# Patient Record
Sex: FEMALE | Race: WHITE
[De-identification: names, ages, dates, MRNs, and addresses within clinical notes are randomized per-mention and may not be internally consistent; named-entity substitution may affect disease eponyms.]

---

## 2021-12-02 ENCOUNTER — TRANSCRIPTION ENCOUNTER (OUTPATIENT)
Age: 71
End: 2021-12-02

## 2021-12-03 PROBLEM — Z00.00 ENCOUNTER FOR PREVENTIVE HEALTH EXAMINATION: Status: ACTIVE | Noted: 2021-12-03

## 2021-12-06 ENCOUNTER — APPOINTMENT (OUTPATIENT)
Dept: DISASTER EMERGENCY | Facility: HOSPITAL | Age: 71
End: 2021-12-06

## 2022-03-12 ENCOUNTER — TRANSCRIPTION ENCOUNTER (OUTPATIENT)
Age: 72
End: 2022-03-12

## 2023-03-08 RX ORDER — CEFUROXIME AXETIL 250 MG
1 TABLET ORAL
Qty: 0 | Refills: 0 | DISCHARGE
Start: 2023-03-08 | End: 2023-03-17

## 2023-03-11 ENCOUNTER — EMERGENCY (EMERGENCY)
Facility: HOSPITAL | Age: 73
LOS: 0 days | Discharge: HOME | End: 2023-03-11
Attending: EMERGENCY MEDICINE
Payer: MEDICARE

## 2023-03-11 VITALS
RESPIRATION RATE: 19 BRPM | DIASTOLIC BLOOD PRESSURE: 63 MMHG | SYSTOLIC BLOOD PRESSURE: 124 MMHG | WEIGHT: 115.08 LBS | HEIGHT: 60 IN | OXYGEN SATURATION: 94 % | TEMPERATURE: 98 F | HEART RATE: 91 BPM

## 2023-03-11 DIAGNOSIS — Z79.82 LONG TERM (CURRENT) USE OF ASPIRIN: ICD-10-CM
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
## 2023-03-11 DIAGNOSIS — S30.0XXA CONTUSION OF LOWER BACK AND PELVIS, INITIAL ENCOUNTER: ICD-10-CM

## 2023-03-11 DIAGNOSIS — Z85.830 PERSONAL HISTORY OF MALIGNANT NEOPLASM OF BONE: ICD-10-CM

## 2023-03-11 DIAGNOSIS — D64.9 ANEMIA, UNSPECIFIED: ICD-10-CM
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
## 2023-03-11 DIAGNOSIS — M54.50 LOW BACK PAIN, UNSPECIFIED: ICD-10-CM

## 2023-03-11 PROCEDURE — 72125 CT NECK SPINE W/O DYE: CPT | Mod: 26,MB

## 2023-03-11 PROCEDURE — 70450 CT HEAD/BRAIN W/O DYE: CPT | Mod: MB

## 2023-03-11 PROCEDURE — 72131 CT LUMBAR SPINE W/O DYE: CPT | Mod: MB

## 2023-03-11 PROCEDURE — 70450 CT HEAD/BRAIN W/O DYE: CPT | Mod: 26,MB

## 2023-03-11 PROCEDURE — 72125 CT NECK SPINE W/O DYE: CPT | Mod: MB

## 2023-03-11 PROCEDURE — 99284 EMERGENCY DEPT VISIT MOD MDM: CPT | Mod: 25

## 2023-03-11 PROCEDURE — 99284 EMERGENCY DEPT VISIT MOD MDM: CPT

## 2023-03-11 PROCEDURE — 72131 CT LUMBAR SPINE W/O DYE: CPT | Mod: 26,MB

## 2023-03-11 RX ORDER — ACETAMINOPHEN 500 MG
650 TABLET ORAL ONCE
Refills: 0 | Status: COMPLETED | OUTPATIENT
Start: 2023-03-11 | End: 2023-03-11

## 2023-03-11 RX ADMIN — Medication 650 MILLIGRAM(S): at 18:01

## 2023-03-11 RX ADMIN — Medication 650 MILLIGRAM(S): at 18:34

## 2023-03-11 RX ADMIN — Medication 30 MILLILITER(S): at 18:52

## 2023-03-11 NOTE — ED PROVIDER NOTE - PATIENT PORTAL LINK FT
You can access the FollowMyHealth Patient Portal offered by [facility] by registering at the following website: [email or web address]. By joining [employer]’s FollowMyHealth portal, you will also be able to view your health information using other applications (apps) compatible with our system.

## 2023-03-11 NOTE — ED ADULT TRIAGE NOTE - CHIEF COMPLAINT QUOTE
Pt fell at home outside from standing level. Pt states when she fell she landed on her buttocks then hit the back of her head. Pt lives with  and wear 02 2 L at home. Pt states she is having back pain and has a bump to the back of her head. Takes baby aspirin at home. Dr. Gonzalez notified. Neuro alert called and Pt brought to CT scan.

## 2023-03-11 NOTE — ED PROVIDER NOTE - NSFOLLOWUPINSTRUCTIONS_ED_ALL_ED_FT
Contusion head and back    Follow with your PMD asap regarding CT results as discussed    Return for any new or worsening symptoms    Tylenol or similar for pain

## 2023-03-11 NOTE — ED ADULT TRIAGE NOTE - MODE OF ARRIVAL
Health Maintenance Summary     Topic Due On Due Status Completed On Postpone Until Reason    IMMUNIZATION - HPV   Aged Out       IMMUNIZATION - DTaP/Tdap/Td Sep 22, 2012 Postponed  May 19, 2017 Patient Refused    Immunization-Influenza  Completed Nov 18, 2016            Patient is due for topics as listed above, he wishes to decline at this time .       Ambulance EMS

## 2023-03-11 NOTE — ED PROVIDER NOTE - CLINICAL SUMMARY MEDICAL DECISION MAKING FREE TEXT BOX
Pt was offered further workup and blood test but only wants scans for now.   CT spine and lower back. CT head and neck.

## 2023-03-11 NOTE — ED PROVIDER NOTE - OBJECTIVE STATEMENT
72 y/o female with h/o myelofibrosis presents to the ED s/p mechanical fall outside home from standing height. Pt was recently admitted to [facility] for 12-13 days for PNA. Pt then came home for a couple of days and developed anemia. Today pt went to get a blood transfusion. After blood transfusion pt came home and lost balance after stepping up one stair. Pt fell onto her butt and then hit her head after. No LOC. Pt currently c/o lower back pain with movement. Denies CP or SOB. Pt takes baby ASA. Non smoker, no EtOH, no drugs. NKDA. Last given 2 tablets of Tylenol at 2 PM today.

## 2023-03-11 NOTE — ED PROVIDER NOTE - MUSCULOSKELETAL, MLM
Pain to lower back, not elicited with palpation but presents with movement. Extremities intact, no evidence of trauma. Neurovascularly intact.

## 2023-03-11 NOTE — ED PROVIDER NOTE - PROGRESS NOTE DETAILS
CT results discussed w pt and family, no acute fx from fall. mass, spleen enlarged, renal findings etc discussed, they say it is known from before but promise to run it by her Physicians as reevaluation. Want dc, feel better, no further tests wanted at this point.

## 2023-03-11 NOTE — ED ADULT NURSE NOTE - OBJECTIVE STATEMENT
BIBA s/p mechanical fall from standing height. Pt recently in hospital for pneumonia, dc'd w/ 2L nc O2 and then went back to hospital today for 2 Units PRBC transfusion d/t chronic anemia. Pt states she fell d/t being weak, -LOC, +head strike. Pt is aa&ox4, vss, does not want repeat blood work at this time, RN will continue to monitor, pending head CT. Bruising noted to BUE d/t recent blood draws, bump to posterior head palpated. Pt c/o pain to posterior head, lower back and buttocks.

## 2023-03-14 ENCOUNTER — INPATIENT (INPATIENT)
Facility: HOSPITAL | Age: 73
LOS: 4 days | Discharge: HOME HEALTH | DRG: 551 | End: 2023-03-19
Attending: HOSPITALIST | Admitting: STUDENT IN AN ORGANIZED HEALTH CARE EDUCATION/TRAINING PROGRAM
Payer: MEDICARE

## 2023-03-14 VITALS — WEIGHT: 113.98 LBS | HEIGHT: 60 IN

## 2023-03-14 DIAGNOSIS — E87.5 HYPERKALEMIA: ICD-10-CM

## 2023-03-14 LAB
ADD ON TEST-SPECIMEN IN LAB: SIGNIFICANT CHANGE UP
ALBUMIN SERPL ELPH-MCNC: 2.8 G/DL — LOW (ref 3.3–5)
ALP SERPL-CCNC: 147 U/L — HIGH (ref 40–120)
ALT FLD-CCNC: 19 U/L — SIGNIFICANT CHANGE UP (ref 12–78)
ANION GAP SERPL CALC-SCNC: 6 MMOL/L — SIGNIFICANT CHANGE UP (ref 5–17)
ANISOCYTOSIS BLD QL: SIGNIFICANT CHANGE UP
APPEARANCE UR: CLEAR — SIGNIFICANT CHANGE UP
AST SERPL-CCNC: 20 U/L — SIGNIFICANT CHANGE UP (ref 15–37)
BACTERIA # UR AUTO: ABNORMAL
BASO STIPL BLD QL SMEAR: PRESENT — SIGNIFICANT CHANGE UP
BASOPHILS # BLD AUTO: 0.67 K/UL — HIGH (ref 0–0.2)
BASOPHILS NFR BLD AUTO: 1 % — SIGNIFICANT CHANGE UP (ref 0–2)
BILIRUB SERPL-MCNC: 0.4 MG/DL — SIGNIFICANT CHANGE UP (ref 0.2–1.2)
BILIRUB UR-MCNC: NEGATIVE — SIGNIFICANT CHANGE UP
BLASTS # FLD: 4 % — HIGH (ref 0–0)
BUN SERPL-MCNC: 44 MG/DL — HIGH (ref 7–23)
CALCIUM SERPL-MCNC: 8.3 MG/DL — LOW (ref 8.5–10.1)
CHLORIDE SERPL-SCNC: 93 MMOL/L — LOW (ref 96–108)
CO2 SERPL-SCNC: 31 MMOL/L — SIGNIFICANT CHANGE UP (ref 22–31)
COLOR SPEC: YELLOW — SIGNIFICANT CHANGE UP
CREAT SERPL-MCNC: 1.17 MG/DL — SIGNIFICANT CHANGE UP (ref 0.5–1.3)
DIFF PNL FLD: ABNORMAL
EGFR: 50 ML/MIN/1.73M2 — LOW
ELLIPTOCYTES BLD QL SMEAR: SLIGHT — SIGNIFICANT CHANGE UP
EOSINOPHIL # BLD AUTO: 0.67 K/UL — HIGH (ref 0–0.5)
EOSINOPHIL NFR BLD AUTO: 1 % — SIGNIFICANT CHANGE UP (ref 0–6)
FLUAV AG NPH QL: SIGNIFICANT CHANGE UP
FLUBV AG NPH QL: SIGNIFICANT CHANGE UP
GLUCOSE SERPL-MCNC: 120 MG/DL — HIGH (ref 70–99)
GLUCOSE UR QL: NEGATIVE — SIGNIFICANT CHANGE UP
HCT VFR BLD CALC: 26.8 % — LOW (ref 34.5–45)
HGB BLD-MCNC: 8.5 G/DL — LOW (ref 11.5–15.5)
HYPOCHROMIA BLD QL: SLIGHT — SIGNIFICANT CHANGE UP
KETONES UR-MCNC: NEGATIVE — SIGNIFICANT CHANGE UP
LEUKOCYTE ESTERASE UR-ACNC: NEGATIVE — SIGNIFICANT CHANGE UP
LG PLATELETS BLD QL AUTO: SIGNIFICANT CHANGE UP
LIDOCAIN IGE QN: 247 U/L — SIGNIFICANT CHANGE UP (ref 73–393)
LYMPHOCYTES # BLD AUTO: 5.37 K/UL — HIGH (ref 1–3.3)
LYMPHOCYTES # BLD AUTO: 8 % — LOW (ref 13–44)
MANUAL SMEAR VERIFICATION: SIGNIFICANT CHANGE UP
MCHC RBC-ENTMCNC: 29 PG — SIGNIFICANT CHANGE UP (ref 27–34)
MCHC RBC-ENTMCNC: 31.7 GM/DL — LOW (ref 32–36)
MCV RBC AUTO: 91.5 FL — SIGNIFICANT CHANGE UP (ref 80–100)
METAMYELOCYTES # FLD: 5 % — HIGH (ref 0–0)
MONOCYTES # BLD AUTO: 1.34 K/UL — HIGH (ref 0–0.9)
MONOCYTES NFR BLD AUTO: 2 % — SIGNIFICANT CHANGE UP (ref 2–14)
MYELOCYTES NFR BLD: 5 % — HIGH (ref 0–0)
NEUTROPHILS # BLD AUTO: 46.29 K/UL — HIGH (ref 1.8–7.4)
NEUTROPHILS NFR BLD AUTO: 59 % — SIGNIFICANT CHANGE UP (ref 43–77)
NEUTS BAND # BLD: 10 % — HIGH (ref 0–8)
NITRITE UR-MCNC: NEGATIVE — SIGNIFICANT CHANGE UP
NRBC # BLD: 0 /100 — SIGNIFICANT CHANGE UP (ref 0–0)
NRBC # BLD: SIGNIFICANT CHANGE UP /100 WBCS (ref 0–0)
PH UR: 5 — SIGNIFICANT CHANGE UP (ref 5–8)
PLAT MORPH BLD: ABNORMAL
PLATELET # BLD AUTO: 1304 K/UL — CRITICAL HIGH (ref 150–400)
POLYCHROMASIA BLD QL SMEAR: SLIGHT — SIGNIFICANT CHANGE UP
POTASSIUM SERPL-MCNC: 5.9 MMOL/L — HIGH (ref 3.5–5.3)
POTASSIUM SERPL-SCNC: 5.9 MMOL/L — HIGH (ref 3.5–5.3)
PROMYELOCYTES # FLD: 5 % — HIGH (ref 0–0)
PROT SERPL-MCNC: 7 GM/DL — SIGNIFICANT CHANGE UP (ref 6–8.3)
PROT UR-MCNC: 30 MG/DL
RBC # BLD: 2.93 M/UL — LOW (ref 3.8–5.2)
RBC # FLD: 21.3 % — HIGH (ref 10.3–14.5)
RBC BLD AUTO: ABNORMAL
RBC CASTS # UR COMP ASSIST: SIGNIFICANT CHANGE UP /HPF (ref 0–4)
RSV RNA NPH QL NAA+NON-PROBE: SIGNIFICANT CHANGE UP
SARS-COV-2 RNA SPEC QL NAA+PROBE: SIGNIFICANT CHANGE UP
SODIUM SERPL-SCNC: 130 MMOL/L — LOW (ref 135–145)
SP GR SPEC: 1.01 — SIGNIFICANT CHANGE UP (ref 1.01–1.02)
STOMATOCYTES BLD QL SMEAR: SIGNIFICANT CHANGE UP
TOXIC GRANULES BLD QL SMEAR: PRESENT — SIGNIFICANT CHANGE UP
UROBILINOGEN FLD QL: NEGATIVE — SIGNIFICANT CHANGE UP
WBC # BLD: 67.09 K/UL — CRITICAL HIGH (ref 3.8–10.5)
WBC # FLD AUTO: 67.09 K/UL — CRITICAL HIGH (ref 3.8–10.5)
WBC UR QL: ABNORMAL /HPF (ref 0–5)

## 2023-03-14 PROCEDURE — 93010 ELECTROCARDIOGRAM REPORT: CPT

## 2023-03-14 PROCEDURE — 85025 COMPLETE CBC W/AUTO DIFF WBC: CPT

## 2023-03-14 PROCEDURE — 85610 PROTHROMBIN TIME: CPT

## 2023-03-14 PROCEDURE — 71260 CT THORAX DX C+: CPT | Mod: 26,MA

## 2023-03-14 PROCEDURE — 85027 COMPLETE CBC AUTOMATED: CPT

## 2023-03-14 PROCEDURE — 82728 ASSAY OF FERRITIN: CPT

## 2023-03-14 PROCEDURE — 86850 RBC ANTIBODY SCREEN: CPT

## 2023-03-14 PROCEDURE — 99223 1ST HOSP IP/OBS HIGH 75: CPT

## 2023-03-14 PROCEDURE — 86920 COMPATIBILITY TEST SPIN: CPT

## 2023-03-14 PROCEDURE — 86900 BLOOD TYPING SEROLOGIC ABO: CPT

## 2023-03-14 PROCEDURE — 36415 COLL VENOUS BLD VENIPUNCTURE: CPT

## 2023-03-14 PROCEDURE — 99285 EMERGENCY DEPT VISIT HI MDM: CPT

## 2023-03-14 PROCEDURE — G1004: CPT

## 2023-03-14 PROCEDURE — 97116 GAIT TRAINING THERAPY: CPT | Mod: GP

## 2023-03-14 PROCEDURE — 74177 CT ABD & PELVIS W/CONTRAST: CPT | Mod: 26,ME

## 2023-03-14 PROCEDURE — 97162 PT EVAL MOD COMPLEX 30 MIN: CPT | Mod: GP

## 2023-03-14 PROCEDURE — 86870 RBC ANTIBODY IDENTIFICATION: CPT

## 2023-03-14 PROCEDURE — 86902 BLOOD TYPE ANTIGEN DONOR EA: CPT

## 2023-03-14 PROCEDURE — 36430 TRANSFUSION BLD/BLD COMPNT: CPT

## 2023-03-14 PROCEDURE — 80053 COMPREHEN METABOLIC PANEL: CPT

## 2023-03-14 PROCEDURE — 83735 ASSAY OF MAGNESIUM: CPT

## 2023-03-14 PROCEDURE — 83605 ASSAY OF LACTIC ACID: CPT

## 2023-03-14 PROCEDURE — 86921 COMPATIBILITY TEST INCUBATE: CPT

## 2023-03-14 PROCEDURE — 86880 COOMBS TEST DIRECT: CPT

## 2023-03-14 PROCEDURE — P9016: CPT

## 2023-03-14 PROCEDURE — 83036 HEMOGLOBIN GLYCOSYLATED A1C: CPT

## 2023-03-14 PROCEDURE — 86803 HEPATITIS C AB TEST: CPT

## 2023-03-14 PROCEDURE — 86922 COMPATIBILITY TEST ANTIGLOB: CPT

## 2023-03-14 PROCEDURE — 85730 THROMBOPLASTIN TIME PARTIAL: CPT

## 2023-03-14 PROCEDURE — 93306 TTE W/DOPPLER COMPLETE: CPT

## 2023-03-14 PROCEDURE — 86901 BLOOD TYPING SEROLOGIC RH(D): CPT

## 2023-03-14 PROCEDURE — 85652 RBC SED RATE AUTOMATED: CPT

## 2023-03-14 PROCEDURE — 97530 THERAPEUTIC ACTIVITIES: CPT | Mod: GP

## 2023-03-14 PROCEDURE — 80048 BASIC METABOLIC PNL TOTAL CA: CPT

## 2023-03-14 PROCEDURE — 86860 RBC ANTIBODY ELUTION: CPT

## 2023-03-14 PROCEDURE — 83930 ASSAY OF BLOOD OSMOLALITY: CPT

## 2023-03-14 PROCEDURE — 84443 ASSAY THYROID STIM HORMONE: CPT

## 2023-03-14 RX ORDER — SODIUM CHLORIDE 9 MG/ML
1000 INJECTION INTRAMUSCULAR; INTRAVENOUS; SUBCUTANEOUS ONCE
Refills: 0 | Status: COMPLETED | OUTPATIENT
Start: 2023-03-14 | End: 2023-03-14

## 2023-03-14 RX ORDER — MORPHINE SULFATE 50 MG/1
4 CAPSULE, EXTENDED RELEASE ORAL ONCE
Refills: 0 | Status: DISCONTINUED | OUTPATIENT
Start: 2023-03-14 | End: 2023-03-14

## 2023-03-14 RX ORDER — CEFTRIAXONE 500 MG/1
1000 INJECTION, POWDER, FOR SOLUTION INTRAMUSCULAR; INTRAVENOUS ONCE
Refills: 0 | Status: COMPLETED | OUTPATIENT
Start: 2023-03-14 | End: 2023-03-14

## 2023-03-14 RX ORDER — SODIUM ZIRCONIUM CYCLOSILICATE 10 G/10G
10 POWDER, FOR SUSPENSION ORAL ONCE
Refills: 0 | Status: COMPLETED | OUTPATIENT
Start: 2023-03-14 | End: 2023-03-14

## 2023-03-14 RX ORDER — ACETAMINOPHEN 500 MG
650 TABLET ORAL ONCE
Refills: 0 | Status: COMPLETED | OUTPATIENT
Start: 2023-03-14 | End: 2023-03-14

## 2023-03-14 RX ADMIN — SODIUM CHLORIDE 2000 MILLILITER(S): 9 INJECTION INTRAMUSCULAR; INTRAVENOUS; SUBCUTANEOUS at 21:01

## 2023-03-14 RX ADMIN — SODIUM CHLORIDE 1000 MILLILITER(S): 9 INJECTION INTRAMUSCULAR; INTRAVENOUS; SUBCUTANEOUS at 22:20

## 2023-03-14 RX ADMIN — MORPHINE SULFATE 4 MILLIGRAM(S): 50 CAPSULE, EXTENDED RELEASE ORAL at 21:35

## 2023-03-14 RX ADMIN — Medication 650 MILLIGRAM(S): at 21:31

## 2023-03-14 RX ADMIN — SODIUM CHLORIDE 1000 MILLILITER(S): 9 INJECTION INTRAMUSCULAR; INTRAVENOUS; SUBCUTANEOUS at 21:53

## 2023-03-14 RX ADMIN — SODIUM ZIRCONIUM CYCLOSILICATE 10 GRAM(S): 10 POWDER, FOR SUSPENSION ORAL at 21:57

## 2023-03-14 RX ADMIN — MORPHINE SULFATE 4 MILLIGRAM(S): 50 CAPSULE, EXTENDED RELEASE ORAL at 21:05

## 2023-03-14 RX ADMIN — Medication 650 MILLIGRAM(S): at 21:01

## 2023-03-14 RX ADMIN — MORPHINE SULFATE 4 MILLIGRAM(S): 50 CAPSULE, EXTENDED RELEASE ORAL at 22:53

## 2023-03-14 NOTE — ED STATDOCS - PROGRESS NOTE DETAILS
Corine Rapp for attending Dr. Magana: 70 yo female with a PMHx of myelofibrosis presents ambulatory to ED c/o severe R flank pain x 1 days. Patient fell on Saturday was brought to .  Patient took Tylenol prior to arrival with no relief. Pt is on baby ASA. Pt is on O2 for recent diagnosis for PNA 10-12 days, was in [facility], received recent blood transfusion for anemia. Will send to [facility] for further evaluation.

## 2023-03-14 NOTE — ED ADULT NURSE NOTE - NSIMPLEMENTINTERV_GEN_ALL_ED
Implemented All Fall with Harm Risk Interventions:  [place] to call system. Call bell, personal items and telephone within reach. Instruct patient to call for assistance. Room bathroom lighting operational. Non-slip footwear when patient is off stretcher. Physically safe environment: no spills, clutter or unnecessary equipment. Stretcher in lowest position, wheels locked, appropriate side rails in place. Provide visual cue, wrist band, yellow gown, etc. Monitor gait and stability. Monitor for mental status changes and reorient to person, place, and time. Review medications for side effects contributing to fall risk. Reinforce activity limits and safety measures with patient and family. Provide visual clues: red socks.

## 2023-03-14 NOTE — ED PROVIDER NOTE - PHYSICAL EXAMINATION
General: AAOx3, NAD  HEENT: NCAT  Cardiac: Normal rate and rhythm, no murmurs, normal peripheral perfusion  Respiratory: Normal rate and effort. CTAB  GI: Soft, nondistended, nontender  Neuro: No focal deficits. COPPOLA equally x4, sensation to light touch intact throughout  MSK: FROMx4, no peripheral edema. +moderate R posterior chest wall, R flank, R CVA TTP.   Skin: No rash

## 2023-03-14 NOTE — ED PROVIDER NOTE - CLINICAL SUMMARY MEDICAL DECISION MAKING FREE TEXT BOX
Pt with recent fall 3 days ago worsening R flank and R posterior chest wall pain. Recent hospitalization for PNA with symptoms improving. Pain not controlled at home with Tylenol. Plan for Morphine for pain control, CT chest/abdomen/pelvis to r/o rib fracture and lung/kidney/liver injury, labs, urine, reassess. Pt with recent fall 3 days ago worsening R flank and R posterior chest wall pain. Recent hospitalization for PNA with symptoms improving. Pain not controlled at home with Tylenol. Plan for Morphine for pain control, CT chest/abdomen/pelvis to r/o rib fracture and lung/kidney/liver injury, labs, urine, reassess.   in comparison to recent outpatient labs that we reviewed on her portal, white count has increased from 40s to 60s, hemoglobin improved from 6.6 to above 8, platelets improved from 14 100-13 100.  Bandemia remains similar.  These are all baseline per patient and family  CT today significant for acute mild L3 superior endplate compression fracture, as well as right-sided kidney stone with mild right hydronephrosis.  Creatinine remains normal  Patient continues to have recurrent pain in the right flank requiring morphine.  She also remains in atrial fibrillation with heart rate around 110–115.  CT showing evidence of groundglass opacities and pneumonia, this is likely related to her recent hospitalization for pneumonia for which she is currently taking cefuroxime and otherwise feeling better  Patient given IV fluids as well as Lokelma for hyperkalemia, will continue to monitor on telemetry monitoring, no indication for rate control or antiarrhythmic for atrial fibrillation at this time as she remains about average 110 bpm.  Given the persistent pain, electrolyte abnormalities, as well as renal colic and back fracture, patient will require admission for further work-up and management.

## 2023-03-14 NOTE — ED ADULT TRIAGE NOTE - CHIEF COMPLAINT QUOTE
Ambulatory to ER with c/o R flank pain x 1 days. Patient took Tyleol prior to arrival with no relief.

## 2023-03-14 NOTE — ED PROVIDER NOTE - NS ED ROS FT
Constitutional: No fevers, chills, or sweats.  Cardiac: No chest pain, exertional dyspnea, orthopnea  Respiratory: No shortness of breath, no cough  GI: No N/V/D. +flank pain  Neuro: No headaches, no neck pain/stiffness, no numbness  All other systems reviewed and are negative unless otherwise stated in the HPI. Constitutional: No fevers, chills, or sweats.  Cardiac: No chest pain, exertional dyspnea, orthopnea  Respiratory: No shortness of breath, no cough  GI: No N/V/D. +R flank/R posterior chest wall pain  Neuro: No headaches, no neck pain/stiffness, no numbness  All other systems reviewed and are negative unless otherwise stated in the HPI.

## 2023-03-14 NOTE — ED ADULT NURSE NOTE - SUICIDE SCREENING DEPRESSION
Negative I will START or STAY ON the medications listed below when I get home from the hospital:    acetaminophen 325 mg oral tablet  -- 2 tab(s) by mouth every 6 hours, As needed, Mild Pain (1 - 3)  -- Indication: For for mild pain    oxyCODONE 5 mg oral tablet  -- 1 tab(s) by mouth every 4 hours, As needed, Severe pain MDD:6 tabs  -- Indication: For for severe pain    diazePAM 2 mg oral tablet  -- 1 tab(s) by mouth every 12 hours for muscle spasm MDD:2 tabs  -- Indication: For for muscle spasm    polyethylene glycol 3350 oral powder for reconstitution  -- 17 gram(s) by mouth once a day (at bedtime), As Needed for constipation  -- Indication: For for constipation    senna oral tablet  -- 2 tab(s) by mouth once a day (at bedtime) as needed for constipation   -- Indication: For for constipation I will START or STAY ON the medications listed below when I get home from the hospital:    acetaminophen 325 mg oral tablet  -- 2 tab(s) by mouth every 6 hours, As needed, Mild Pain (1 - 3)  -- Indication: For for mild pain    oxyCODONE 5 mg oral tablet  -- 1 tab(s) by mouth every 4 hours, As needed, Severe pain MDD:6 tabs  -- Indication: For Pain following surgery or procedure    diazePAM 2 mg oral tablet  -- 1 tab(s) by mouth every 12 hours for muscle spasm MDD:2 tabs  -- Indication: For Pain following surgery or procedure    senna oral tablet  -- 2 tab(s) by mouth once a day (at bedtime) as needed for constipation  -- Indication: For constipation    polyethylene glycol 3350 oral powder for reconstitution  -- 17 gram(s) by mouth once a day (at bedtime), As Needed for constipation  -- Indication: For constipation I will START or STAY ON the medications listed below when I get home from the hospital:    acetaminophen 325 mg oral tablet  -- 2 tab(s) by mouth every 6 hours, As needed, Mild Pain (1 - 3)  -- Indication: For for mild pain    oxyCODONE 5 mg oral tablet  -- 1 tab(s) by mouth every 4 hours, As needed, Severe pain MDD:6 tabs  -- Indication: For Pain following surgery or procedure    diazePAM 2 mg oral tablet  -- 1 tab(s) by mouth every 12 hours for muscle spasm MDD:2 tabs  -- Indication: For Pain following surgery or procedure    polyethylene glycol 3350 oral powder for reconstitution  -- 17 gram(s) by mouth once a day (at bedtime), As Needed for constipation  -- Indication: For Constipation    senna oral tablet  -- 2 tab(s) by mouth once a day (at bedtime) as needed for constipation  -- Indication: For Constipation

## 2023-03-14 NOTE — ED PROVIDER NOTE - OBJECTIVE STATEMENT
Anatomy scan next visit. Orders placed.  Pt c/o increased fatigue. States she has no energy or motivation to do anything.   Declines Flu vaccine.    70 yo female with a PMHx of myelofibrosis, PNA, anemia presents ambulatory to ED c/o severe R flank pain x 1 day. Patient fell on Saturday was brought to .  Patient took Tylenol prior to arrival with no relief. Pt is on baby ASA. Pt is on O2 for recent diagnosis for PNA 10-12 days, was in [facility], received recent blood transfusion for anemia. Pt reports her flank pain feels similar to when she was admitted for PNA recently. 70 yo female with a PMHx of myelofibrosis, PNA, anemia, on baby ASA presents ambulatory to ED c/o worsening severe R flank/posteior chest wall pain x 1 day. Patient fell on her buttocks on Saturday was brought to .  Patient took Tylenol prior to arrival with no relief. Pt is on baby ASA. Pt is on O2 for recent diagnosis for PNA 10-12 days, was in [facility], received recent blood transfusion for anemia. Pt reports her flank pain feels similar to when she was admitted for PNA recently. Pt cannot have Ibuprofen due to her myelofibrosis.

## 2023-03-14 NOTE — ED PROVIDER NOTE - CARE PLAN
1 Principal Discharge DX:	Hyperkalemia  Secondary Diagnosis:	Atrial fibrillation with RVR  Secondary Diagnosis:	Hydronephrosis, right  Secondary Diagnosis:	Colic, ureteral

## 2023-03-14 NOTE — ED ADULT NURSE NOTE - OBJECTIVE STATEMENT
Received 73 y/o with c/o of right flank/posterior chest wall and right buttock pain, pt had a fall on Saturday, was seen here in , had scans done was discharged home. Pt returned with some difficulty breathing, on O2 via NC at 2LPM, recently hospitalized at [facility] for PNA, pt has been taking tylenol q4-5hr for pain, provider at bedside for eval.

## 2023-03-15 DIAGNOSIS — I31.39 OTHER PERICARDIAL EFFUSION (NONINFLAMMATORY): ICD-10-CM

## 2023-03-15 DIAGNOSIS — I48.91 UNSPECIFIED ATRIAL FIBRILLATION: ICD-10-CM

## 2023-03-15 LAB
ADD ON TEST-SPECIMEN IN LAB: SIGNIFICANT CHANGE UP
ALBUMIN SERPL ELPH-MCNC: 2.8 G/DL — LOW (ref 3.3–5)
ALP SERPL-CCNC: 154 U/L — HIGH (ref 40–120)
ALT FLD-CCNC: 17 U/L — SIGNIFICANT CHANGE UP (ref 12–78)
ANION GAP SERPL CALC-SCNC: 5 MMOL/L — SIGNIFICANT CHANGE UP (ref 5–17)
APTT BLD: 36.1 SEC — HIGH (ref 27.5–35.5)
AST SERPL-CCNC: 19 U/L — SIGNIFICANT CHANGE UP (ref 15–37)
BILIRUB SERPL-MCNC: 0.3 MG/DL — SIGNIFICANT CHANGE UP (ref 0.2–1.2)
BUN SERPL-MCNC: 34 MG/DL — HIGH (ref 7–23)
CALCIUM SERPL-MCNC: 8.1 MG/DL — LOW (ref 8.5–10.1)
CHLORIDE SERPL-SCNC: 92 MMOL/L — LOW (ref 96–108)
CO2 SERPL-SCNC: 29 MMOL/L — SIGNIFICANT CHANGE UP (ref 22–31)
CREAT SERPL-MCNC: 1.03 MG/DL — SIGNIFICANT CHANGE UP (ref 0.5–1.3)
EGFR: 58 ML/MIN/1.73M2 — LOW
ERYTHROCYTE [SEDIMENTATION RATE] IN BLOOD: 88 MM/HR — HIGH (ref 0–20)
FERRITIN SERPL-MCNC: 1711 NG/ML — HIGH (ref 15–150)
GLUCOSE SERPL-MCNC: 123 MG/DL — HIGH (ref 70–99)
HCT VFR BLD CALC: 24.9 % — LOW (ref 34.5–45)
HCT VFR BLD CALC: 25.8 % — LOW (ref 34.5–45)
HGB BLD-MCNC: 7.7 G/DL — LOW (ref 11.5–15.5)
HGB BLD-MCNC: 7.9 G/DL — LOW (ref 11.5–15.5)
INR BLD: 1.11 RATIO — SIGNIFICANT CHANGE UP (ref 0.88–1.16)
LACTATE SERPL-SCNC: 0.8 MMOL/L — SIGNIFICANT CHANGE UP (ref 0.7–2)
MCHC RBC-ENTMCNC: 28.1 PG — SIGNIFICANT CHANGE UP (ref 27–34)
MCHC RBC-ENTMCNC: 28.6 PG — SIGNIFICANT CHANGE UP (ref 27–34)
MCHC RBC-ENTMCNC: 30.6 GM/DL — LOW (ref 32–36)
MCHC RBC-ENTMCNC: 30.9 GM/DL — LOW (ref 32–36)
MCV RBC AUTO: 91.8 FL — SIGNIFICANT CHANGE UP (ref 80–100)
MCV RBC AUTO: 92.6 FL — SIGNIFICANT CHANGE UP (ref 80–100)
NT-PROBNP SERPL-SCNC: 4152 PG/ML — HIGH (ref 0–125)
PLATELET # BLD AUTO: 1332 K/UL — CRITICAL HIGH (ref 150–400)
PLATELET # BLD AUTO: 1335 K/UL — CRITICAL HIGH (ref 150–400)
POTASSIUM SERPL-MCNC: 4.8 MMOL/L — SIGNIFICANT CHANGE UP (ref 3.5–5.3)
POTASSIUM SERPL-SCNC: 4.8 MMOL/L — SIGNIFICANT CHANGE UP (ref 3.5–5.3)
PROT SERPL-MCNC: 7 GM/DL — SIGNIFICANT CHANGE UP (ref 6–8.3)
PROTHROM AB SERPL-ACNC: 12.9 SEC — SIGNIFICANT CHANGE UP (ref 10.5–13.4)
RBC # BLD: 2.69 M/UL — LOW (ref 3.8–5.2)
RBC # BLD: 2.81 M/UL — LOW (ref 3.8–5.2)
RBC # FLD: 21.4 % — HIGH (ref 10.3–14.5)
RBC # FLD: 21.5 % — HIGH (ref 10.3–14.5)
SODIUM SERPL-SCNC: 126 MMOL/L — LOW (ref 135–145)
WBC # BLD: 67.22 K/UL — CRITICAL HIGH (ref 3.8–10.5)
WBC # BLD: 68.49 K/UL — CRITICAL HIGH (ref 3.8–10.5)
WBC # FLD AUTO: 67.22 K/UL — CRITICAL HIGH (ref 3.8–10.5)
WBC # FLD AUTO: 68.49 K/UL — CRITICAL HIGH (ref 3.8–10.5)

## 2023-03-15 PROCEDURE — 93306 TTE W/DOPPLER COMPLETE: CPT | Mod: 26

## 2023-03-15 PROCEDURE — 99222 1ST HOSP IP/OBS MODERATE 55: CPT

## 2023-03-15 PROCEDURE — 99223 1ST HOSP IP/OBS HIGH 75: CPT

## 2023-03-15 RX ORDER — ACETAMINOPHEN 500 MG
1000 TABLET ORAL EVERY 8 HOURS
Refills: 0 | Status: DISCONTINUED | OUTPATIENT
Start: 2023-03-15 | End: 2023-03-19

## 2023-03-15 RX ORDER — FUROSEMIDE 40 MG
40 TABLET ORAL DAILY
Refills: 0 | Status: DISCONTINUED | OUTPATIENT
Start: 2023-03-15 | End: 2023-03-16

## 2023-03-15 RX ORDER — PANTOPRAZOLE SODIUM 20 MG/1
40 TABLET, DELAYED RELEASE ORAL
Refills: 0 | Status: DISCONTINUED | OUTPATIENT
Start: 2023-03-15 | End: 2023-03-19

## 2023-03-15 RX ORDER — OXYCODONE HYDROCHLORIDE 5 MG/1
10 TABLET ORAL EVERY 4 HOURS
Refills: 0 | Status: DISCONTINUED | OUTPATIENT
Start: 2023-03-15 | End: 2023-03-19

## 2023-03-15 RX ORDER — HEPARIN SODIUM 5000 [USP'U]/ML
4500 INJECTION INTRAVENOUS; SUBCUTANEOUS ONCE
Refills: 0 | Status: COMPLETED | OUTPATIENT
Start: 2023-03-15 | End: 2023-03-15

## 2023-03-15 RX ORDER — ONDANSETRON 8 MG/1
4 TABLET, FILM COATED ORAL EVERY 8 HOURS
Refills: 0 | Status: DISCONTINUED | OUTPATIENT
Start: 2023-03-15 | End: 2023-03-19

## 2023-03-15 RX ORDER — ASPIRIN/CALCIUM CARB/MAGNESIUM 324 MG
81 TABLET ORAL DAILY
Refills: 0 | Status: DISCONTINUED | OUTPATIENT
Start: 2023-03-15 | End: 2023-03-15

## 2023-03-15 RX ORDER — DILTIAZEM HCL 120 MG
30 CAPSULE, EXT RELEASE 24 HR ORAL EVERY 6 HOURS
Refills: 0 | Status: DISCONTINUED | OUTPATIENT
Start: 2023-03-15 | End: 2023-03-17

## 2023-03-15 RX ORDER — INFLUENZA VIRUS VACCINE 15; 15; 15; 15 UG/.5ML; UG/.5ML; UG/.5ML; UG/.5ML
0.7 SUSPENSION INTRAMUSCULAR ONCE
Refills: 0 | Status: DISCONTINUED | OUTPATIENT
Start: 2023-03-15 | End: 2023-03-19

## 2023-03-15 RX ORDER — THIAMINE MONONITRATE (VIT B1) 100 MG
100 TABLET ORAL DAILY
Refills: 0 | Status: DISCONTINUED | OUTPATIENT
Start: 2023-03-15 | End: 2023-03-19

## 2023-03-15 RX ORDER — MORPHINE SULFATE 50 MG/1
2 CAPSULE, EXTENDED RELEASE ORAL EVERY 4 HOURS
Refills: 0 | Status: DISCONTINUED | OUTPATIENT
Start: 2023-03-15 | End: 2023-03-16

## 2023-03-15 RX ORDER — CEFTRIAXONE 500 MG/1
1000 INJECTION, POWDER, FOR SOLUTION INTRAMUSCULAR; INTRAVENOUS EVERY 24 HOURS
Refills: 0 | Status: COMPLETED | OUTPATIENT
Start: 2023-03-16 | End: 2023-03-18

## 2023-03-15 RX ORDER — LANOLIN ALCOHOL/MO/W.PET/CERES
3 CREAM (GRAM) TOPICAL AT BEDTIME
Refills: 0 | Status: DISCONTINUED | OUTPATIENT
Start: 2023-03-15 | End: 2023-03-19

## 2023-03-15 RX ORDER — SENNA PLUS 8.6 MG/1
2 TABLET ORAL AT BEDTIME
Refills: 0 | Status: DISCONTINUED | OUTPATIENT
Start: 2023-03-15 | End: 2023-03-19

## 2023-03-15 RX ORDER — POLYETHYLENE GLYCOL 3350 17 G/17G
17 POWDER, FOR SOLUTION ORAL DAILY
Refills: 0 | Status: DISCONTINUED | OUTPATIENT
Start: 2023-03-15 | End: 2023-03-19

## 2023-03-15 RX ORDER — ATORVASTATIN CALCIUM 80 MG/1
10 TABLET, FILM COATED ORAL AT BEDTIME
Refills: 0 | Status: DISCONTINUED | OUTPATIENT
Start: 2023-03-15 | End: 2023-03-19

## 2023-03-15 RX ORDER — OXYCODONE HYDROCHLORIDE 5 MG/1
5 TABLET ORAL EVERY 4 HOURS
Refills: 0 | Status: DISCONTINUED | OUTPATIENT
Start: 2023-03-15 | End: 2023-03-19

## 2023-03-15 RX ORDER — HEPARIN SODIUM 5000 [USP'U]/ML
INJECTION INTRAVENOUS; SUBCUTANEOUS
Qty: 25000 | Refills: 0 | Status: DISCONTINUED | OUTPATIENT
Start: 2023-03-15 | End: 2023-03-16

## 2023-03-15 RX ORDER — NALOXONE HYDROCHLORIDE 4 MG/.1ML
0.4 SPRAY NASAL ONCE
Refills: 0 | Status: DISCONTINUED | OUTPATIENT
Start: 2023-03-15 | End: 2023-03-19

## 2023-03-15 RX ORDER — HEPARIN SODIUM 5000 [USP'U]/ML
2000 INJECTION INTRAVENOUS; SUBCUTANEOUS EVERY 6 HOURS
Refills: 0 | Status: DISCONTINUED | OUTPATIENT
Start: 2023-03-15 | End: 2023-03-16

## 2023-03-15 RX ORDER — HEPARIN SODIUM 5000 [USP'U]/ML
4500 INJECTION INTRAVENOUS; SUBCUTANEOUS EVERY 6 HOURS
Refills: 0 | Status: DISCONTINUED | OUTPATIENT
Start: 2023-03-15 | End: 2023-03-16

## 2023-03-15 RX ADMIN — Medication 81 MILLIGRAM(S): at 11:29

## 2023-03-15 RX ADMIN — CEFTRIAXONE 1000 MILLIGRAM(S): 500 INJECTION, POWDER, FOR SOLUTION INTRAMUSCULAR; INTRAVENOUS at 00:30

## 2023-03-15 RX ADMIN — HEPARIN SODIUM 4500 UNIT(S): 5000 INJECTION INTRAVENOUS; SUBCUTANEOUS at 11:00

## 2023-03-15 RX ADMIN — Medication 100 MILLIGRAM(S): at 11:28

## 2023-03-15 RX ADMIN — POLYETHYLENE GLYCOL 3350 17 GRAM(S): 17 POWDER, FOR SOLUTION ORAL at 11:30

## 2023-03-15 RX ADMIN — HEPARIN SODIUM 1300 UNIT(S)/HR: 5000 INJECTION INTRAVENOUS; SUBCUTANEOUS at 18:29

## 2023-03-15 RX ADMIN — SENNA PLUS 2 TABLET(S): 8.6 TABLET ORAL at 21:32

## 2023-03-15 RX ADMIN — Medication 40 MILLIGRAM(S): at 11:26

## 2023-03-15 RX ADMIN — HEPARIN SODIUM 4500 UNIT(S): 5000 INJECTION INTRAVENOUS; SUBCUTANEOUS at 11:28

## 2023-03-15 RX ADMIN — ATORVASTATIN CALCIUM 10 MILLIGRAM(S): 80 TABLET, FILM COATED ORAL at 21:32

## 2023-03-15 RX ADMIN — HEPARIN SODIUM 1300 UNIT(S)/HR: 5000 INJECTION INTRAVENOUS; SUBCUTANEOUS at 19:40

## 2023-03-15 RX ADMIN — Medication 1000 MILLIGRAM(S): at 22:02

## 2023-03-15 RX ADMIN — HEPARIN SODIUM 1100 UNIT(S)/HR: 5000 INJECTION INTRAVENOUS; SUBCUTANEOUS at 11:55

## 2023-03-15 RX ADMIN — Medication 30 MILLIGRAM(S): at 18:34

## 2023-03-15 RX ADMIN — Medication 3 MILLIGRAM(S): at 21:32

## 2023-03-15 RX ADMIN — PANTOPRAZOLE SODIUM 40 MILLIGRAM(S): 20 TABLET, DELAYED RELEASE ORAL at 11:30

## 2023-03-15 RX ADMIN — Medication 30 MILLIGRAM(S): at 11:28

## 2023-03-15 RX ADMIN — HEPARIN SODIUM 4500 UNIT(S): 5000 INJECTION INTRAVENOUS; SUBCUTANEOUS at 19:37

## 2023-03-15 RX ADMIN — Medication 1000 MILLIGRAM(S): at 21:32

## 2023-03-15 NOTE — H&P ADULT - ASSESSMENT
71 yo female with a PMHx of myelofibrosis, PNA, anemia, on baby ASA presents ambulatory to ED c/o worsening severe R flank/posteior chest wall pain x 1 day. Patient fell on her buttocks on Saturday was brought to .  Patient took Tylenol prior to arrival with no relief. Pt is on baby ASA. Pt is on O2 for recent diagnosis for PNA 10-12 days for which she is on cefuroxime and  was in [facility], received recent blood transfusion for anemia recently. Pt reports her flank pain feels similar to when she was admitted for PNA recently. Pt cannot have Ibuprofen due to her myelofibrosis.  Pt with recent fall 3 days ago worsening R flank and R posterior chest wall pain. Recent hospitalization for PNA with symptoms improving. Pain not controlled at home with Tylenol. Plan for Morphine for pain control, CT chest/abdomen/pelvis revealed B/L pleural effusions and mod sized pericardial effusion with cardiomegaly and possible pulm edema. Massive hepatosplenomegaly with 2 wegde shaped infarcts to liver. alos found was mild right ureteral stone with mild right hydro/ An acute L3 compression fracture was identified. small 5mm RU nodule.   ED provider noted:  in comparison to recent outpatient labs that we reviewed on her portal, white count has increased from 40s to 60s, hemoglobin improved from 6.6 to above 8, platelets improved from 14 100-13 100.  Bandemia remains similar.  These are all baseline per patient and family. Creatinine normal    Ed course: found to be in new onset rapid afib HR range 110-120bpm.   EKG: Afib with RVR, with RV hypertrophy. Poor R wave progression. No dynamic stt changes. QTc normal. Tele:  presently. TNI negative x 1.  Serum potassium 5.9 with Na 130. Given lokelma, morphine and 2L normal saline      #New onset atrial fibrillation with RVR  - admit to tele  - add cardizem for better rate control  - 2D echo to assess size of pericardial effusion  - cardiology consult  - CHADS-VASC  suspect a 3 as the CT shows e/o cardiomegaly however will need to assess EF  - Will start anti-coag nonetheless given thrombocytosis and ? hepatic infarct      #Hyperkalemia  - no dynamic EKG changes on ectopy on monitor  - s/p lokelma  - check K in several house with bmp in am      #H/o myelofibrosis  - recd transfusion recently  - hematology consult  - 10% bandemia - per family, this is unchanged from prior lab work  - WBC count was 40s most recently, now in the 60s  - PLT count 1300 -> oncology to determine if pt requires plateletpheresis or any other intervention  - R/o infection  - Check inflammatory markers (ferritin/ESR)  - start full dose A/C  - she denies any secondary symptoms of polycythemia (visual disturbance or HA)      #Right ureteral stone/Mild right hydronephrosis  - Scr : wnl  - making urine  - trace pyuria  - agree with ceftriaxone  - urine cultures  - urology consult  - pain control  - IVF  - anti-emetics      #Fall/L3 compression fracture with retropulsion  - bedrest for now  - ortho-spine consult      #B/L pleural effusion/pulmonary edema r/o chf  - obtain 2d echo  - iv diuresis  - strict I/Os  - daily weights        Full code.   dvt: Hep gtt   73 yo female with a PMHx of myelofibrosis, PNA, anemia, on baby ASA presents ambulatory to ED c/o worsening severe R flank/posteior chest wall pain x 1 day. Patient fell on her buttocks on Saturday was brought to .  Patient took Tylenol prior to arrival with no relief. Pt is on baby ASA. Pt is on O2 for recent diagnosis for PNA 10-12 days for which she is on cefuroxime and  was in [facility], received recent blood transfusion for anemia recently. Pt reports her flank pain feels similar to when she was admitted for PNA recently. Pt cannot have Ibuprofen due to her myelofibrosis.  Pt with recent fall 3 days ago worsening R flank and R posterior chest wall pain. Recent hospitalization for PNA with symptoms improving. Pain not controlled at home with Tylenol. Plan for Morphine for pain control, CT chest/abdomen/pelvis revealed B/L pleural effusions and mod sized pericardial effusion with cardiomegaly and possible pulm edema. Massive hepatosplenomegaly with 2 wegde shaped infarcts to liver. alos found was mild right ureteral stone with mild right hydro/ An acute L3 compression fracture was identified. small 5mm RU nodule.   ED provider noted:  in comparison to recent outpatient labs that we reviewed on her portal, white count has increased from 40s to 60s, hemoglobin improved from 6.6 to above 8, platelets improved from 14 100-13 100.  Bandemia remains similar.  These are all baseline per patient and family. Creatinine normal    Ed course: found to be in new onset rapid afib HR range 110-120bpm.   EKG: Afib with RVR, with RV hypertrophy. Poor R wave progression. No dynamic stt changes. QTc normal. Tele:  presently. TNI negative x 1.  Serum potassium 5.9 with Na 130. Given lokelma, morphine and 2L normal saline      #New onset atrial fibrillation with RVR  - admit to tele  - add cardizem for better rate control  - 2D echo to assess size of pericardial effusion  - cardiology consult  - CHADS-VASC  suspect a 3 as the CT shows e/o cardiomegaly however will need to assess EF  - Will start anti-coag nonetheless given thrombocytosis and Splenic infarct -> consider starting eliquis or xarelto once h/h remains stable. (she recently had transfusion this weekend)      #Hyperkalemia  - no dynamic EKG changes on ectopy on monitor  - s/p lokelma  - check K in several house with bmp in am      #H/o myelofibrosis  - recd transfusion recently for chronic anemia  - 10% bandemia - per family, this is unchanged from prior lab work  - WBC count was 40s most recently, now in the 60s  - PLT count 1.3 mil-> d/w Dr Solorzano, agrees with a/c. No other treatments planned at this juncture for her myelofibrosis. Cont heparin today. Recc holding ASA for now  - Check inflammatory markers (ferritin/ESR)  - she denies any secondary symptoms of polycythemia (visual disturbance or HA)      #Right ureteral stone/Mild right hydronephrosis  - Scr : wnl  - making urine  - trace pyuria  - agree with ceftriaxone  - urine cultures  - urology consult  - pain control  - IVF  - anti-emetics      #Fall/L3 compression fracture with retropulsion  - bedrest for now  - ortho-spine consult      #B/L pleural effusion/pulmonary edema r/o chf/Hyponatremia  - obtain 2d echo  - iv diuresis  - strict I/Os  - daily weights  - tsh, urine and serum Na levels        Full code.   dvt: Hep gtt  D/W cardiology, oncology  Plan: await echo, cont to diurese, heparin for PAF, rate control, urology and spine follow up. Pain control

## 2023-03-15 NOTE — CONSULT NOTE ADULT - ASSESSMENT
Assessment/Plan:   72y Female with Acute L3 vertebral compression fracture    -Pain control PRN - recommend acetaminophen, muscle relaxant, and low dose opioids as needed  -PT  -OOB, Ambulate as tolerated  -No heavy lifting/twisting  -Recommend follow up w/ Dr. Batista in 2 weeks. Please call office to schedule appointment. Will obtain follow up xrays at that time  -Recommend Ca & Vit D supplementation  -Medical management per primary team  -All patient's questions answered. Patient understands and agrees w/ above plan.  -Discussed w/ Dr. Batista who is aware and agrees w/ above plan.     Assessment/Plan:   72y Female with Acute L3 vertebral compression fracture    -Pain control PRN - recommend acetaminophen, muscle relaxant, and low dose opioids as needed  -PT  -LSO brace for support, ordered, follow up delivery  -OOB, Ambulate as tolerated  -No heavy lifting/twisting  -Recommend follow up w/ Dr. Batista in 2 weeks. Please call office to schedule appointment. Will obtain follow up xrays at that time  -Recommend Ca & Vit D supplementation  -Medical management per primary team  -All patient's questions answered. Patient understands and agrees w/ above plan.  -Discussed w/ Dr. Batista who is aware and agrees w/ above plan.     Assessment/Plan:   72y Female with Acute L3 vertebral compression fracture    -Pain control PRN - recommend acetaminophen, muscle relaxant, and low dose opioids as needed  -PT  -LSO brace for comfort, ordered, follow up delivery  -OOB, Ambulate as tolerated  -No heavy lifting/twisting  -Recommend follow up w/ Dr. Batista in 2 weeks. Please call office to schedule appointment. Will obtain follow up xrays at that time  -Recommend Ca & Vit D supplementation  -Medical management per primary team  -All patient's questions answered. Patient understands and agrees w/ above plan.  -Discussed w/ Dr. Batista who is aware and agrees w/ above plan.

## 2023-03-15 NOTE — H&P ADULT - NSHPLABSRESULTS_GEN_ALL_CORE
CBC:            8.5    67.09 )-----------( 1304     ( 03-14-23 @ 20:54 )             26.8         Chem:         ( 03-14-23 @ 20:54 )    130<L>  |  93<L>  |  44<H>  ----------------------------<  120<H>  5.9<H>   |  31  |  1.17        Liver Functions: ( 03-14-23 @ 20:54 )  Alb: 2.8 g/dL / Pro: 7.0 gm/dL / ALK PHOS: 147 U/L / ALT: 19 U/L / AST: 20 U/L / GGT: x              Type & Screen:     < from: CT Abdomen and Pelvis w/ IV Cont (03.14.23 @ 21:36) >    IMPRESSION:  Confluent pulmonary groundglass opacities with peripheral sparing as well   as bilateral pleural effusions and small to moderate pericardial effusion   in the setting of cardiomegaly. Findings may represent degenerative   pulmonary edema. Differential for the pulmonary opacities is inflammatory   infectious etiologies.    Massive hepatosplenomegaly. 2 wedge-shaped foci of hypoattenuation in the   spleen for which the differential includes age indeterminant infarct and   laceration. Infarct is favored given the lack of perisplenic hemorrhage.    Distal right ureteral stone with mild right hydronephrosis.    Acute mild superior endplate compression fracture of L3 with minimal bony   retropulsion.    Incidental note of a 5 mm right upper lobe pulmonary nodule. Fleischner   society recommendations for incidental pulmonary nodule follow-up:    >4-6mm:  Non-smoker: 12 month follow-up chest CT recommended and if unchanged, no   further follow-up necessary.  Smoker: Initial follow-up chest CT at 6-12 months then at 18-24 months if   no change.    >    < end of copied text >

## 2023-03-15 NOTE — PHARMACOTHERAPY INTERVENTION NOTE - COMMENTS
Medication reconciliation completed.  Patient was unable to provide medication information, spoke to  Tameran at bedside and they provided current medication list; confirmed with Dr. [facility].  Pt had been prescribed Xarelto 20 and took 1 dose while in Hospital and stopped via hematologist.  Pt  acknowledges that HH does not have pt's chemo med and can provide in the morning if needed.

## 2023-03-15 NOTE — CONSULT NOTE ADULT - NS ATTEND AMEND GEN_ALL_CORE FT
Son and  by bedside.  No prior h/o kidney stone, no family history.   No right flank pain.   Discussed treatment options. Patient and family agreeable to continue medical therapy.   Continue Flomax.   Follow up out patient. Son and  by bedside.  No prior h/o kidney stone, no family history.   No right flank pain.   Discussed treatment options. Patient and family agreeable to continue medical therapy.   Continue Flomax.   Follow up out patient.    Patient was seen and examined by me, agree with above note, where necessary edits were made.

## 2023-03-15 NOTE — CONSULT NOTE ADULT - ASSESSMENT
Myelofibrosis / HSM, Anemia , thrombocythemia on Fedratinib   Atrial fibrillation  Splenic infarcts  R flank/ back pain : Possibly nephrolithiases / fracture     Can resume Janus Kinase therapy IN am  No contraindication to anticoagulation ( A fib/ splenic infarcts)  Heparin followed by [facility] ( If the latter would be OK for Afib)   ELSA Mcmanus

## 2023-03-15 NOTE — PATIENT PROFILE ADULT - FALL HARM RISK - HARM RISK INTERVENTIONS

## 2023-03-15 NOTE — CONSULT NOTE ADULT - PROBLEM SELECTOR RECOMMENDATION 2
Pericardial effusion does not seem to be increasing in size -- an echo from 3/6/23 at [facility] describes a small-medium sized effusion (and normal LF systolic function).

## 2023-03-15 NOTE — PATIENT PROFILE ADULT - FALL HARM RISK - CONCLUSION
Speech-Language Pathology  Cancellation/No Show Note      For today's appointment patient:    [x]  Cancelled                  []  Rescheduled appointment    []  No show       []  Therapist cancelled             Reason given by patient:  []  No reason given  []  Conflicting appointment  []  No transportation  []  Conflict with work  []  Illness  []  Inclement weather   []  Insurance related issues  []  Other           Comments: Dad was stuck late at a car appointment and unable to bring him     Continue as per established Baudilio Owen M.S. [address]    1/21/2021
Fall with Harm Risk

## 2023-03-15 NOTE — CONSULT NOTE ADULT - ASSESSMENT
A/P:  71 y/o female with mild right hydronephrosis secondary to distal right kidney stone         A/P:  71 y/o female with mild right hydronephrosis secondary to distal right kidney stone    Strain all urine  Pain medications PRN  Can start Flomax 0.4 mg po 1x/day to try and help move stone into the bladder if not already in the bladder  Check Urine Culture results  Encourage po fluids  When pt discharged, pt can follow up with Dr. Valdez [phone]  Above discussed with Dr. Valdez

## 2023-03-15 NOTE — CONSULT NOTE ADULT - SUBJECTIVE AND OBJECTIVE BOX
CHIEF COMPLAINT: Patient is a 72y old  Female who presents with a chief complaint of fall/R flank pain (15 Mar 2023 09:08)    HPI:  71 year old woman with a history of atrial fibrillation (treated with metoprolol and aspirin), small-medium pericardial effusion, myelofibrosis, and recent pneumonia who presented to the ER with complaints of severe R flank and / posterior chest wall pain x 1 day after falling on her buttocks several days earlier. Cardiology was called to assist in the management of her atrial fibrillation.  I spoke to the patient's family at the bedside - report AF that was diagnosed ~ 2 years ago (not previously anticoagulated) -- sees Dr Osorio in [place].  Mrs. Moncada has no cardiac complaints -- specifically, no chest discomfort, dyspnea, or palpitations.    PAST MEDICAL & SURGICAL HISTORY:  Myelofibrosis  Atrial fibrillation  Pericardial effusion  PNA (pneumonia)  Anemia    SOCIAL HISTORY:   Alcohol: Denied  Smoking: Nonsmoker  Marital Status:     FAMILY HISTORY: No known heart disease    MEDICATIONS  (STANDING):  acetaminophen     Tablet .. 1000 milliGRAM(s) Oral every 8 hours  atorvastatin 10 milliGRAM(s) Oral at bedtime  diltiazem    Tablet 30 milliGRAM(s) Oral every 6 hours  furosemide   Injectable 40 milliGRAM(s) IV Push daily  heparin   Injectable 4500 Unit(s) IV Push once  heparin  Infusion.  Unit(s)/Hr (11 mL/Hr) IV Continuous <Continuous>  influenza  Vaccine (HIGH DOSE) 0.7 milliLiter(s) IntraMuscular once  naloxone Injectable 0.4 milliGRAM(s) IV Push once  pantoprazole    Tablet 40 milliGRAM(s) Oral before breakfast  polyethylene glycol 3350 17 Gram(s) Oral daily  senna 2 Tablet(s) Oral at bedtime  thiamine 100 milliGRAM(s) Oral daily    MEDICATIONS  (PRN):  aluminum hydroxide/magnesium hydroxide/simethicone Suspension 30 milliLiter(s) Oral every 4 hours PRN Dyspepsia  bisacodyl 5 milliGRAM(s) Oral daily PRN Constipation  heparin   Injectable 4500 Unit(s) IV Push every 6 hours PRN For aPTT less than 40  heparin   Injectable 2000 Unit(s) IV Push every 6 hours PRN For aPTT between 40 - 57  melatonin 3 milliGRAM(s) Oral at bedtime PRN Insomnia  morphine  - Injectable 2 milliGRAM(s) IV Push every 4 hours PRN Severe Pain (7 - 10)  ondansetron Injectable 4 milliGRAM(s) IV Push every 8 hours PRN Nausea and/or Vomiting  oxyCODONE    IR 5 milliGRAM(s) Oral every 4 hours PRN Moderate Pain (4 - 6)  oxyCODONE    IR 10 milliGRAM(s) Oral every 4 hours PRN Severe Pain (7 - 10)    Allergies: No Known Allergies    REVIEW OF SYSTEMS:  CONSTITUTIONAL: No fevers or chills  Eyes: No visual changes  NECK: No pain or stiffness  RESPIRATORY: No cough, wheezing, hemoptysis; No shortness of breath  CARDIOVASCULAR: No chest pain or palpitations  GASTROINTESTINAL: No abdominal pain.   GENITOURINARY: + Flank and buttock pain  NEUROLOGICAL: No numbness.  SKIN: No itching or rash  All other review of systems is negative unless indicated above    VITAL SIGNS:   Vital Signs Last 24 Hrs  T(C): 36.7 (15 Mar 2023 08:13), Max: 36.7 (15 Mar 2023 08:13)  T(F): 98.1 (15 Mar 2023 08:13), Max: 98.1 (15 Mar 2023 08:13)  HR: 106 (15 Mar 2023 08:13) (94 - 114)  BP: 113/78 (15 Mar 2023 08:13) (113/78 - 126/94)  BP(mean): 88 (14 Mar 2023 20:02) (88 - 88)  RR: 18 (15 Mar 2023 08:13) (18 - 20)  SpO2: 100% (15 Mar 2023 08:13) (92% - 100%)    PHYSICAL EXAM:  Constitutional: NAD, awake and alert  HEENT:  EOMI,  Pupils round, No oral cyanosis.  Pulmonary: Non-labored, breath sounds are clear bilaterally, No wheezing, rales or rhonchi  Cardiovascular: Irregular, mild tachycardia  Gastrointestinal: Bowel Sounds present, soft, nontender.   Lymph: No edema.   Neurological: Alert, no focal deficits  Skin: No rashes.  Psych:  Mood & affect appropriate    LABS:                 7.9    67.22 )-----------( 1332     ( 15 Mar 2023 10:38 )             25.8             126    |  92     |  34     ----------------------------<  123    4.8     |  29     |  1.03     12 Lead ECG (03.14.23 @ 21:56):  Atrial fibrillation with rapid ventricular response  Right ventricular hypertrophy  Possible Anterolateral infarct (cited on or before 14-MAR-2023)  Abnormal ECG    CT Chest w/ IV Cont (03.14.23 @ 21:35):  Confluent pulmonary groundglass opacities with peripheral sparing as well as bilateral pleural effusions and small to moderate pericardial effusion in the setting of cardiomegaly. Findings may represent degenerative pulmonary edema. Differential for the pulmonary opacities is inflammatory infectious etiologies.  Massive hepatosplenomegaly. 2 wedge-shaped foci of hypoattenuation in the spleen for which the differential includes age indeterminant infarct and laceration. Infarct is favored given the lack of perisplenic hemorrhage.  Distal right ureteral stone with mild right hydronephrosis.  Acute mild superior endplate compression fracture of L3 with minimal bony retropulsion.  Incidental note of a 5 mm right upper lobe pulmonary nodule.     Tele: AF rate ~ 100 bpm

## 2023-03-15 NOTE — CONSULT NOTE ADULT - SUBJECTIVE AND OBJECTIVE BOX
72y Female presents with L3 vertebral compression fracture after fall on Saturday. Patient reports she fell while coming home from the hospital after being treated for PNA. Has had right sided flank pain since fall. Denies other injuries at this time. Denies pain/injury elsewhere. Denies numbness/tingling/paresthesias/weakness/gait imbalance/clumsiness. Denies saddle anesthesia. Denies changes in bowel/bladder function. No other complaints at this time. Patient walks without assistance at baseline.    PAST MEDICAL & SURGICAL HISTORY:  Myelofibrosis      PNA (pneumonia)      Anemia        Home Medications:  Aspirin Enteric Coated 81 mg oral delayed release tablet: 1 tab(s) orally once a day (15 Mar 2023 00:06)  atorvastatin 10 mg oral tablet: 1 tab(s) orally once a day (15 Mar 2023 00:06)  cefuroxime 500 mg oral tablet: 1 tab(s) orally every 12 hours for 10 days  ***course not complete, 2-3 days remaining*** (15 Mar 2023 00:06)  cyanocobalamin 1000 mcg/mL injectable solution: injectable once a month  ***past due, scheduled for tomorrow at Hematologist*** (15 Mar 2023 00:06)  fedratinib 100 mg oral capsule: 2 cap(s) orally once a day  ***pt  can provide med from home*** (15 Mar 2023 00:06)  Metoprolol Succinate ER 50 mg oral tablet, extended release: 1 tab(s) orally once a day (15 Mar 2023 00:06)  omeprazole 20 mg oral delayed release capsule: 1 cap(s) orally 2 times a day, As Needed (15 Mar 2023 00:06)  thiamine 100 mg oral tablet: 1 tab(s) orally once a day with Inrebic (15 Mar 2023 00:06)    Allergies    No Known Allergies    Intolerances                              7.9    67.22 )-----------( 1332     ( 15 Mar 2023 10:38 )             25.8     03-15    126<L>  |  92<L>  |  34<H>  ----------------------------<  123<H>  4.8   |  29  |  1.03    Ca    8.1<L>      15 Mar 2023 10:38    TPro  7.0  /  Alb  2.8<L>  /  TBili  0.3  /  DBili  x   /  AST  19  /  ALT  17  /  AlkPhos  154<H>  03-15    PT/INR - ( 15 Mar 2023 10:38 )   PT: 12.9 sec;   INR: 1.11 ratio           Urinalysis Basic - ( 14 Mar 2023 20:47 )    Color: Yellow / Appearance: Clear / S.010 / pH: x  Gluc: x / Ketone: Negative  / Bili: Negative / Urobili: Negative   Blood: x / Protein: 30 mg/dL / Nitrite: Negative   Leuk Esterase: Negative / RBC: 0-2 /HPF / WBC 11-25 /HPF   Sq Epi: x / Non Sq Epi: x / Bacteria: Few        Vital Signs Last 24 Hrs  T(C): 36.7 (15 Mar 2023 08:13), Max: 36.7 (15 Mar 2023 08:13)  T(F): 98.1 (15 Mar 2023 08:13), Max: 98.1 (15 Mar 2023 08:13)  HR: 106 (15 Mar 2023 08:13) (94 - 114)  BP: 113/78 (15 Mar 2023 08:13) (113/78 - 126/94)  BP(mean): 88 (14 Mar 2023 20:02) (88 - 88)  RR: 18 (15 Mar 2023 08:13) (18 - 20)  SpO2: 100% (15 Mar 2023 08:13) (92% - 100%)    Parameters below as of 15 Mar 2023 08:13  Patient On (Oxygen Delivery Method): nasal cannula  O2 Flow (L/min): 2      PHYSICAL EXAM  GEN: NAD, AAOx3    SPINE:  Skin intact  TTP over the bony prominences of the cervical // thoracic // lumbar // sacral spine  NTTP over the bony prominences of the cervical // thoracic // lumbar // sacral spine  + Paraspinal TTP of the cervical // thoracic // lumbar // sacral spine  No bony step-offs // + bony step-off @ ????  Grossly moving all extremities  + Radial Pulses  + DP/PT Pulses  No saddle anesthesia  + // - rectal tone      MOTOR EXAM:                          Elbow Flex (C5)     Wrist Ext (C6)     Elbow Ext (C7)      Finger Flex (C8)    Finger Abduction (T1)  RIGHT                 5/5                         5/5                         5/5                          5/5                              5/5  LEFT                    5/5                         5/5                         5/5                          5/5                              5/5                           Hip Flex (L2)      Knee Ext (L3)      Ank Dorsiflex (L4)     Hallux Ext (L5)     Ank PlantarFlex (S1)  RIGHT               5/5                      5/5                          5/5                            5/5                           5/5  LEFT                  5/5                      5/5                          5/5                            5/5                           5/5      SENSORY EXAM:                        C5      C6      C7      C8       T1          RIGHT          2         2        2         2         2          (0=absent, 1=impaired, 2=normal, NT=not testable)  LEFT             2         2        2         2         2          (0=absent, 1=impaired, 2=normal, NT=not testable)                        L2        L3       L4      L5       S1          RIGHT        2          2         2        2        2           (0=absent, 1=impaired, 2=normal, NT=not testable)  LEFT           2          2         2        2        2           (0=absent, 1=impaired, 2=normal, NT=not testable)    Negative Kelley's sign bilaterally  Negative Myoclonus bilaterally        Imaging:   CT Abd/pel: Mild L3 vertebral compression fracture

## 2023-03-15 NOTE — CONSULT NOTE ADULT - PROBLEM SELECTOR RECOMMENDATION 9
According to family AF is not a new diagnosis -- I reviewed ECGs from earlier this month from [facility] that revealed AF.  I tried to discuss management with her outpatient cardiologist -- Dr Khris Osorio ([phone]) -- I left a message requesting a return call.    I agree with uptitration of metoprolol in effort to optimize HR and anticoagulation given possible splenic infarct.    * I discussed with Dr. Rios.

## 2023-03-15 NOTE — H&P ADULT - HISTORY OF PRESENT ILLNESS
72 yo female with a PMHx of myelofibrosis, PNA, anemia, on baby ASA presents ambulatory to ED c/o worsening severe R flank/posteior chest wall pain x 1 day. Patient fell on her buttocks on Saturday was brought to .  Patient took Tylenol prior to arrival with no relief. Pt is on baby ASA. Pt is on O2 for recent diagnosis for PNA 10-12 days for which she is on cefuroxime and  was in [facility], received recent blood transfusion for anemia recently. Pt reports her flank pain feels similar to when she was admitted for PNA recently. Pt cannot have Ibuprofen due to her myelofibrosis.  Pt with recent fall 3 days ago worsening R flank and R posterior chest wall pain. Recent hospitalization for PNA with symptoms improving. Pain not controlled at home with Tylenol. Plan for Morphine for pain control, CT chest/abdomen/pelvis revealed B/L pleural effusions and mod sized pericardial effusion with cardiomegaly and possible pulm edema. Massive hepatosplenomegaly with 2 wegde shaped infarcts to liver. alos found was mild right ureteral stone with mild right hydro/ An acute L3 compression fracture was identified. small 5mm RU nodule.   ED provider noted:  in comparison to recent outpatient labs that we reviewed on her portal, white count has increased from 40s to 60s, hemoglobin improved from 6.6 to above 8, platelets improved from 14 100-13 100.  Bandemia remains similar.  These are all baseline per patient and family. Creatinine normal    Ed course: found to be in new onset rapid afib HR range 110-120bpm.   EKG: Afib with RVR, with RV hypertrophy. Poor R wave progression. No dynamic stt changes. QTc normal. Tele:  presently. TNI negative x 1.  Serum potassium 5.9 with Na 130. Given lokelma, morphine and 2L normal saline        PAST MEDICAL/SURGICAL/FAMILY/SOCIAL HISTORY:    Past Medical, Past Surgical, and Family History:  PAST MEDICAL HISTORY:  Anemia     Myelofibrosis     PNA (pneumonia).  Shx/Fhx: unknown  Social: neg x 3    ALLERGIES AND HOME MEDICATIONS:   Allergies:        Allergies:  	Allergy Status Unknown:

## 2023-03-15 NOTE — CONSULT NOTE ADULT - SUBJECTIVE AND OBJECTIVE BOX
72 yo female with a PMHx of myelofibrosis, PNA, anemia, on baby ASA presents ambulatory to ED c/o worsening severe R flank/posteior chest wall pain x 1 day. Patient fell on her buttocks on Saturday was brought to .  Patient took Tylenol prior to arrival with no relief. Pt is on baby ASA. Pt is on O2 for recent diagnosis for PNA 10-12 days for which she is on cefuroxime and  [facility] received [facility] transfusion for anemia recently. Pt reports her flank pain feels similar to when she was admitted for PNA recently. Pt cannot have Ibuprofen due to her myelofibrosis.  Pt with recent fall 3 days ago worsening R flank and R posterior chest wall pain. Recent hospitalization for PNA with symptoms improving. Pain not controlled at home with Tylenol. Plan for Morphine for pain control, CT chest/abdomen/pelvis revealed B/L pleural effusions and mod sized pericardial effusion with cardiomegaly and possible pulm edema. Massive hepatosplenomegaly with 2 wegde shaped infarcts to liver. alos found was mild right ureteral stone with mild right hydro/ An acute L3 compression fracture was identified. small 5mm RU nodule.   ED provider noted:  in comparison to recent outpatient labs that we reviewed on her portal, white count has increased from 40s to 60s, hemoglobin improved from 6.6 to above 8, platelets improved from 14 100-13 100.  Bandemia remains similar.  These are all baseline per patient and family. Creatinine normal    Called to see pt because of mild right hydro and distal right ureteral stone.        PAST MEDICAL/SURGICAL/FAMILY/SOCIAL HISTORY:    Past Medical, Past Surgical, and Family History:  PAST MEDICAL HISTORY:  Anemia     Myelofibrosis     PNA (pneumonia).  Shx/Fhx: unknown  Social: neg x 3    ALLERGIES AND HOME MEDICATIONS:     Allergies and Intolerances:        Allergies:  	No Known Allergies:     Home Medications:   * Patient Currently Takes Medications as of 15-Mar-2023 00:06 documented in Structured Notes  · 	cefuroxime 500 mg oral tablet: Last Dose Taken:  , 1 tab(s) orally every 12 hours for 10 days  	***course not complete, 2-3 days remaining***  · 	Metoprolol Succinate ER 50 mg oral tablet, extended release: Last Dose Taken:  , 1 tab(s) orally once a day  · 	fedratinib 100 mg oral capsule: Last Dose Taken:  , 2 cap(s) orally once a day  	***pt  can provide med from home***  · 	atorvastatin 10 mg oral tablet: Last Dose Taken:  , 1 tab(s) orally once a day  · 	omeprazole 20 mg oral delayed release capsule: 1 cap(s) orally 2 times a day, As Needed  · 	Aspirin Enteric Coated 81 mg oral delayed release tablet: Last Dose Taken:  , 1 tab(s) orally once a day  Vital Signs Last 24 Hrs  T(C): 36.7 (15 Mar 2023 08:13), Max: 36.7 (15 Mar 2023 08:13)  T(F): 98.1 (15 Mar 2023 08:13), Max: 98.1 (15 Mar 2023 08:13)  HR: 106 (15 Mar 2023 08:13) (94 - 114)  BP: 113/78 (15 Mar 2023 08:13) (113/78 - 126/94)  BP(mean): 88 (14 Mar 2023 20:02) (88 - 88)  RR: 18 (15 Mar 2023 08:13) (18 - 20)  SpO2: 100% (15 Mar 2023 08:13) (92% - 100%)    Parameters below as of 15 Mar 2023 08:13  Patient On (Oxygen Delivery Method): nasal cannula  O2 Flow (L/min): 2  · 	thiamine 100 mg oral tablet: 1 tab(s) orally once a day with Inrebic  · 	cyanocobalamin 1000 mcg/mL injectable solution: Last Dose Taken:  , injectable once a month  	***past due, scheduled for tomorrow at Hematologist***     Review of Systems:  Review of Systems: REVIEW OF SYSTEMS:    CONSTITUTIONAL: No weakness, fevers or chills  EYES/ENT: No visual changes;  No vertigo or throat pain   NECK: No pain or stiffness  RESPIRATORY: No cough, wheezing, hemoptysis; No shortness of breath  CARDIOVASCULAR: No chest pain or palpitations  GASTROINTESTINAL: No abdominal or epigastric pain. No nausea, vomiting, or hematemesis; No diarrhea or constipation. No melena or hematochezia.  GENITOURINARY: No dysuria, frequency or hematuria  NEUROLOGICAL: No numbness or weakness  SKIN: No itching, burning, rashes, or lesions   All other review of systems is negative unless indicated above      Patient History:    Social History:  · Substance use	No  · Social History (marital status, living situation, occupation, and sexual history)	as above     Tobacco Screening:  · Core Measure Site	Yes  · Has the patient used tobacco in the past 30 days?	No    Risk Assessment:    Present on Admission:  Deep Venous Thrombosis	no  Pulmonary Embolus	no    PE:  71 y/o female resting in bed    Vital Signs Last 24 Hrs  T(C): 36.7 (15 Mar 2023 08:13), Max: 36.7 (15 Mar 2023 08:13)  T(F): 98.1 (15 Mar 2023 08:13), Max: 98.1 (15 Mar 2023 08:13)  HR: 106 (15 Mar 2023 08:13) (94 - 114)  BP: 113/78 (15 Mar 2023 08:13) (113/78 - 126/94)  BP(mean): 88 (14 Mar 2023 20:02) (88 - 88)  RR: 18 (15 Mar 2023 08:13) (18 - 20)  SpO2: 100% (15 Mar 2023 08:13) (92% - 100%)    Parameters below as of 15 Mar 2023 08:13  Patient On (Oxygen Delivery Method): nasal cannula  O2 Flow (L/min): 2    Head:    Neck:  Heart:  Lungs:   Back:  Abdomen:  Lower Ext:  Neuro:  Skin:       LABS:                          7.9    67.22 )-----------( 1332     ( 15 Mar 2023 10:38 )             25.8     03-15    126<L>  |  92<L>  |  34<H>  ----------------------------<  123<H>  4.8   |  29  |  1.03    Ca    8.1<L>      15 Mar 2023 10:38    TPro  7.0  /  Alb  2.8<L>  /  TBili  0.3  /  DBili  x   /  AST  19  /  ALT  17  /  AlkPhos  154<H>  03-15    LIVER FUNCTIONS - ( 15 Mar 2023 10:38 )  Alb: 2.8 g/dL / Pro: 7.0 gm/dL / ALK PHOS: 154 U/L / ALT: 17 U/L / AST: 19 U/L / GGT: x           PT/INR - ( 15 Mar 2023 10:38 )   PT: 12.9 sec;   INR: 1.11 ratio           Urinalysis Basic - ( 14 Mar 2023 20:47 )    Color: Yellow / Appearance: Clear / S.010 / pH: x  Gluc: x / Ketone: Negative  / Bili: Negative / Urobili: Negative   Blood: x / Protein: 30 mg/dL / Nitrite: Negative   Leuk Esterase: Negative / RBC: 0-2 /HPF / WBC 11-25 /HPF   Sq Epi: x / Non Sq Epi: x / Bacteria: Few    ACC: [id number] EXAM:  CT ABDOMEN AND PELVIS IC   ORDERED BY: LORRIE MORALES     ACC: [id number] EXAM:  CT CHEST IC   ORDERED BY: JASMINE ROMO     PROCEDURE DATE:  2023          INTERPRETATION:  CLINICAL INFORMATION: Status post fall several days ago   with right posterior chest and right flank pain.    COMPARISON: None.    CONTRAST/COMPLICATIONS:  IV Contrast: Omnipaque 350 (accession [id number]), IV contrast documented   in unlinked concurrent exam (accession [id number])  90 cc administered 10   cc discarded  Oral Contrast: NONE  Complications: None reported at time of study completion    PROCEDURE:  CT of the Chest, Abdomen and Pelvis was performed.  Sagittal and coronal reformats were performed.    FINDINGS:  CHEST:  LUNGS AND LARGE AIRWAYS: Patent central airways. Confluent groundglass   opacities bilaterally with peripheral sparing. A 5 mm right upper lobe   pulmonary nodule..  PLEURA: Small right and trace left pleural effusions. No pneumothorax..  VESSELS: Within normal limits.  HEART: Heart size is normal. Small to moderate pericardial effusion.  MEDIASTINUM AND LAQUITA: No lymphadenopathy.  CHEST WALL AND LOWER NECK: A 1.1 cm hypodense right thyroid lobe nodule..    ABDOMEN AND PELVIS:  LIVER: Marked hepatomegaly with a craniocaudad length of 27 cm. Scattered   subcentimeter indeterminant hypodense foci, too small to characterize.  BILE DUCTS: Normal caliber.  GALLBLADDER: Within normal limits.  SPLEEN: Massive splenomegaly with a craniocaudad length of 26 cm.   Wedge-shaped areas of hypoattenuation in the superior and anterior spleen   with trace perisplenic fluid.  PANCREAS: Within normal limits.  ADRENALS: Within normal limits.  KIDNEYS/URETERS: Delayed right nephrogram with mild hydroureteronephrosis   to the level of a 4 mm distal right ureteral stone. Mild perinephric   fluid. Right renal cyst. Left kidney within normal limits..    BLADDER: Within normal limits.  REPRODUCTIVE ORGANS: Uterus and adnexa within normal limits.    BOWEL: No bowel obstruction. Appendix is normal.  PERITONEUM: Trace perisplenic, perihepatic, and pelvic free fluid..  VESSELS: Patulous portal and splenic veins.. Atherosclerotic   calcifications.  RETROPERITONEUM/LYMPH NODES: No lymphadenopathy.  ABDOMINAL WALL: Asymmetric right gluteal subcutaneous edema and   asymmetric enlargement of the right gluteal musculature..  BONES: Mild superior endplate compression fracture of L3 with minimal   bony retropulsion. Trace paravertebral hemorrhage..    IMPRESSION:  Confluent pulmonary groundglass opacities with peripheral sparing as well   as bilateral pleural effusions and small to moderate pericardial effusion   in the setting of cardiomegaly. Findings may represent degenerative   pulmonary edema. Differential for the pulmonary opacities is inflammatory   infectious etiologies.    Massive hepatosplenomegaly. 2 wedge-shaped foci of hypoattenuation in the   spleen for which the differential includes age indeterminant infarct and   laceration. Infarct is favored given the lack of perisplenic hemorrhage.    Distal right ureteral stone with mild right hydronephrosis.    Acute mild superior endplate compression fracture of L3 with minimal bony   retropulsion.    Incidental note of a 5 mm right upper lobe pulmonary nodule. Fleischner   society recommendations for incidental pulmonary nodule follow-up:    >4-6mm:  Non-smoker: 12 month follow-up chest CT recommended and if unchanged, no   further follow-up necessary.  Smoker: Initial follow-up chest CT at 6-12 months then at 18-24 months if   no change.     71 yo female with a PMHx of myelofibrosis, PNA, anemia, on baby ASA presents ambulatory to ED c/o worsening severe R flank/posteior chest wall pain x 1 day. Patient fell on her buttocks on Saturday was brought to .  Patient took Tylenol prior to arrival with no relief. Pt is on baby ASA. Pt is on O2 for recent diagnosis for PNA 10-12 days for which she is on cefuroxime and  [facility] received recent blood transfusion for anemia recently. Pt reports her flank pain feels similar to when she was admitted for PNA recently. Pt cannot have Ibuprofen due to her myelofibrosis.  Pt with recent fall 3 days ago worsening R flank and R posterior chest wall pain. Recent hospitalization for PNA with symptoms improving. Pain not controlled at home with Tylenol. Plan for Morphine for pain control, CT chest/abdomen/pelvis revealed B/L pleural effusions and mod sized pericardial effusion with cardiomegaly and possible pulm edema. Massive hepatosplenomegaly with 2 wegde shaped infarcts to liver. alos found was mild right ureteral stone with mild right hydro/ An acute L3 compression fracture was identified. small 5mm RU nodule.     Called to see pt because of mild right hydro and distal right ureteral stone. Pt was having right flank pain that did not radiate.  Denies fever, chills, N/V, hematuria or any other complaints        PAST MEDICAL/SURGICAL/FAMILY/SOCIAL HISTORY:    Past Medical, Past Surgical, and Family History:  PAST MEDICAL HISTORY:  Anemia     Myelofibrosis     PNA (pneumonia).  Shx/Fhx: unknown  Social: neg x 3    ALLERGIES AND HOME MEDICATIONS:     Allergies and Intolerances:        Allergies:  	No Known Allergies:     Home Medications:   * Patient Currently Takes Medications as of 15-Mar-2023 00:06 documented in Structured Notes  · 	cefuroxime 500 mg oral tablet: Last Dose Taken:  , 1 tab(s) orally every 12 hours for 10 days  	***course not complete, 2-3 days remaining***  · 	Metoprolol Succinate ER 50 mg oral tablet, extended release: Last Dose Taken:  , 1 tab(s) orally once a day  · 	fedratinib 100 mg oral capsule: Last Dose Taken:  , 2 cap(s) orally once a day  	***pt  can provide med from home***  · 	atorvastatin 10 mg oral tablet: Last Dose Taken:  , 1 tab(s) orally once a day  · 	omeprazole 20 mg oral delayed release capsule: 1 cap(s) orally 2 times a day, As Needed  · 	Aspirin Enteric Coated 81 mg oral delayed release tablet: Last Dose Taken:  , 1 tab(s) orally once a day    Parameters below as of 15 Mar 2023 08:13  Patient On (Oxygen Delivery Method): nasal cannula  O2 Flow (L/min): 2  · 	thiamine 100 mg oral tablet: 1 tab(s) orally once a day with Inrebic  · 	cyanocobalamin 1000 mcg/mL injectable solution: Last Dose Taken:  , injectable once a month  	***past due, scheduled for tomorrow at Hematologist***     Review of Systems:  Review of Systems: REVIEW OF SYSTEMS:    CONSTITUTIONAL: No weakness, fevers or chills  EYES/ENT: No visual changes;  No vertigo or throat pain   NECK: No pain or stiffness  RESPIRATORY: No cough, wheezing, hemoptysis; No shortness of breath  CARDIOVASCULAR: No chest pain or palpitations  GASTROINTESTINAL: No abdominal or epigastric pain. No nausea, vomiting, or hematemesis; No diarrhea or constipation. No melena or hematochezia.  GENITOURINARY: No dysuria, frequency or hematuria  NEUROLOGICAL: No numbness or weakness  SKIN: No itching, burning, rashes, or lesions   All other review of systems is negative unless indicated above      Patient History:    Social History:  · Substance use	No  · Social History (marital status, living situation, occupation, and sexual history)	as above     Tobacco Screening:  · Core Measure Site	Yes  · Has the patient used tobacco in the past 30 days?	No    Risk Assessment:    Present on Admission:  Deep Venous Thrombosis	no  Pulmonary Embolus	no    PE:  71 y/o female resting in bed in no acute distress    Vital Signs Last 24 Hrs  T(C): 36.7 (15 Mar 2023 08:13), Max: 36.7 (15 Mar 2023 08:13)  T(F): 98.1 (15 Mar 2023 08:13), Max: 98.1 (15 Mar 2023 08:13)  HR: 106 (15 Mar 2023 08:13) (94 - 114)  BP: 113/78 (15 Mar 2023 08:13) (113/78 - 126/94)  BP(mean): 88 (14 Mar 2023 20:02) (88 - 88)  RR: 18 (15 Mar 2023 08:13) (18 - 20)  SpO2: 100% (15 Mar 2023 08:13) (92% - 100%)    Parameters below as of 15 Mar 2023 08:13  Patient On (Oxygen Delivery Method): nasal cannula  O2 Flow (L/min): 2    Head: NC/AT, no lesions noted   Neck: Soft/supple  Heart: RRR S1S2 normal  Lungs:  CTA bilat, no rales, rhonchi or wheezes  Back: Mild right CVA tenderness  Abdomen: Soft NT/ND, +BS                   No bladder palp  Lower Ext: No calf tenderness  Neuro: Grossly intact  Skin: Warm and dry      LABS:                          7.9    67.22 )-----------( 1332     ( 15 Mar 2023 10:38 )             25.8     03-15    126<L>  |  92<L>  |  34<H>  ----------------------------<  123<H>  4.8   |  29  |  1.03    Ca    8.1<L>      15 Mar 2023 10:38    TPro  7.0  /  Alb  2.8<L>  /  TBili  0.3  /  DBili  x   /  AST  19  /  ALT  17  /  AlkPhos  154<H>  03-15    LIVER FUNCTIONS - ( 15 Mar 2023 10:38 )  Alb: 2.8 g/dL / Pro: 7.0 gm/dL / ALK PHOS: 154 U/L / ALT: 17 U/L / AST: 19 U/L / GGT: x           PT/INR - ( 15 Mar 2023 10:38 )   PT: 12.9 sec;   INR: 1.11 ratio           Urinalysis Basic - ( 14 Mar 2023 20:47 )    Color: Yellow / Appearance: Clear / S.010 / pH: x  Gluc: x / Ketone: Negative  / Bili: Negative / Urobili: Negative   Blood: x / Protein: 30 mg/dL / Nitrite: Negative   Leuk Esterase: Negative / RBC: 0-2 /HPF / WBC 11-25 /HPF   Sq Epi: x / Non Sq Epi: x / Bacteria: Few    ACC: [id number] EXAM:  CT ABDOMEN AND PELVIS IC   ORDERED BY: LORRIE MORALES     ACC: [id number] EXAM:  CT CHEST IC   ORDERED BY: JASMINE ROMO     PROCEDURE DATE:  2023          INTERPRETATION:  CLINICAL INFORMATION: Status post fall several days ago   with right posterior chest and right flank pain.    COMPARISON: None.    CONTRAST/COMPLICATIONS:  IV Contrast: Omnipaque 350 (accession [id number]), IV contrast documented   in unlinked concurrent exam (accession [id number])  90 cc administered 10   cc discarded  Oral Contrast: NONE  Complications: None reported at time of study completion    PROCEDURE:  CT of the Chest, Abdomen and Pelvis was performed.  Sagittal and coronal reformats were performed.    FINDINGS:  CHEST:  LUNGS AND LARGE AIRWAYS: Patent central airways. Confluent groundglass   opacities bilaterally with peripheral sparing. A 5 mm right upper lobe   pulmonary nodule..  PLEURA: Small right and trace left pleural effusions. No pneumothorax..  VESSELS: Within normal limits.  HEART: Heart size is normal. Small to moderate pericardial effusion.  MEDIASTINUM AND LAQUITA: No lymphadenopathy.  CHEST WALL AND LOWER NECK: A 1.1 cm hypodense right thyroid lobe nodule..    ABDOMEN AND PELVIS:  LIVER: Marked hepatomegaly with a craniocaudad length of 27 cm. Scattered   subcentimeter indeterminant hypodense foci, too small to characterize.  BILE DUCTS: Normal caliber.  GALLBLADDER: Within normal limits.  SPLEEN: Massive splenomegaly with a craniocaudad length of 26 cm.   Wedge-shaped areas of hypoattenuation in the superior and anterior spleen   with trace perisplenic fluid.  PANCREAS: Within normal limits.  ADRENALS: Within normal limits.  KIDNEYS/URETERS: Delayed right nephrogram with mild hydroureteronephrosis   to the level of a 4 mm distal right ureteral stone. Mild perinephric   fluid. Right renal cyst. Left kidney within normal limits..    BLADDER: Within normal limits.  REPRODUCTIVE ORGANS: Uterus and adnexa within normal limits.    BOWEL: No bowel obstruction. Appendix is normal.  PERITONEUM: Trace perisplenic, perihepatic, and pelvic free fluid..  VESSELS: Patulous portal and splenic veins.. Atherosclerotic   calcifications.  RETROPERITONEUM/LYMPH NODES: No lymphadenopathy.  ABDOMINAL WALL: Asymmetric right gluteal subcutaneous edema and   asymmetric enlargement of the right gluteal musculature..  BONES: Mild superior endplate compression fracture of L3 with minimal   bony retropulsion. Trace paravertebral hemorrhage..    IMPRESSION:  Confluent pulmonary groundglass opacities with peripheral sparing as well   as bilateral pleural effusions and small to moderate pericardial effusion   in the setting of cardiomegaly. Findings may represent degenerative   pulmonary edema. Differential for the pulmonary opacities is inflammatory   infectious etiologies.    Massive hepatosplenomegaly. 2 wedge-shaped foci of hypoattenuation in the   spleen for which the differential includes age indeterminant infarct and   laceration. Infarct is favored given the lack of perisplenic hemorrhage.    Distal right ureteral stone with mild right hydronephrosis.    Acute mild superior endplate compression fracture of L3 with minimal bony   retropulsion.    Incidental note of a 5 mm right upper lobe pulmonary nodule. Fleischner   society recommendations for incidental pulmonary nodule follow-up:    >4-6mm:  Non-smoker: 12 month follow-up chest CT recommended and if unchanged, no   further follow-up necessary.  Smoker: Initial follow-up chest CT at 6-12 months then at 18-24 months if   no change.

## 2023-03-15 NOTE — H&P ADULT - NSHPPHYSICALEXAM_GEN_ALL_CORE
ICU Vital Signs Last 24 Hrs  T(C): 36.7 (15 Mar 2023 08:13), Max: 36.7 (15 Mar 2023 08:13)  T(F): 98.1 (15 Mar 2023 08:13), Max: 98.1 (15 Mar 2023 08:13)  HR: 106 (15 Mar 2023 08:13) (94 - 114)  BP: 113/78 (15 Mar 2023 08:13) (113/78 - 126/94)  BP(mean): 88 (14 Mar 2023 20:02) (88 - 88)  ABP: --  ABP(mean): --  RR: 18 (15 Mar 2023 08:13) (18 - 20)  SpO2: 100% (15 Mar 2023 08:13) (92% - 100%)    O2 Parameters below as of 15 Mar 2023 08:13  Patient On (Oxygen Delivery Method): nasal cannula  O2 Flow (L/min): 2          PHYSICAL EXAM:    Constitutional: NAD, awake and alert  HEENT: PERR, EOMI, Normal Hearing, MMM  Neck: Soft and supple, No LAD, No JVD  Respiratory: Breath sounds are clear bilaterally, No wheezing, rales or rhonchi  Cardiovascular: S1 and S2, regular rate and rhythm, no Murmurs, gallops or rubs  Gastrointestinal: Bowel Sounds present, soft, nontender, nondistended, no guarding, no rebound  Extremities: No peripheral edema  Vascular: 2+ peripheral pulses  Neurological: A/O x 3, no focal deficits  Musculoskeletal: 5/5 strength b/l upper and lower extremities  Skin: No rashes

## 2023-03-15 NOTE — CONSULT NOTE ADULT - SUBJECTIVE AND OBJECTIVE BOX
HPI:  72 yo female with a PMHx of myelofibrosis / massive HSM , managed with Dr Mcmanus at [facility] ; On Fedratinib and ASA / presents ambulatory to ED c/o worsening severe R flank/posteior chest wall pain x 1 day. Patient fell on her buttocks on Saturday was brought to .  Patient took Tylenol prior to arrival with no relief. Pt is on baby ASA. Pt is on O2 for recent diagnosis for PNA 10-12 days for which she is on cefuroxime and  was in [facility], received recent blood transfusion for anemia recently. Pt reports her flank pain feels similar to when she was admitted for PNA recently. Pt cannot have Ibuprofen due to her myelofibrosis.  Pt with recent fall 3 days ago worsening R flank and R posterior chest wall pain. Recent hospitalization for PNA with symptoms improving. Pain not controlled at home with Tylenol. Plan for Morphine for pain control, CT chest/abdomen/pelvis revealed B/L pleural effusions and mod sized pericardial effusion with cardiomegaly and possible pulm edema. Massive hepatosplenomegaly with 2 wedge shaped infarcts to liver. also found was mild right ureteral stone with mild right hydro/ An acute L3 compression fracture was identified. small 5mm RU nodule.   ED provider noted:  in comparison to recent outpatient labs that we reviewed on her portal, white count has increased from 40s to 60s, hemoglobin improved from 6.6 to above 8, platelets improved from 14 100-13 100.  Bandemia remains similar.  These are all baseline per patient and family. Creatinine normal    Ed course: found to be in new onset rapid afib HR range 110-120bpm.         PAST MEDICAL/SURGICAL/FAMILY/SOCIAL HISTORY:    Past Medical, Past Surgical, and Family History:  PAST MEDICAL HISTORY:  Anemia     Myelofibrosis     PNA (pneumonia).  Shx/Fhx: unknown  Social: neg x 3    ALLERGIES AND HOME MEDICATIONS:   Allergies:        Allergies:  	Allergy Status Unknown:    (15 Mar 2023 09:08)      PAST MEDICAL & SURGICAL HISTORY:  Myelofibrosis      PNA (pneumonia)      Anemia          MEDICATIONS  (STANDING):  acetaminophen     Tablet .. 1000 milliGRAM(s) Oral every 8 hours  atorvastatin 10 milliGRAM(s) Oral at bedtime  diltiazem    Tablet 30 milliGRAM(s) Oral every 6 hours  furosemide   Injectable 40 milliGRAM(s) IV Push daily  heparin  Infusion.  Unit(s)/Hr (11 mL/Hr) IV Continuous <Continuous>  influenza  Vaccine (HIGH DOSE) 0.7 milliLiter(s) IntraMuscular once  naloxone Injectable 0.4 milliGRAM(s) IV Push once  pantoprazole    Tablet 40 milliGRAM(s) Oral before breakfast  polyethylene glycol 3350 17 Gram(s) Oral daily  senna 2 Tablet(s) Oral at bedtime  thiamine 100 milliGRAM(s) Oral daily    MEDICATIONS  (PRN):  aluminum hydroxide/magnesium hydroxide/simethicone Suspension 30 milliLiter(s) Oral every 4 hours PRN Dyspepsia  bisacodyl 5 milliGRAM(s) Oral daily PRN Constipation  heparin   Injectable 4500 Unit(s) IV Push every 6 hours PRN For aPTT less than 40  heparin   Injectable 2000 Unit(s) IV Push every 6 hours PRN For aPTT between 40 - 57  melatonin 3 milliGRAM(s) Oral at bedtime PRN Insomnia  morphine  - Injectable 2 milliGRAM(s) IV Push every 4 hours PRN Severe Pain (7 - 10)  ondansetron Injectable 4 milliGRAM(s) IV Push every 8 hours PRN Nausea and/or Vomiting  oxyCODONE    IR 5 milliGRAM(s) Oral every 4 hours PRN Moderate Pain (4 - 6)  oxyCODONE    IR 10 milliGRAM(s) Oral every 4 hours PRN Severe Pain (7 - 10)      Allergies    No Known Allergies    Intolerances        SOCIAL HISTORY:    FAMILY HISTORY:      Vital Signs Last 24 Hrs  T(C): 36.7 (15 Mar 2023 08:13), Max: 36.7 (15 Mar 2023 08:13)  T(F): 98.1 (15 Mar 2023 08:13), Max: 98.1 (15 Mar 2023 08:13)  HR: 106 (15 Mar 2023 18:27) (104 - 114)  BP: 100/62 (15 Mar 2023 18:27) (100/62 - 126/94)  BP(mean): --  RR: 18 (15 Mar 2023 08:13) (18 - 20)  SpO2: 100% (15 Mar 2023 08:13) (95% - 100%)    Parameters below as of 15 Mar 2023 08:13  Patient On (Oxygen Delivery Method): nasal cannula  O2 Flow (L/min): 2    NAD   Comfortable  No nodes  Lungs clear  Cor irregular RR  Abd Sig HSM  Ext neg      LABS:                        7.7    68.49 )-----------( 1335     ( 15 Mar 2023 17:43 )             24.9     03-15    126<L>  |  92<L>  |  34<H>  ----------------------------<  123<H>  4.8   |  29  |  1.03    Ca    8.1<L>      15 Mar 2023 10:38    TPro  7.0  /  Alb  2.8<L>  /  TBili  0.3  /  DBili  x   /  AST  19  /  ALT  17  /  AlkPhos  154<H>  03-15    PT/INR - ( 15 Mar 2023 10:38 )   PT: 12.9 sec;   INR: 1.11 ratio         PTT - ( 15 Mar 2023 17:43 )  PTT:36.1 sec  Urinalysis Basic - ( 14 Mar 2023 20:47 )    Color: Yellow / Appearance: Clear / S.010 / pH: x  Gluc: x / Ketone: Negative  / Bili: Negative / Urobili: Negative   Blood: x / Protein: 30 mg/dL / Nitrite: Negative   Leuk Esterase: Negative / RBC: 0-2 /HPF / WBC 11-25 /HPF   Sq Epi: x / Non Sq Epi: x / Bacteria: Few

## 2023-03-16 DIAGNOSIS — N13.2 HYDRONEPHROSIS WITH RENAL AND URETERAL CALCULOUS OBSTRUCTION: ICD-10-CM

## 2023-03-16 LAB
A1C WITH ESTIMATED AVERAGE GLUCOSE RESULT: 5.4 % — SIGNIFICANT CHANGE UP (ref 4–5.6)
ALBUMIN SERPL ELPH-MCNC: 2.7 G/DL — LOW (ref 3.3–5)
ALP SERPL-CCNC: 143 U/L — HIGH (ref 40–120)
ALT FLD-CCNC: 13 U/L — SIGNIFICANT CHANGE UP (ref 12–78)
ANION GAP SERPL CALC-SCNC: 4 MMOL/L — LOW (ref 5–17)
ANION GAP SERPL CALC-SCNC: 5 MMOL/L — SIGNIFICANT CHANGE UP (ref 5–17)
ANISOCYTOSIS BLD QL: SLIGHT — SIGNIFICANT CHANGE UP
APTT BLD: 53.2 SEC — HIGH (ref 27.5–35.5)
APTT BLD: 64.7 SEC — HIGH (ref 27.5–35.5)
AST SERPL-CCNC: 16 U/L — SIGNIFICANT CHANGE UP (ref 15–37)
BASO STIPL BLD QL SMEAR: PRESENT — SIGNIFICANT CHANGE UP
BASOPHILS # BLD AUTO: 0 K/UL — SIGNIFICANT CHANGE UP (ref 0–0.2)
BASOPHILS NFR BLD AUTO: 0 % — SIGNIFICANT CHANGE UP (ref 0–2)
BILIRUB SERPL-MCNC: 0.3 MG/DL — SIGNIFICANT CHANGE UP (ref 0.2–1.2)
BUN SERPL-MCNC: 38 MG/DL — HIGH (ref 7–23)
BUN SERPL-MCNC: 38 MG/DL — HIGH (ref 7–23)
CALCIUM SERPL-MCNC: 8.1 MG/DL — LOW (ref 8.5–10.1)
CALCIUM SERPL-MCNC: 8.3 MG/DL — LOW (ref 8.5–10.1)
CHLORIDE SERPL-SCNC: 92 MMOL/L — LOW (ref 96–108)
CHLORIDE SERPL-SCNC: 93 MMOL/L — LOW (ref 96–108)
CO2 SERPL-SCNC: 28 MMOL/L — SIGNIFICANT CHANGE UP (ref 22–31)
CO2 SERPL-SCNC: 29 MMOL/L — SIGNIFICANT CHANGE UP (ref 22–31)
CREAT SERPL-MCNC: 1.17 MG/DL — SIGNIFICANT CHANGE UP (ref 0.5–1.3)
CREAT SERPL-MCNC: 1.21 MG/DL — SIGNIFICANT CHANGE UP (ref 0.5–1.3)
CULTURE RESULTS: SIGNIFICANT CHANGE UP
DACRYOCYTES BLD QL SMEAR: SLIGHT — SIGNIFICANT CHANGE UP
EGFR: 48 ML/MIN/1.73M2 — LOW
EGFR: 50 ML/MIN/1.73M2 — LOW
EOSINOPHIL # BLD AUTO: 1.9 K/UL — HIGH (ref 0–0.5)
EOSINOPHIL NFR BLD AUTO: 3 % — SIGNIFICANT CHANGE UP (ref 0–6)
ESTIMATED AVERAGE GLUCOSE: 108 MG/DL — SIGNIFICANT CHANGE UP (ref 68–114)
GIANT PLATELETS BLD QL SMEAR: PRESENT — SIGNIFICANT CHANGE UP
GLUCOSE SERPL-MCNC: 124 MG/DL — HIGH (ref 70–99)
GLUCOSE SERPL-MCNC: 138 MG/DL — HIGH (ref 70–99)
HCT VFR BLD CALC: 23.1 % — LOW (ref 34.5–45)
HCT VFR BLD CALC: 25.1 % — LOW (ref 34.5–45)
HCV AB S/CO SERPL IA: 0.17 S/CO — SIGNIFICANT CHANGE UP (ref 0–0.99)
HCV AB SERPL-IMP: SIGNIFICANT CHANGE UP
HGB BLD-MCNC: 7.3 G/DL — LOW (ref 11.5–15.5)
HGB BLD-MCNC: 7.9 G/DL — LOW (ref 11.5–15.5)
LYMPHOCYTES # BLD AUTO: 3.16 K/UL — SIGNIFICANT CHANGE UP (ref 1–3.3)
LYMPHOCYTES # BLD AUTO: 5 % — LOW (ref 13–44)
MACROCYTES BLD QL: SIGNIFICANT CHANGE UP
MANUAL SMEAR VERIFICATION: SIGNIFICANT CHANGE UP
MCHC RBC-ENTMCNC: 28.5 PG — SIGNIFICANT CHANGE UP (ref 27–34)
MCHC RBC-ENTMCNC: 28.5 PG — SIGNIFICANT CHANGE UP (ref 27–34)
MCHC RBC-ENTMCNC: 31.5 GM/DL — LOW (ref 32–36)
MCHC RBC-ENTMCNC: 31.6 GM/DL — LOW (ref 32–36)
MCV RBC AUTO: 90.2 FL — SIGNIFICANT CHANGE UP (ref 80–100)
MCV RBC AUTO: 90.6 FL — SIGNIFICANT CHANGE UP (ref 80–100)
MICROCYTES BLD QL: SLIGHT — SIGNIFICANT CHANGE UP
MONOCYTES # BLD AUTO: 3.16 K/UL — HIGH (ref 0–0.9)
MONOCYTES NFR BLD AUTO: 5 % — SIGNIFICANT CHANGE UP (ref 2–14)
NEUTROPHILS # BLD AUTO: 55.01 K/UL — HIGH (ref 1.8–7.4)
NEUTROPHILS NFR BLD AUTO: 73 % — SIGNIFICANT CHANGE UP (ref 43–77)
NEUTS BAND # BLD: 14 % — HIGH (ref 0–8)
NRBC # BLD: 0 /100 — SIGNIFICANT CHANGE UP (ref 0–0)
NRBC # BLD: SIGNIFICANT CHANGE UP /100 WBCS (ref 0–0)
OSMOLALITY SERPL: 277 MOSMOL/KG — LOW (ref 280–301)
PLAT MORPH BLD: ABNORMAL
PLATELET # BLD AUTO: 1166 K/UL — CRITICAL HIGH (ref 150–400)
PLATELET # BLD AUTO: 1346 K/UL — CRITICAL HIGH (ref 150–400)
POIKILOCYTOSIS BLD QL AUTO: SLIGHT — SIGNIFICANT CHANGE UP
POLYCHROMASIA BLD QL SMEAR: SLIGHT — SIGNIFICANT CHANGE UP
POTASSIUM SERPL-MCNC: 4.3 MMOL/L — SIGNIFICANT CHANGE UP (ref 3.5–5.3)
POTASSIUM SERPL-MCNC: 4.5 MMOL/L — SIGNIFICANT CHANGE UP (ref 3.5–5.3)
POTASSIUM SERPL-SCNC: 4.3 MMOL/L — SIGNIFICANT CHANGE UP (ref 3.5–5.3)
POTASSIUM SERPL-SCNC: 4.5 MMOL/L — SIGNIFICANT CHANGE UP (ref 3.5–5.3)
PROT SERPL-MCNC: 6.9 GM/DL — SIGNIFICANT CHANGE UP (ref 6–8.3)
RBC # BLD: 2.56 M/UL — LOW (ref 3.8–5.2)
RBC # BLD: 2.77 M/UL — LOW (ref 3.8–5.2)
RBC # FLD: 21.3 % — HIGH (ref 10.3–14.5)
RBC # FLD: 21.5 % — HIGH (ref 10.3–14.5)
RBC BLD AUTO: ABNORMAL
SODIUM SERPL-SCNC: 125 MMOL/L — LOW (ref 135–145)
SODIUM SERPL-SCNC: 126 MMOL/L — LOW (ref 135–145)
SPECIMEN SOURCE: SIGNIFICANT CHANGE UP
TSH SERPL-MCNC: 3.96 UU/ML — SIGNIFICANT CHANGE UP (ref 0.34–4.82)
WBC # BLD: 63.23 K/UL — CRITICAL HIGH (ref 3.8–10.5)
WBC # BLD: 74.66 K/UL — CRITICAL HIGH (ref 3.8–10.5)
WBC # FLD AUTO: 63.23 K/UL — CRITICAL HIGH (ref 3.8–10.5)
WBC # FLD AUTO: 74.66 K/UL — CRITICAL HIGH (ref 3.8–10.5)

## 2023-03-16 PROCEDURE — 99233 SBSQ HOSP IP/OBS HIGH 50: CPT

## 2023-03-16 PROCEDURE — 99232 SBSQ HOSP IP/OBS MODERATE 35: CPT

## 2023-03-16 RX ORDER — METOPROLOL TARTRATE 50 MG
25 TABLET ORAL EVERY 12 HOURS
Refills: 0 | Status: COMPLETED | OUTPATIENT
Start: 2023-03-16 | End: 2023-03-17

## 2023-03-16 RX ORDER — RIVAROXABAN 15 MG-20MG
15 KIT ORAL
Refills: 0 | Status: DISCONTINUED | OUTPATIENT
Start: 2023-03-16 | End: 2023-03-19

## 2023-03-16 RX ORDER — TAMSULOSIN HYDROCHLORIDE 0.4 MG/1
0.4 CAPSULE ORAL AT BEDTIME
Refills: 0 | Status: DISCONTINUED | OUTPATIENT
Start: 2023-03-16 | End: 2023-03-19

## 2023-03-16 RX ORDER — MORPHINE SULFATE 50 MG/1
4 CAPSULE, EXTENDED RELEASE ORAL EVERY 4 HOURS
Refills: 0 | Status: DISCONTINUED | OUTPATIENT
Start: 2023-03-16 | End: 2023-03-19

## 2023-03-16 RX ADMIN — Medication 30 MILLIGRAM(S): at 04:59

## 2023-03-16 RX ADMIN — Medication 30 MILLIGRAM(S): at 00:33

## 2023-03-16 RX ADMIN — ATORVASTATIN CALCIUM 10 MILLIGRAM(S): 80 TABLET, FILM COATED ORAL at 21:20

## 2023-03-16 RX ADMIN — MORPHINE SULFATE 4 MILLIGRAM(S): 50 CAPSULE, EXTENDED RELEASE ORAL at 21:45

## 2023-03-16 RX ADMIN — Medication 1000 MILLIGRAM(S): at 21:17

## 2023-03-16 RX ADMIN — PANTOPRAZOLE SODIUM 40 MILLIGRAM(S): 20 TABLET, DELAYED RELEASE ORAL at 04:59

## 2023-03-16 RX ADMIN — Medication 30 MILLIGRAM(S): at 17:24

## 2023-03-16 RX ADMIN — RIVAROXABAN 15 MILLIGRAM(S): KIT at 17:24

## 2023-03-16 RX ADMIN — Medication 1000 MILLIGRAM(S): at 13:10

## 2023-03-16 RX ADMIN — MORPHINE SULFATE 2 MILLIGRAM(S): 50 CAPSULE, EXTENDED RELEASE ORAL at 07:06

## 2023-03-16 RX ADMIN — HEPARIN SODIUM 1400 UNIT(S)/HR: 5000 INJECTION INTRAVENOUS; SUBCUTANEOUS at 10:53

## 2023-03-16 RX ADMIN — POLYETHYLENE GLYCOL 3350 17 GRAM(S): 17 POWDER, FOR SOLUTION ORAL at 10:34

## 2023-03-16 RX ADMIN — Medication 30 MILLIGRAM(S): at 12:59

## 2023-03-16 RX ADMIN — HEPARIN SODIUM 1300 UNIT(S)/HR: 5000 INJECTION INTRAVENOUS; SUBCUTANEOUS at 09:00

## 2023-03-16 RX ADMIN — Medication 1000 MILLIGRAM(S): at 04:57

## 2023-03-16 RX ADMIN — HEPARIN SODIUM 1300 UNIT(S)/HR: 5000 INJECTION INTRAVENOUS; SUBCUTANEOUS at 07:37

## 2023-03-16 RX ADMIN — MORPHINE SULFATE 4 MILLIGRAM(S): 50 CAPSULE, EXTENDED RELEASE ORAL at 21:22

## 2023-03-16 RX ADMIN — MORPHINE SULFATE 4 MILLIGRAM(S): 50 CAPSULE, EXTENDED RELEASE ORAL at 11:12

## 2023-03-16 RX ADMIN — Medication 1000 MILLIGRAM(S): at 05:27

## 2023-03-16 RX ADMIN — Medication 25 MILLIGRAM(S): at 21:19

## 2023-03-16 RX ADMIN — Medication 1000 MILLIGRAM(S): at 14:17

## 2023-03-16 RX ADMIN — TAMSULOSIN HYDROCHLORIDE 0.4 MILLIGRAM(S): 0.4 CAPSULE ORAL at 21:19

## 2023-03-16 RX ADMIN — HEPARIN SODIUM 1300 UNIT(S)/HR: 5000 INJECTION INTRAVENOUS; SUBCUTANEOUS at 02:07

## 2023-03-16 RX ADMIN — Medication 1000 MILLIGRAM(S): at 21:45

## 2023-03-16 RX ADMIN — OXYCODONE HYDROCHLORIDE 10 MILLIGRAM(S): 5 TABLET ORAL at 09:28

## 2023-03-16 RX ADMIN — MORPHINE SULFATE 4 MILLIGRAM(S): 50 CAPSULE, EXTENDED RELEASE ORAL at 11:30

## 2023-03-16 RX ADMIN — CEFTRIAXONE 1000 MILLIGRAM(S): 500 INJECTION, POWDER, FOR SOLUTION INTRAMUSCULAR; INTRAVENOUS at 05:07

## 2023-03-16 RX ADMIN — Medication 40 MILLIGRAM(S): at 10:33

## 2023-03-16 RX ADMIN — Medication 100 MILLIGRAM(S): at 10:33

## 2023-03-16 NOTE — PROGRESS NOTE ADULT - PROBLEM SELECTOR PLAN 1
According to family AF is not a new diagnosis - reviewed ECGs from earlier this month from [facility] that revealed AF. rate improved on cardizem 30 gm po q 6 hrs, added lopressor 25 mg po q12 hrs/Dr. Aquino d/w pt's cardiologist - Dr. Osorio (([phone]), CHADsVASc=3 - on hep gtt - will be changed to xarelto

## 2023-03-16 NOTE — CONSULT NOTE ADULT - ASSESSMENT
72 yo female with hx of Afib, pericardial effusion myelofibrosis, and recent pna presenting with back pains since fall days earlier   On admission found to have right hydro due to right ureteral stone, acute L3 compression fracture     Hyponatremia -  s.p IVF NS and IV lasix on board   - hypervolemic vs siadh ( euvolemia ) sec to pains    - pain control    - may continue intermittent lasix for now and trend labs   - limit fluid intake to 1 liter per day    - repeat labs later today    - osm, uric acid will not be accurate while on lasix   - if Na continues to drop may require nacl tablets to stem the drop     await labs     d.w Dr Barrow     * pt seen earlier, note now

## 2023-03-16 NOTE — PHYSICAL THERAPY INITIAL EVALUATION ADULT - DIAGNOSIS, PT EVAL
Atrial fibrillation. Pericardial effusion, Acute mild superior endplate compression fracture of L3 with minimal bony retropulsion

## 2023-03-16 NOTE — CONSULT NOTE ADULT - SUBJECTIVE AND OBJECTIVE BOX
71 year old woman with a history of atrial fibrillation (treated with metoprolol and aspirin), small-medium pericardial effusion, myelofibrosis, and recent pneumonia who presented to the ER with complaints of severe R flank and / posterior chest wall pain x 1 day after falling on her buttocks several days earlier.    A. Pt is on O2 for recent diagnosis for PNA 10-12 days for which she is on cefuroxime and  was in [facility], received recent blood transfusion for anemia recently. Pt reports her flank pain feels similar to when she was admitted for PNA recently. Pt cannot have Ibuprofen due to her myelofibrosis.  Pt with recent fall 3 days ago worsening R flank and R posterior chest wall pain. Recent hospitalization for PNA with symptoms improving.,  Massive hepatosplenomegaly with 2 wegde shaped infarcts to liver. alos found was mild right ureteral stone with mild right hydro/ An acute L3 compression fracture was identified. small 5mm RU nodule.   Renal eval called for hyponatremia from Na 130 to 125 today      today pt is c.o left sided pains worse than previous    denies nausea, vomiting     pt recieved 2 liters of NS 3 .14 and then has been on IV lasix since yest    pt denies sob but states she feels swollen all over        PAST MEDICAL & SURGICAL HISTORY:  Myelofibrosis  PNA (pneumonia)  nemia    Home Medications:  Aspirin Enteric Coated 81 mg oral delayed release tablet: 1 tab(s) orally once a day (15 Mar 2023 00:06)  atorvastatin 10 mg oral tablet: 1 tab(s) orally once a day (15 Mar 2023 00:06)  cefuroxime 500 mg oral tablet: 1 tab(s) orally every 12 hours for 10 days  ***course not complete, 2-3 days remaining*** (15 Mar 2023 00:06)  cyanocobalamin 1000 mcg/mL injectable solution: injectable once a month  ***past due, scheduled for tomorrow at Hematologist*** (15 Mar 2023 00:06)  fedratinib 100 mg oral capsule: 2 cap(s) orally once a day  ***pt  can provide med from home*** (15 Mar 2023 00:06)  Metoprolol Succinate ER 50 mg oral tablet, extended release: 1 tab(s) orally once a day (15 Mar 2023 00:06)  omeprazole 20 mg oral delayed release capsule: 1 cap(s) orally 2 times a day, As Needed (15 Mar 2023 00:06)  thiamine 100 mg oral tablet: 1 tab(s) orally once a day with Inrebic (15 Mar 2023 00:06)      MEDICATIONS  (STANDING):  acetaminophen     Tablet .. 1000 milliGRAM(s) Oral every 8 hours  atorvastatin 10 milliGRAM(s) Oral at bedtime  cefTRIAXone Injectable. 1000 milliGRAM(s) IV Push every 24 hours  diltiazem    Tablet 30 milliGRAM(s) Oral every 6 hours  heparin  Infusion.  Unit(s)/Hr (11 mL/Hr) IV Continuous <Continuous>  influenza  Vaccine (HIGH DOSE) 0.7 milliLiter(s) IntraMuscular once  Inrebic (fedratinib) 2 Capsule(s) 2 Capsule(s) Oral daily  metoprolol tartrate 25 milliGRAM(s) Oral every 12 hours  naloxone Injectable 0.4 milliGRAM(s) IV Push once  pantoprazole    Tablet 40 milliGRAM(s) Oral before breakfast  polyethylene glycol 3350 17 Gram(s) Oral daily  senna 2 Tablet(s) Oral at bedtime  tamsulosin 0.4 milliGRAM(s) Oral at bedtime  thiamine 100 milliGRAM(s) Oral daily      Allergies    No Known Allergies    Intolerances    SOCIAL HISTORY:  Denies ETOh,Smoking,     FAMILY HISTORY:      REVIEW OF SYSTEMS:    CONSTITUTIONAL: No weakness, fevers or chills  EYES/ENT: No visual changes;  No vertigo or throat pain   NECK: No pain or stiffness  RESPIRATORY: No cough, wheezing, hemoptysis;   CARDIOVASCULAR: No chest pain or palpitations  GASTROINTESTINAL:  left sided flank pains   GENITOURINARY: No dysuria, frequency or hematuria  NEUROLOGICAL: No numbness or weakness  SKIN: No itching, burning, rashes, or lesions   All other review of systems is negative unless indicated above.    VITAL:  Vital Signs Last 24 Hrs  T(C): 36.5 (16 Mar 2023 07:38), Max: 36.5 (15 Mar 2023 20:40)  T(F): 97.7 (16 Mar 2023 07:38), Max: 97.7 (15 Mar 2023 20:40)  HR: 75 (16 Mar 2023 07:38) (75 - 106)  BP: 117/64 (16 Mar 2023 07:38) (100/57 - 117/64)  BP(mean): --  RR: 18 (16 Mar 2023 07:38) (18 - 18)  SpO2: 99% (16 Mar 2023 07:38) (98% - 99%)    Parameters below as of 16 Mar 2023 07:38  Patient On (Oxygen Delivery Method): nasal cannula      PHYSICAL EXAM:    Constitutional: NAD  HEENT:, EOMI,  MMM  Neck: No LAD, No JVD  Respiratory: CTAB  Cardiovascular: S1 and S2  Gastrointestinal: left sided CVAT, no gaurd or rebound   abd distended w palplable hepatosplenomegaly   Extremities: 1+  peripheral edema  Neurological: A/O x 3, no focal deficits  : No Linn  Skin: No rashes  Access: Not applicable    LABS:                        7.3    63.23 )-----------( 1166     ( 16 Mar 2023 07:03 )             23.1       125    |  93     |  38     ----------------------------<  124       16 Mar 2023 07:03  4.5     |  28     |  1.17     126    |  92     |  34     ----------------------------<  123       15 Mar 2023 10:38  4.8     |  29     |  1.03     130    |  93     |  44     ----------------------------<  120       14 Mar 2023 20:54  5.9     |  31     |  1.17     Ca    8.1        16 Mar 2023 07:03  Ca    8.1        15 Mar 2023 10:38        TPro  6.9    /  Alb  2.7    /  TBili  0.3    /        16 Mar 2023 07:03  DBili  x      /  AST  16     /  ALT  13     /  AlkPhos  143      TPro  7.0    /  Alb  2.8    /  TBili  0.3    /        15 Mar 2023 10:38  DBili  x      /  AST  19     /  ALT  17     /  AlkPhos  154          Ca    8.1<L>      16 Mar 2023 07:03    TPro  6.9  /  Alb  2.7<L>  /  TBili  0.3  /  DBili  x   /  AST  16  /  ALT  13  /  AlkPhos  143<H>  03-16      Urine Studies:  Urinalysis Basic - ( 14 Mar 2023 20:47 )    Color: Yellow / Appearance: Clear / S.010 / pH: x  Gluc: x / Ketone: Negative  / Bili: Negative / Urobili: Negative   Blood: x / Protein: 30 mg/dL / Nitrite: Negative   Leuk Esterase: Negative / RBC: 0-2 /HPF / WBC 11-25 /HPF   Sq Epi: x / Non Sq Epi: x / Bacteria: Few    RADIOLOGY & ADDITIONAL STUDIES:

## 2023-03-16 NOTE — PHYSICAL THERAPY INITIAL EVALUATION ADULT - PERTINENT HX OF CURRENT PROBLEM, REHAB EVAL
71 year old woman with a history of atrial fibrillation (treated with metoprolol and aspirin), small-medium pericardial effusion, myelofibrosis, and recent pneumonia who presented to the ER with complaints of severe R flank and / posterior chest wall pain x 1 day after falling on her buttocks several days earlier.  Pt is on O2 for recent diagnosis for PNA 10-12 days for which she is on cefuroxime and  was in [facility], received recent blood transfusion for anemia recently, Pt with recent fall 3 days ago worsening R flank and R posterior chest wall pain. Recent hospitalization for PNA with symptoms improving.,  Massive hepatosplenomegaly with 2 wegde shaped infarcts to liver. alos found was mild right ureteral stone with mild right hydro/ An acute L3 compression fracture was identified.

## 2023-03-16 NOTE — PROGRESS NOTE ADULT - SUBJECTIVE AND OBJECTIVE BOX
HOSPITALIST ATTENDING PROGRESS NOTE    Chart and meds reviewed.  Patient seen and examined.    CC: fall w flank pain    Subjective: Reports back and flank pain, some groin pain. Denies CP, SOB.     All other systems reviewed and found to be negative with the exception of what has been described above.    MEDICATIONS  (STANDING):  acetaminophen     Tablet .. 1000 milliGRAM(s) Oral every 8 hours  atorvastatin 10 milliGRAM(s) Oral at bedtime  cefTRIAXone Injectable. 1000 milliGRAM(s) IV Push every 24 hours  diltiazem    Tablet 30 milliGRAM(s) Oral every 6 hours  influenza  Vaccine (HIGH DOSE) 0.7 milliLiter(s) IntraMuscular once  Inrebic (fedratinib) 2 Capsule(s) 2 Capsule(s) Oral daily  metoprolol tartrate 25 milliGRAM(s) Oral every 12 hours  naloxone Injectable 0.4 milliGRAM(s) IV Push once  pantoprazole    Tablet 40 milliGRAM(s) Oral before breakfast  polyethylene glycol 3350 17 Gram(s) Oral daily  rivaroxaban 15 milliGRAM(s) Oral with dinner  senna 2 Tablet(s) Oral at bedtime  tamsulosin 0.4 milliGRAM(s) Oral at bedtime  thiamine 100 milliGRAM(s) Oral daily    MEDICATIONS  (PRN):  aluminum hydroxide/magnesium hydroxide/simethicone Suspension 30 milliLiter(s) Oral every 4 hours PRN Dyspepsia  bisacodyl 5 milliGRAM(s) Oral daily PRN Constipation  melatonin 3 milliGRAM(s) Oral at bedtime PRN Insomnia  morphine  - Injectable 4 milliGRAM(s) IV Push every 4 hours PRN Severe Pain (7 - 10)  ondansetron Injectable 4 milliGRAM(s) IV Push every 8 hours PRN Nausea and/or Vomiting  oxyCODONE    IR 5 milliGRAM(s) Oral every 4 hours PRN Mild Pain (1 - 3)  oxyCODONE    IR 10 milliGRAM(s) Oral every 4 hours PRN Moderate Pain (4 - 6)      VITALS:  T(F): 97.5 (03-16-23 @ 20:17), Max: 97.7 (03-16-23 @ 07:38)  HR: 90 (03-16-23 @ 20:17) (75 - 90)  BP: 111/54 (03-16-23 @ 20:17) (107/53 - 117/64)  RR: 18 (03-16-23 @ 20:17) (18 - 18)  SpO2: 92% (03-16-23 @ 20:17) (92% - 99%)  Wt(kg): --    I&O's Summary      CAPILLARY BLOOD GLUCOSE          PHYSICAL EXAM:  Gen: NAD  HEENT:  pupils equal and reactive, EOMI, no oropharyngeal lesions, erythema, exudates, oral thrush  NECK:   supple, no carotid bruits, no palpable lymph nodes, no thyromegaly  CV:  +S1, +S2, regular, no murmurs or rubs  RESP:   lungs clear to auscultation bilaterally, no wheezing, rales, rhonchi, good air entry bilaterally  BREAST:  not examined  GI:  abdomen soft, non-tender, non-distended, normal BS, no bruits, no abdominal masses, no palpable masses  RECTAL:  not examined  :  not examined  MSK:   normal muscle tone, no atrophy, no rigidity, no contractions  EXT:  no clubbing, no cyanosis, no edema, no calf pain, swelling or erythema  VASCULAR:  pulses equal and symmetric in the upper and lower extremities  NEURO:  AAOX3, no focal neurological deficits, follows all commands, able to move extremities spontaneously  SKIN:  no ulcers, lesions or rashes    LABS:                            7.9    74.66 )-----------( 1346     ( 16 Mar 2023 15:18 )             25.1     03-16    126<L>  |  92<L>  |  38<H>  ----------------------------<  138<H>  4.3   |  29  |  1.21    Ca    8.3<L>      16 Mar 2023 15:18    TPro  6.9  /  Alb  2.7<L>  /  TBili  0.3  /  DBili  x   /  AST  16  /  ALT  13  /  AlkPhos  143<H>  03-16        LIVER FUNCTIONS - ( 16 Mar 2023 07:03 )  Alb: 2.7 g/dL / Pro: 6.9 gm/dL / ALK PHOS: 143 U/L / ALT: 13 U/L / AST: 16 U/L / GGT: x           PT/INR - ( 15 Mar 2023 10:38 )   PT: 12.9 sec;   INR: 1.11 ratio         PTT - ( 16 Mar 2023 07:57 )  PTT:53.2 sec    CULTURES:  UCx >3org, contam    Additional results/Imaging, I have personally reviewed:  CTH, CT cspine 3/11/23: Head CT: No CT evidence of acute intracranial hemorrhage. C-spine CT:  No acute fracture. Extensive bilateral groundglass densities in the partially visualized lung apices. Correlate clinically. Additional evaluation may be indicated.    CT L spine 3/11/23: No evidence of an acute lumbar spine fracture. Scoliosis with multilevel degenerative changes. Intra-abdominal findings as described. Correlate with abdominal CT    CT c/a/p w iv contrast 3/14/23: Confluent pulmonary groundglass opacities with peripheral sparing as well as bilateral pleural effusions and small to moderate pericardial effusion in the setting of cardiomegaly. Findings may represent degenerative pulmonary edema. Differential for the pulmonary opacities is inflammatory infectious etiologies. Massive hepatosplenomegaly. 2 wedge-shaped foci of hypoattenuation in the spleen for which the differential includes age indeterminant infarct and laceration. Infarct is favored given the lack of perisplenic hemorrhage. Distal right ureteral stone with mild right hydronephrosis. Acute mild superior endplate compression fracture of L3 with minimal bony retropulsion. Incidental note of a 5 mm right upper lobe pulmonary nodule. Fleischner society recommendations for incidental pulmonary nodule follow-up: >4-6mm: Non-smoker: 12 month follow-up chest CT recommended and if unchanged, no further follow-up necessary. Smoker: Initial follow-up chest CT at 6-12 months then at 18-24 months if no change.    Echo 3/15/23: The aortic valve is well visualized, appears mildly calcified. Valve opening seems to be normal. The ascending aorta appears to be mildly dilated. The left atrium appears moderately dilated. The left ventricle is normal in size, wall thickness, wall motion and contractility. Estimated left ventricular ejection fraction is 60 %. IVC is dilated and not collapsing with inspiration. Mild mitral annular calcification is present. There is thickening of both mitral valve leaflets. The leaflet opening is normal. Mild (1+) mitral regurgitation is present. A small pericardial effusion is present. Pleural effusion - is present. Mild to moderate pulmonic valvular regurgitation (1+) is present. The right atrium appears mildly dilated. Normal appearing right ventricle structure and function. Moderate (2+) tricuspid valve regurgitation is present.    Telemetry, personally reviewed:  3/16/23: Afib

## 2023-03-16 NOTE — PROGRESS NOTE ADULT - SUBJECTIVE AND OBJECTIVE BOX
72 year old female seen in room today sitting upright in pain. Pt c/o right sided lower back pain after fall on Saturday. She states she fell while going home from the hospital. She has right sided lower back pain since the fall. She states movement makes the pain worse. Pain is alleviated with laying flat and immobility. Pt denies lower extremities pain, numbness, and weakness. Pt denies changes in bowel and bladder habits.     PE  Gen appearance: NAD  motor strength: 5/5 of b/l quads, b/l HF, b/l gastrocs, b/l ant tibs, b/l EHL  Sensation: intact to light touch bilat lower ext  Tenderness to palpation of the right paraspinal region of the lumbar spine.   No ecchymosis or swelling noted.    CT abd: Confluent pulmonary groundglass opacities with peripheral sparing as well   as bilateral pleural effusions and small to moderate pericardial effusion   in the setting of cardiomegaly. Findings may represent degenerative   pulmonary edema. Differential for the pulmonary opacities is inflammatory   infectious etiologies.    Massive hepatosplenomegaly. 2 wedge-shaped foci of hypoattenuation in the   spleen for which the differential includes age indeterminant infarct and   laceration. Infarct is favored given the lack of perisplenic hemorrhage.    Distal right ureteral stone with mild right hydronephrosis    Acute mild superior endplate compression fracture of L3 with minimal bony   retropulsion.    Incidental note of a 5 mm right upper lobe pulmonary nodule. Fleischner   society recommendations for incidental pulmonary nodule follow-up:    Plan Acute mild superior endplate compression fracture of L3 with minimal bony retropulsion  - LSO brace recommended at this time.  - No acute surgical indications.   - Continue analgesics.  - Distal right ureteral stone with mild right hydronephrosis eval per MED. Hepatosplenomegaly, nodule of the right upper lobe, bilateral pleural and pericardial effusion on CT scan eval per MED.   - All questions answered.  - Discussed case with Dr. Batista.

## 2023-03-16 NOTE — PHYSICAL THERAPY INITIAL EVALUATION ADULT - GENERAL OBSERVATIONS, REHAB EVAL
Pt was found lying in bed on tele,IV Heparin, 2 lits of o2, spouse in room, Pt c/o back pain, willing to participate in PT, awaiting LSO, low H/H: 7.3/23.1

## 2023-03-16 NOTE — PROGRESS NOTE ADULT - SUBJECTIVE AND OBJECTIVE BOX
CHIEF COMPLAINT: Patient is a 72y old  Female who presents with a chief complaint of fall/R flank pain (15 Mar 2023 09:08)    HPI:  71 year old woman with a history of atrial fibrillation (treated with metoprolol and aspirin), small-medium pericardial effusion, myelofibrosis, and recent pneumonia who presented to the ER with complaints of severe R flank and / posterior chest wall pain x 1 day after falling on her buttocks several days earlier. Cardiology was called to assist in the management of her atrial fibrillation.  I spoke to the patient's family at the bedside - report AF that was diagnosed ~ 2 years ago (not previously anticoagulated) -- sees Dr Osorio in [place].  Mrs. Moncada has no cardiac complaints -- specifically, no chest discomfort, dyspnea, or palpitations.    3/16/23: no complaints, Tele: Afib with RVR - [id number], BB added    MEDICATIONS  (STANDING):  acetaminophen     Tablet .. 1000 milliGRAM(s) Oral every 8 hours  atorvastatin 10 milliGRAM(s) Oral at bedtime  cefTRIAXone Injectable. 1000 milliGRAM(s) IV Push every 24 hours  diltiazem    Tablet 30 milliGRAM(s) Oral every 6 hours  heparin  Infusion.  Unit(s)/Hr (11 mL/Hr) IV Continuous <Continuous>  influenza  Vaccine (HIGH DOSE) 0.7 milliLiter(s) IntraMuscular once  Inrebic (fedratinib) 4 Capsule(s) 4 Capsule(s) Oral daily  metoprolol tartrate 25 milliGRAM(s) Oral every 12 hours  naloxone Injectable 0.4 milliGRAM(s) IV Push once  pantoprazole    Tablet 40 milliGRAM(s) Oral before breakfast  polyethylene glycol 3350 17 Gram(s) Oral daily  senna 2 Tablet(s) Oral at bedtime  tamsulosin 0.4 milliGRAM(s) Oral at bedtime  thiamine 100 milliGRAM(s) Oral daily      MEDICATIONS  (PRN):  aluminum hydroxide/magnesium hydroxide/simethicone Suspension 30 milliLiter(s) Oral every 4 hours PRN Dyspepsia  bisacodyl 5 milliGRAM(s) Oral daily PRN Constipation  heparin   Injectable 4500 Unit(s) IV Push every 6 hours PRN For aPTT less than 40  heparin   Injectable 2000 Unit(s) IV Push every 6 hours PRN For aPTT between 40 - 57  melatonin 3 milliGRAM(s) Oral at bedtime PRN Insomnia  morphine  - Injectable 2 milliGRAM(s) IV Push every 4 hours PRN Severe Pain (7 - 10)  ondansetron Injectable 4 milliGRAM(s) IV Push every 8 hours PRN Nausea and/or Vomiting  oxyCODONE    IR 5 milliGRAM(s) Oral every 4 hours PRN Moderate Pain (4 - 6)  oxyCODONE    IR 10 milliGRAM(s) Oral every 4 hours PRN Severe Pain (7 - 10)    Vital Signs Last 24 Hrs  T(C): 36.5 (16 Mar 2023 07:38), Max: 36.5 (15 Mar 2023 20:40)  T(F): 97.7 (16 Mar 2023 07:38), Max: 97.7 (15 Mar 2023 20:40)  HR: 75 (16 Mar 2023 07:38) (75 - 106)  BP: 117/64 (16 Mar 2023 07:38) (100/57 - 117/64)  BP(mean): --  RR: 18 (16 Mar 2023 07:38) (18 - 18)  SpO2: 99% (16 Mar 2023 07:38) (98% - 99%)    Parameters below as of 16 Mar 2023 07:38  Patient On (Oxygen Delivery Method): nasal cannula      PHYSICAL EXAM:  Constitutional: NAD, awake and alert  HEENT:  EOMI,  Pupils round, No oral cyanosis.  Pulmonary: Non-labored, breath sounds are clear bilaterally, No wheezing, rales or rhonchi  Cardiovascular: Irregular, mild tachycardia  Gastrointestinal: Bowel Sounds present, soft, nontender.   Lymph: No edema.   Neurological: Alert, no focal deficits  Skin: No rashes.  Psych:  Mood & affect appropriate    LABS:                        7.3    63.23 )-----------( 1166     ( 16 Mar 2023 07:03 )             23.1     03-16    125<L>  |  93<L>  |  38<H>  ----------------------------<  124<H>  4.5   |  28  |  1.17    Ca    8.1<L>      16 Mar 2023 07:03    TPro  6.9  /  Alb  2.7<L>  /  TBili  0.3  /  DBili  x   /  AST  16  /  ALT  13  /  AlkPhos  143<H>  03-16    - TroponinI hsT: <-7.24                7.9    67.22 )-----------( 1332     ( 15 Mar 2023 10:38 )             25.8             126    |  92     |  34     ----------------------------<  123    4.8     |  29     |  1.03     12 Lead ECG (03.14.23 @ 21:56):  Atrial fibrillation with rapid ventricular response  Right ventricular hypertrophy  Possible Anterolateral infarct (cited on or before 14-MAR-2023)  Abnormal ECG    CT Chest w/ IV Cont (03.14.23 @ 21:35):  Confluent pulmonary groundglass opacities with peripheral sparing as well as bilateral pleural effusions and small to moderate pericardial effusion in the setting of cardiomegaly. Findings may represent degenerative pulmonary edema. Differential for the pulmonary opacities is inflammatory infectious etiologies.  Massive hepatosplenomegaly. 2 wedge-shaped foci of hypoattenuation in the spleen for which the differential includes age indeterminant infarct and laceration. Infarct is favored given the lack of perisplenic hemorrhage.  Distal right ureteral stone with mild right hydronephrosis.  Acute mild superior endplate compression fracture of L3 with minimal bony retropulsion.  Incidental note of a 5 mm right upper lobe pulmonary nodule.     Tele: AF rate ~ 100 bpm    < from: TTE Echo Complete w/o Contrast w/ Doppler (03.15.23 @ 11:22) >   Impression     Summary     The aortic valve is well visualized, appears mildly calcified. Valve   opening seems to be normal.   The ascending aorta appears to be mildly dilated.   The left atrium appears moderately dilated.   The left ventricle is normal in size, wall thickness, wall motion and   contractility.   Estimated left ventricular ejection fraction is 60 %.   IVC is dilated and not collapsing with inspiration.   Mild mitral annular calcification is present.   There is thickening of both mitral valve leaflets. The leaflet opening is   normal.   Mild (1+) mitral regurgitation is present.   A small pericardial effusion is present.   Pleural effusion - is present.   Mild to moderate pulmonic valvular regurgitation (1+) is present.   The right atrium appears mildly dilated.   Normal appearing right ventricle structure and function.   Moderate (2+) tricuspid valve regurgitation is present.     Signature     ----------------------------------------------------------------   Electronically signed by Caroline Perez MD(Interpreting   physician) on 03/15/2023 05:55 PM    < end of copied text >             REASON FOR VISIT: AF    HPI:  71 year old woman with a history of atrial fibrillation (treated with metoprolol and aspirin), small-medium pericardial effusion, myelofibrosis, and recent pneumonia who presented to the ER with complaints of severe R flank and / posterior chest wall pain x 1 day after falling on her buttocks several days earlier. Cardiology was called to assist in the management of her atrial fibrillation.  I spoke to the patient's family at the bedside - report AF that was diagnosed ~ 2 years ago (not previously anticoagulated) -- sees Dr Osorio in [place].  Mrs. Moncada has no cardiac complaints -- specifically, no chest discomfort, dyspnea, or palpitations.    3/16/23: no complaints, Tele: Afib with RVR - , BB added    MEDICATIONS  (STANDING):  acetaminophen     Tablet .. 1000 milliGRAM(s) Oral every 8 hours  atorvastatin 10 milliGRAM(s) Oral at bedtime  cefTRIAXone Injectable. 1000 milliGRAM(s) IV Push every 24 hours  diltiazem    Tablet 30 milliGRAM(s) Oral every 6 hours  heparin  Infusion.  Unit(s)/Hr (11 mL/Hr) IV Continuous <Continuous>  influenza  Vaccine (HIGH DOSE) 0.7 milliLiter(s) IntraMuscular once  Inrebic (fedratinib) 4 Capsule(s) 4 Capsule(s) Oral daily  metoprolol tartrate 25 milliGRAM(s) Oral every 12 hours  naloxone Injectable 0.4 milliGRAM(s) IV Push once  pantoprazole    Tablet 40 milliGRAM(s) Oral before breakfast  polyethylene glycol 3350 17 Gram(s) Oral daily  senna 2 Tablet(s) Oral at bedtime  tamsulosin 0.4 milliGRAM(s) Oral at bedtime  thiamine 100 milliGRAM(s) Oral daily      MEDICATIONS  (PRN):  aluminum hydroxide/magnesium hydroxide/simethicone Suspension 30 milliLiter(s) Oral every 4 hours PRN Dyspepsia  bisacodyl 5 milliGRAM(s) Oral daily PRN Constipation  heparin   Injectable 4500 Unit(s) IV Push every 6 hours PRN For aPTT less than 40  heparin   Injectable 2000 Unit(s) IV Push every 6 hours PRN For aPTT between 40 - 57  melatonin 3 milliGRAM(s) Oral at bedtime PRN Insomnia  morphine  - Injectable 2 milliGRAM(s) IV Push every 4 hours PRN Severe Pain (7 - 10)  ondansetron Injectable 4 milliGRAM(s) IV Push every 8 hours PRN Nausea and/or Vomiting  oxyCODONE    IR 5 milliGRAM(s) Oral every 4 hours PRN Moderate Pain (4 - 6)  oxyCODONE    IR 10 milliGRAM(s) Oral every 4 hours PRN Severe Pain (7 - 10)    Vital Signs Last 24 Hrs  T(C): 36.5 (16 Mar 2023 07:38), Max: 36.5 (15 Mar 2023 20:40)  T(F): 97.7 (16 Mar 2023 07:38), Max: 97.7 (15 Mar 2023 20:40)  HR: 75 (16 Mar 2023 07:38) (75 - 106)  BP: 117/64 (16 Mar 2023 07:38) (100/57 - 117/64)  BP(mean): --  RR: 18 (16 Mar 2023 07:38) (18 - 18)  SpO2: 99% (16 Mar 2023 07:38) (98% - 99%)    Parameters below as of 16 Mar 2023 07:38  Patient On (Oxygen Delivery Method): nasal cannula      PHYSICAL EXAM:  Constitutional: NAD, awake and alert  HEENT:  EOMI,  Pupils round, No oral cyanosis.  Pulmonary: Non-labored, breath sounds are clear bilaterally, No wheezing, rales or rhonchi  Cardiovascular: Irregular, mild tachycardia  Gastrointestinal: Bowel Sounds present, soft, nontender.   Lymph: No edema.   Neurological: Alert, no focal deficits  Skin: No rashes.  Psych:  Mood & affect appropriate    LABS:                        7.3    63.23 )-----------( 1166     ( 16 Mar 2023 07:03 )             23.1     03-16    125<L>  |  93<L>  |  38<H>  ----------------------------<  124<H>  4.5   |  28  |  1.17    Ca    8.1<L>      16 Mar 2023 07:03    TPro  6.9  /  Alb  2.7<L>  /  TBili  0.3  /  DBili  x   /  AST  16  /  ALT  13  /  AlkPhos  143<H>  03-16    - TroponinI hsT: <-7.24                7.9    67.22 )-----------( 1332     ( 15 Mar 2023 10:38 )             25.8             126    |  92     |  34     ----------------------------<  123    4.8     |  29     |  1.03     12 Lead ECG (03.14.23 @ 21:56):  Atrial fibrillation with rapid ventricular response  Right ventricular hypertrophy  Possible Anterolateral infarct (cited on or before 14-MAR-2023)  Abnormal ECG    CT Chest w/ IV Cont (03.14.23 @ 21:35):  Confluent pulmonary groundglass opacities with peripheral sparing as well as bilateral pleural effusions and small to moderate pericardial effusion in the setting of cardiomegaly. Findings may represent degenerative pulmonary edema. Differential for the pulmonary opacities is inflammatory infectious etiologies.  Massive hepatosplenomegaly. 2 wedge-shaped foci of hypoattenuation in the spleen for which the differential includes age indeterminant infarct and laceration. Infarct is favored given the lack of perisplenic hemorrhage.  Distal right ureteral stone with mild right hydronephrosis.  Acute mild superior endplate compression fracture of L3 with minimal bony retropulsion.  Incidental note of a 5 mm right upper lobe pulmonary nodule.     Tele: AF rate ~ 100 bpm    < from: TTE Echo Complete w/o Contrast w/ Doppler (03.15.23 @ 11:22) >   Impression     Summary     The aortic valve is well visualized, appears mildly calcified. Valve   opening seems to be normal.   The ascending aorta appears to be mildly dilated.   The left atrium appears moderately dilated.   The left ventricle is normal in size, wall thickness, wall motion and   contractility.   Estimated left ventricular ejection fraction is 60 %.   IVC is dilated and not collapsing with inspiration.   Mild mitral annular calcification is present.   There is thickening of both mitral valve leaflets. The leaflet opening is   normal.   Mild (1+) mitral regurgitation is present.   A small pericardial effusion is present.   Pleural effusion - is present.   Mild to moderate pulmonic valvular regurgitation (1+) is present.   The right atrium appears mildly dilated.   Normal appearing right ventricle structure and function.   Moderate (2+) tricuspid valve regurgitation is present.     Signature     ----------------------------------------------------------------   Electronically signed by Caroline Perez MD(Interpreting   physician) on 03/15/2023 05:55 PM    < end of copied text >

## 2023-03-16 NOTE — PHYSICAL THERAPY INITIAL EVALUATION ADULT - PRECAUTIONS/LIMITATIONS, REHAB EVAL
on 2 lits of o2, LSO/cardiac precautions/fall precautions/oxygen therapy device and L/min/spinal precautions

## 2023-03-17 LAB
ADD ON TEST-SPECIMEN IN LAB: SIGNIFICANT CHANGE UP
ANION GAP SERPL CALC-SCNC: 3 MMOL/L — LOW (ref 5–17)
BUN SERPL-MCNC: 41 MG/DL — HIGH (ref 7–23)
CALCIUM SERPL-MCNC: 8.9 MG/DL — SIGNIFICANT CHANGE UP (ref 8.5–10.1)
CHLORIDE SERPL-SCNC: 92 MMOL/L — LOW (ref 96–108)
CO2 SERPL-SCNC: 32 MMOL/L — HIGH (ref 22–31)
CREAT SERPL-MCNC: 1.3 MG/DL — SIGNIFICANT CHANGE UP (ref 0.5–1.3)
EGFR: 44 ML/MIN/1.73M2 — LOW
GLUCOSE SERPL-MCNC: 130 MG/DL — HIGH (ref 70–99)
HCT VFR BLD CALC: 24.9 % — LOW (ref 34.5–45)
HGB BLD-MCNC: 7.7 G/DL — LOW (ref 11.5–15.5)
MAGNESIUM SERPL-MCNC: 2.5 MG/DL — SIGNIFICANT CHANGE UP (ref 1.6–2.6)
MCHC RBC-ENTMCNC: 28.7 PG — SIGNIFICANT CHANGE UP (ref 27–34)
MCHC RBC-ENTMCNC: 30.9 GM/DL — LOW (ref 32–36)
MCV RBC AUTO: 92.9 FL — SIGNIFICANT CHANGE UP (ref 80–100)
PLATELET # BLD AUTO: 1317 K/UL — CRITICAL HIGH (ref 150–400)
POTASSIUM SERPL-MCNC: 5.2 MMOL/L — SIGNIFICANT CHANGE UP (ref 3.5–5.3)
POTASSIUM SERPL-SCNC: 5.2 MMOL/L — SIGNIFICANT CHANGE UP (ref 3.5–5.3)
RBC # BLD: 2.68 M/UL — LOW (ref 3.8–5.2)
RBC # FLD: 21.4 % — HIGH (ref 10.3–14.5)
SODIUM SERPL-SCNC: 127 MMOL/L — LOW (ref 135–145)
WBC # BLD: 69.2 K/UL — CRITICAL HIGH (ref 3.8–10.5)
WBC # FLD AUTO: 69.2 K/UL — CRITICAL HIGH (ref 3.8–10.5)

## 2023-03-17 PROCEDURE — 99233 SBSQ HOSP IP/OBS HIGH 50: CPT

## 2023-03-17 PROCEDURE — 99232 SBSQ HOSP IP/OBS MODERATE 35: CPT

## 2023-03-17 RX ORDER — PREGABALIN 225 MG/1
1000 CAPSULE ORAL ONCE
Refills: 0 | Status: COMPLETED | OUTPATIENT
Start: 2023-03-17 | End: 2023-03-17

## 2023-03-17 RX ORDER — METOPROLOL TARTRATE 50 MG
50 TABLET ORAL DAILY
Refills: 0 | Status: DISCONTINUED | OUTPATIENT
Start: 2023-03-18 | End: 2023-03-19

## 2023-03-17 RX ORDER — ASPIRIN/CALCIUM CARB/MAGNESIUM 324 MG
1 TABLET ORAL
Qty: 0 | Refills: 0 | DISCHARGE

## 2023-03-17 RX ORDER — RIVAROXABAN 15 MG-20MG
1 KIT ORAL
Qty: 30 | Refills: 0 | DISCHARGE
Start: 2023-03-17 | End: 2023-04-15

## 2023-03-17 RX ORDER — RIVAROXABAN 15 MG-20MG
1 KIT ORAL
Qty: 30 | Refills: 0
Start: 2023-03-17 | End: 2023-04-15

## 2023-03-17 RX ADMIN — Medication 30 MILLIGRAM(S): at 00:04

## 2023-03-17 RX ADMIN — Medication 1000 MILLIGRAM(S): at 22:42

## 2023-03-17 RX ADMIN — Medication 25 MILLIGRAM(S): at 10:37

## 2023-03-17 RX ADMIN — SENNA PLUS 2 TABLET(S): 8.6 TABLET ORAL at 22:44

## 2023-03-17 RX ADMIN — PANTOPRAZOLE SODIUM 40 MILLIGRAM(S): 20 TABLET, DELAYED RELEASE ORAL at 06:13

## 2023-03-17 RX ADMIN — TAMSULOSIN HYDROCHLORIDE 0.4 MILLIGRAM(S): 0.4 CAPSULE ORAL at 22:43

## 2023-03-17 RX ADMIN — Medication 25 MILLIGRAM(S): at 22:43

## 2023-03-17 RX ADMIN — OXYCODONE HYDROCHLORIDE 10 MILLIGRAM(S): 5 TABLET ORAL at 02:50

## 2023-03-17 RX ADMIN — OXYCODONE HYDROCHLORIDE 10 MILLIGRAM(S): 5 TABLET ORAL at 20:00

## 2023-03-17 RX ADMIN — POLYETHYLENE GLYCOL 3350 17 GRAM(S): 17 POWDER, FOR SOLUTION ORAL at 10:37

## 2023-03-17 RX ADMIN — OXYCODONE HYDROCHLORIDE 10 MILLIGRAM(S): 5 TABLET ORAL at 02:09

## 2023-03-17 RX ADMIN — Medication 30 MILLIGRAM(S): at 06:13

## 2023-03-17 RX ADMIN — PREGABALIN 1000 MICROGRAM(S): 225 CAPSULE ORAL at 14:27

## 2023-03-17 RX ADMIN — OXYCODONE HYDROCHLORIDE 10 MILLIGRAM(S): 5 TABLET ORAL at 19:46

## 2023-03-17 RX ADMIN — CEFTRIAXONE 1000 MILLIGRAM(S): 500 INJECTION, POWDER, FOR SOLUTION INTRAMUSCULAR; INTRAVENOUS at 06:12

## 2023-03-17 RX ADMIN — MORPHINE SULFATE 4 MILLIGRAM(S): 50 CAPSULE, EXTENDED RELEASE ORAL at 16:10

## 2023-03-17 RX ADMIN — ATORVASTATIN CALCIUM 10 MILLIGRAM(S): 80 TABLET, FILM COATED ORAL at 22:43

## 2023-03-17 RX ADMIN — Medication 1000 MILLIGRAM(S): at 23:30

## 2023-03-17 RX ADMIN — RIVAROXABAN 15 MILLIGRAM(S): KIT at 18:01

## 2023-03-17 RX ADMIN — Medication 100 MILLIGRAM(S): at 10:37

## 2023-03-17 RX ADMIN — MORPHINE SULFATE 4 MILLIGRAM(S): 50 CAPSULE, EXTENDED RELEASE ORAL at 15:56

## 2023-03-17 NOTE — PROGRESS NOTE ADULT - PROBLEM SELECTOR PLAN 1
According to family AF is not a new diagnosis - reviewed ECGs from earlier this month from [facility] that revealed AF. /Dr. Aquino d/w pt's cardiologist - Dr. Osorio (([phone]), CHADsVASc=3  AC with  xarelto  pt. finished short course of po cardizem - now rate controlled on BB - will continue     pt. stable, will sign off, consult prn According to family AF is not a new diagnosis - reviewed ECGs from earlier this month from [facility] [place] that revealed AF. /Dr. Aquino d/w pt's cardiologist - Dr. Osorio (([phone]), CHADsVASc=3  AC with  xarelto  pt. finished short course of po cardizem - now rate controlled on BB - will continue

## 2023-03-17 NOTE — PROGRESS NOTE ADULT - SUBJECTIVE AND OBJECTIVE BOX
HOSPITALIST ATTENDING PROGRESS NOTE    Chart and meds reviewed.  Patient seen and examined.    CC: fall w flank pain    Subjective: Feeling better this am, with improved pain however then with further pain in afternoon, requesting iv pain meds. Denies f/c.    All other systems reviewed and found to be negative with the exception of what has been described above.    MEDICATIONS  (STANDING):  acetaminophen     Tablet .. 1000 milliGRAM(s) Oral every 8 hours  atorvastatin 10 milliGRAM(s) Oral at bedtime  cefTRIAXone Injectable. 1000 milliGRAM(s) IV Push every 24 hours  influenza  Vaccine (HIGH DOSE) 0.7 milliLiter(s) IntraMuscular once  Inrebic (fedratinib) 2 Capsule(s) 2 Capsule(s) Oral daily  metoprolol tartrate 25 milliGRAM(s) Oral every 12 hours  naloxone Injectable 0.4 milliGRAM(s) IV Push once  pantoprazole    Tablet 40 milliGRAM(s) Oral before breakfast  polyethylene glycol 3350 17 Gram(s) Oral daily  rivaroxaban 15 milliGRAM(s) Oral with dinner  senna 2 Tablet(s) Oral at bedtime  tamsulosin 0.4 milliGRAM(s) Oral at bedtime  thiamine 100 milliGRAM(s) Oral daily    MEDICATIONS  (PRN):  aluminum hydroxide/magnesium hydroxide/simethicone Suspension 30 milliLiter(s) Oral every 4 hours PRN Dyspepsia  bisacodyl 5 milliGRAM(s) Oral daily PRN Constipation  melatonin 3 milliGRAM(s) Oral at bedtime PRN Insomnia  morphine  - Injectable 4 milliGRAM(s) IV Push every 4 hours PRN Severe Pain (7 - 10)  ondansetron Injectable 4 milliGRAM(s) IV Push every 8 hours PRN Nausea and/or Vomiting  oxyCODONE    IR 5 milliGRAM(s) Oral every 4 hours PRN Mild Pain (1 - 3)  oxyCODONE    IR 10 milliGRAM(s) Oral every 4 hours PRN Moderate Pain (4 - 6)      VITALS:  T(F): 97.7 (03-17-23 @ 07:49), Max: 97.7 (03-17-23 @ 07:49)  HR: 98 (03-17-23 @ 07:49) (79 - 98)  BP: 112/64 (03-17-23 @ 07:49) (112/60 - 124/65)  RR: 18 (03-17-23 @ 07:49) (18 - 18)  SpO2: 88% (03-17-23 @ 10:46) (88% - 94%)  Wt(kg): --    I&O's Summary    16 Mar 2023 07:01  -  17 Mar 2023 07:00  --------------------------------------------------------  IN: 0 mL / OUT: 650 mL / NET: -650 mL        CAPILLARY BLOOD GLUCOSE          PHYSICAL EXAM:  Gen: NAD  HEENT:  pupils equal and reactive, EOMI, no oropharyngeal lesions, erythema, exudates, oral thrush  NECK:   supple, no carotid bruits, no palpable lymph nodes, no thyromegaly  CV:  +S1, +S2, regular, no murmurs or rubs  RESP:   lungs clear to auscultation bilaterally, no wheezing, rales, rhonchi, good air entry bilaterally  BREAST:  not examined  GI:  abdomen soft, non-tender, non-distended, normal BS, no bruits, no abdominal masses, no palpable masses  RECTAL:  not examined  :  not examined  MSK:   normal muscle tone, no atrophy, no rigidity, no contractions  EXT:  no clubbing, no cyanosis, no edema, no calf pain, swelling or erythema  VASCULAR:  pulses equal and symmetric in the upper and lower extremities  NEURO:  AAOX3, no focal neurological deficits, follows all commands, able to move extremities spontaneously  SKIN:  no ulcers, lesions or rashes    LABS:                            7.7    69.20 )-----------( 1317     ( 17 Mar 2023 07:24 )             24.9     03-17    127<L>  |  92<L>  |  41<H>  ----------------------------<  130<H>  5.2   |  32<H>  |  1.30    Ca    8.9      17 Mar 2023 07:24  Mg     2.5     03-17    TPro  6.9  /  Alb  2.7<L>  /  TBili  0.3  /  DBili  x   /  AST  16  /  ALT  13  /  AlkPhos  143<H>  03-16        LIVER FUNCTIONS - ( 16 Mar 2023 07:03 )  Alb: 2.7 g/dL / Pro: 6.9 gm/dL / ALK PHOS: 143 U/L / ALT: 13 U/L / AST: 16 U/L / GGT: x           PTT - ( 16 Mar 2023 07:57 )  PTT:53.2 sec    CULTURES:  UCx >3org, contam    Additional results/Imaging, I have personally reviewed:  CTH, CT cspine 3/11/23: Head CT: No CT evidence of acute intracranial hemorrhage. C-spine CT:  No acute fracture. Extensive bilateral groundglass densities in the partially visualized lung apices. Correlate clinically. Additional evaluation may be indicated.    CT L spine 3/11/23: No evidence of an acute lumbar spine fracture. Scoliosis with multilevel degenerative changes. Intra-abdominal findings as described. Correlate with abdominal CT    CT c/a/p w iv contrast 3/14/23: Confluent pulmonary groundglass opacities with peripheral sparing as well as bilateral pleural effusions and small to moderate pericardial effusion in the setting of cardiomegaly. Findings may represent degenerative pulmonary edema. Differential for the pulmonary opacities is inflammatory infectious etiologies. Massive hepatosplenomegaly. 2 wedge-shaped foci of hypoattenuation in the spleen for which the differential includes age indeterminant infarct and laceration. Infarct is favored given the lack of perisplenic hemorrhage. Distal right ureteral stone with mild right hydronephrosis. Acute mild superior endplate compression fracture of L3 with minimal bony retropulsion. Incidental note of a 5 mm right upper lobe pulmonary nodule. Fleischner society recommendations for incidental pulmonary nodule follow-up: >4-6mm: Non-smoker: 12 month follow-up chest CT recommended and if unchanged, no further follow-up necessary. Smoker: Initial follow-up chest CT at 6-12 months then at 18-24 months if no change.    Echo 3/15/23: The aortic valve is well visualized, appears mildly calcified. Valve opening seems to be normal. The ascending aorta appears to be mildly dilated. The left atrium appears moderately dilated. The left ventricle is normal in size, wall thickness, wall motion and contractility. Estimated left ventricular ejection fraction is 60 %. IVC is dilated and not collapsing with inspiration. Mild mitral annular calcification is present. There is thickening of both mitral valve leaflets. The leaflet opening is normal. Mild (1+) mitral regurgitation is present. A small pericardial effusion is present. Pleural effusion - is present. Mild to moderate pulmonic valvular regurgitation (1+) is present. The right atrium appears mildly dilated. Normal appearing right ventricle structure and function. Moderate (2+) tricuspid valve regurgitation is present.    Telemetry, personally reviewed:  3/16/23: Afib   3/17/23: Afib

## 2023-03-17 NOTE — PROGRESS NOTE ADULT - SUBJECTIVE AND OBJECTIVE BOX
Pt seen resting in bed. Pt states she is feeling better regarding the lower back pain compared to yesterday. Pt denies lower extremities pain, numbness, and weakness. LSO was fitted yesterday. Brace seen at bedside. Pt voids on own.  Lower back pain is worse with movement. Pain is alleviated with immobility.    PE  Gen appearance: NAD  motor strength: 5/5 of b/l quads, b/l HF, b/l gastrocs, b/l ant tibs, b/l EHL  Sensation: intact to light touch of bilat lower ext  Tenderness to palpation of the right paraspinal region.    Plan--Acute mild superior endplate compression fracture of L3 with minimal bony retropulsion  - LSO seen at bedside. Instructed patient regarding the use of LSO brace  - No acute spine surgical intervention.  - Continue analgesics  - Sign off from spine service.

## 2023-03-17 NOTE — PROGRESS NOTE ADULT - SUBJECTIVE AND OBJECTIVE BOX
71 year old woman with a history of atrial fibrillation (treated with metoprolol and aspirin), small-medium pericardial effusion, myelofibrosis, and recent pneumonia who presented to the ER with complaints of severe R flank and / posterior chest wall pain x 1 day after falling on her buttocks several days earlier.    A. Pt is on O2 for recent diagnosis for PNA 10-12 days for which she is on cefuroxime and  was in [facility], received recent blood transfusion for anemia recently. Pt reports her flank pain feels similar to when she was admitted for PNA recently. Pt cannot have Ibuprofen due to her myelofibrosis.  Pt with recent fall 3 days ago worsening R flank and R posterior chest wall pain. Recent hospitalization for PNA with symptoms improving.,  Massive hepatosplenomegaly with 2 wegde shaped infarcts to liver. alos found was mild right ureteral stone with mild right hydro/ An acute L3 compression fracture was identified. small 5mm RU nodule.   Renal eval called for hyponatremia from Na 130 to 125 today      today    pain is improved   limiting water intake   feels less swollen       PAST MEDICAL & SURGICAL HISTORY:  Myelofibrosis  PNA (pneumonia)  nemia      MEDICATIONS  (STANDING):  acetaminophen     Tablet .. 1000 milliGRAM(s) Oral every 8 hours  atorvastatin 10 milliGRAM(s) Oral at bedtime  cefTRIAXone Injectable. 1000 milliGRAM(s) IV Push every 24 hours  influenza  Vaccine (HIGH DOSE) 0.7 milliLiter(s) IntraMuscular once  Inrebic (fedratinib) 2 Capsule(s) 2 Capsule(s) Oral daily  naloxone Injectable 0.4 milliGRAM(s) IV Push once  pantoprazole    Tablet 40 milliGRAM(s) Oral before breakfast  polyethylene glycol 3350 17 Gram(s) Oral daily  rivaroxaban 15 milliGRAM(s) Oral with dinner  senna 2 Tablet(s) Oral at bedtime  tamsulosin 0.4 milliGRAM(s) Oral at bedtime  thiamine 100 milliGRAM(s) Oral daily    Vital Signs Last 24 Hrs  T(C): 36.8 (17 Mar 2023 21:17), Max: 36.8 (17 Mar 2023 21:17)  T(F): 98.2 (17 Mar 2023 21:17), Max: 98.2 (17 Mar 2023 21:17)  HR: 93 (17 Mar 2023 21:17) (79 - 98)  BP: 126/68 (17 Mar 2023 21:17) (112/60 - 126/68)  BP(mean): --  RR: 18 (17 Mar 2023 21:17) (18 - 18)  SpO2: 93% (17 Mar 2023 21:17) (88% - 94%)    Parameters below as of 17 Mar 2023 21:17  Patient On (Oxygen Delivery Method): nasal cannula  O2 Flow (L/min): 2        PHYSICAL EXAM:    Constitutional: NAD  HEENT:, EOMI,  MMM  Neck: No LAD, No JVD  Respiratory: CTAB  Cardiovascular: S1 and S2  Gastrointestinal: left sided CVAT, no gaurd or rebound   abd distended w palplable hepatosplenomegaly   Extremities: 1+  peripheral edema  Neurological: A/O x 3, no focal deficits  : No Linn  Skin: No rashes  Access: Not applicable    LABS:                                     7.7    69.20 )-----------( 1317     ( 17 Mar 2023 07:24 )             24.9                         7.9    74.66 )-----------( 1346     ( 16 Mar 2023 15:18 )             25.1         127    |  92     |  41     ----------------------------<  130       17 Mar 2023 07:24  5.2     |  32     |  1.30     126    |  92     |  38     ----------------------------<  138       16 Mar 2023 15:18  4.3     |  29     |  1.21     125    |  93     |  38     ----------------------------<  124       16 Mar 2023 07:03  4.5     |  28     |  1.17     Ca    8.9        17 Mar 2023 07:24  Ca    8.3        16 Mar 2023 15:18      Mg     2.5       17 Mar 2023 07:24    TPro  6.9    /  Alb  2.7    /  TBili  0.3    /        16 Mar 2023 07:03  DBili  x      /  AST  16     /  ALT  13     /  AlkPhos  143      TPro  7.0    /  Alb  2.8    /  TBili  0.3    /        15 Mar 2023 10:38  DBili  x      /  AST  19     /  ALT  17     /  AlkPhos  154          TPro  6.9    /  Alb  2.7    /  TBili  0.3    /        16 Mar 2023 07:03  DBili  x      /  AST  16     /  ALT  13     /  AlkPhos  143      TPro  7.0    /  Alb  2.8    /  TBili  0.3    /        15 Mar 2023 10:38  DBili  x      /  AST  19     /  ALT  17     /  AlkPhos  154          Ca    8.1<L>      16 Mar 2023 07:03    TPro  6.9  /  Alb  2.7<L>  /  TBili  0.3  /  DBili  x   /  AST  16  /  ALT  13  /  AlkPhos  143<H>  03-16      Urine Studies:  Urinalysis Basic - ( 14 Mar 2023 20:47 )    Color: Yellow / Appearance: Clear / S.010 / pH: x  Gluc: x / Ketone: Negative  / Bili: Negative / Urobili: Negative   Blood: x / Protein: 30 mg/dL / Nitrite: Negative   Leuk Esterase: Negative / RBC: 0-2 /HPF / WBC 11-25 /HPF   Sq Epi: x / Non Sq Epi: x / Bacteria: Few    RADIOLOGY & ADDITIONAL STUDIES:

## 2023-03-17 NOTE — PROGRESS NOTE ADULT - PROBLEM SELECTOR PLAN 2
·  Problem: Pericardial effusion.   ·  Recommendation: Pericardial effusion does not seem to be increasing in size -- an echo from 3/6/23 at [facility] describes a small-medium sized effusion (and normal LF systolic function).
·  Problem: Pericardial effusion.   ·  Recommendation: Pericardial effusion does not seem to be increasing in size -- an echo from 3/6/23 at [facility] describes a small-medium sized effusion (and normal LF systolic function).

## 2023-03-17 NOTE — PROGRESS NOTE ADULT - SUBJECTIVE AND OBJECTIVE BOX
Patient seen by PA  Chart reviewed  case reviewed with PA    All spine studies reviewed    Benign appearing superior L3 endplate fracture - acute    Pain controlled on present regimen  Mobilize with PT  Brace at bedside     - Stable L3 compression fx   - Non-op treatment recommended   - Continue bracing   - F/U in 2 weeks time for F/U imaging in office    TANVIR Batista MD

## 2023-03-17 NOTE — PROGRESS NOTE ADULT - SUBJECTIVE AND OBJECTIVE BOX
REASON FOR VISIT: AF    HPI:  71 year old woman with a history of atrial fibrillation (treated with metoprolol and aspirin), small-medium pericardial effusion, myelofibrosis, and recent pneumonia who presented to the ER with complaints of severe R flank and / posterior chest wall pain x 1 day after falling on her buttocks several days earlier. Cardiology was called to assist in the management of her atrial fibrillation.  I spoke to the patient's family at the bedside - report AF that was diagnosed ~ 2 years ago (not previously anticoagulated) -- sees Dr Osorio in [place].  Mrs. Moncada has no cardiac complaints -- specifically, no chest discomfort, dyspnea, or palpitations.    3/16/23: no complaints, Tele: Afib with RVR - , BB added  3/17/23: no cardiac complaints, Tele: Afib 80-90    MEDICATIONS  (STANDING):  acetaminophen     Tablet .. 1000 milliGRAM(s) Oral every 8 hours  atorvastatin 10 milliGRAM(s) Oral at bedtime  cefTRIAXone Injectable. 1000 milliGRAM(s) IV Push every 24 hours  influenza  Vaccine (HIGH DOSE) 0.7 milliLiter(s) IntraMuscular once  Inrebic (fedratinib) 2 Capsule(s) 2 Capsule(s) Oral daily  metoprolol tartrate 25 milliGRAM(s) Oral every 12 hours  naloxone Injectable 0.4 milliGRAM(s) IV Push once  pantoprazole    Tablet 40 milliGRAM(s) Oral before breakfast  polyethylene glycol 3350 17 Gram(s) Oral daily  rivaroxaban 15 milliGRAM(s) Oral with dinner  senna 2 Tablet(s) Oral at bedtime  tamsulosin 0.4 milliGRAM(s) Oral at bedtime  thiamine 100 milliGRAM(s) Oral daily      MEDICATIONS  (PRN):  aluminum hydroxide/magnesium hydroxide/simethicone Suspension 30 milliLiter(s) Oral every 4 hours PRN Dyspepsia  bisacodyl 5 milliGRAM(s) Oral daily PRN Constipation  heparin   Injectable 4500 Unit(s) IV Push every 6 hours PRN For aPTT less than 40  heparin   Injectable 2000 Unit(s) IV Push every 6 hours PRN For aPTT between 40 - 57  melatonin 3 milliGRAM(s) Oral at bedtime PRN Insomnia  morphine  - Injectable 2 milliGRAM(s) IV Push every 4 hours PRN Severe Pain (7 - 10)  ondansetron Injectable 4 milliGRAM(s) IV Push every 8 hours PRN Nausea and/or Vomiting  oxyCODONE    IR 5 milliGRAM(s) Oral every 4 hours PRN Moderate Pain (4 - 6)  oxyCODONE    IR 10 milliGRAM(s) Oral every 4 hours PRN Severe Pain (7 - 10)    Vital Signs Last 24 Hrs  T(C): 36.5 (17 Mar 2023 07:49), Max: 36.5 (17 Mar 2023 07:49)  T(F): 97.7 (17 Mar 2023 07:49), Max: 97.7 (17 Mar 2023 07:49)  HR: 98 (17 Mar 2023 07:49) (79 - 98)  BP: 112/64 (17 Mar 2023 07:49) (111/54 - 124/65)  BP(mean): --  RR: 18 (17 Mar 2023 07:49) (18 - 18)  SpO2: 88% (17 Mar 2023 10:46) (88% - 94%)    Parameters below as of 17 Mar 2023 10:46  Patient On (Oxygen Delivery Method): room air      PHYSICAL EXAM:  Constitutional: NAD, awake and alert  HEENT:  EOMI,  Pupils round, No oral cyanosis.  Pulmonary: Non-labored, breath sounds are clear bilaterally, No wheezing, rales or rhonchi  Cardiovascular: Irregular, mild tachycardia  Gastrointestinal: Bowel Sounds present, soft, nontender.   Lymph: No edema.   Neurological: Alert, no focal deficits  Skin: No rashes.  Psych:  Mood & affect appropriate    LABS:                         7.7    69.20 )-----------( 1317     ( 17 Mar 2023 07:24 )             24.9     03-17    127<L>  |  92<L>  |  41<H>  ----------------------------<  130<H>  5.2   |  32<H>  |  1.30    Ca    8.9      17 Mar 2023 07:24    TPro  6.9  /  Alb  2.7<L>  /  TBili  0.3  /  DBili  x   /  AST  16  /  ALT  13  /  AlkPhos  143<H>  03-16    - TroponinI hsT: <-7.24                     7.3    63.23 )-----------( 1166     ( 16 Mar 2023 07:03 )             23.1     03-16    125<L>  |  93<L>  |  38<H>  ----------------------------<  124<H>  4.5   |  28  |  1.17    Ca    8.1<L>      16 Mar 2023 07:03    TPro  6.9  /  Alb  2.7<L>  /  TBili  0.3  /  DBili  x   /  AST  16  /  ALT  13  /  AlkPhos  143<H>  03-16    - TroponinI hsT: <-7.24                7.9    67.22 )-----------( 1332     ( 15 Mar 2023 10:38 )             25.8             126    |  92     |  34     ----------------------------<  123    4.8     |  29     |  1.03     12 Lead ECG (03.14.23 @ 21:56):  Atrial fibrillation with rapid ventricular response  Right ventricular hypertrophy  Possible Anterolateral infarct (cited on or before 14-MAR-2023)  Abnormal ECG    CT Chest w/ IV Cont (03.14.23 @ 21:35):  Confluent pulmonary groundglass opacities with peripheral sparing as well as bilateral pleural effusions and small to moderate pericardial effusion in the setting of cardiomegaly. Findings may represent degenerative pulmonary edema. Differential for the pulmonary opacities is inflammatory infectious etiologies.  Massive hepatosplenomegaly. 2 wedge-shaped foci of hypoattenuation in the spleen for which the differential includes age indeterminant infarct and laceration. Infarct is favored given the lack of perisplenic hemorrhage.  Distal right ureteral stone with mild right hydronephrosis.  Acute mild superior endplate compression fracture of L3 with minimal bony retropulsion.  Incidental note of a 5 mm right upper lobe pulmonary nodule.     Tele: AF rate ~ 100 bpm    < from: TTE Echo Complete w/o Contrast w/ Doppler (03.15.23 @ 11:22) >   Impression     Summary     The aortic valve is well visualized, appears mildly calcified. Valve   opening seems to be normal.   The ascending aorta appears to be mildly dilated.   The left atrium appears moderately dilated.   The left ventricle is normal in size, wall thickness, wall motion and   contractility.   Estimated left ventricular ejection fraction is 60 %.   IVC is dilated and not collapsing with inspiration.   Mild mitral annular calcification is present.   There is thickening of both mitral valve leaflets. The leaflet opening is   normal.   Mild (1+) mitral regurgitation is present.   A small pericardial effusion is present.   Pleural effusion - is present.   Mild to moderate pulmonic valvular regurgitation (1+) is present.   The right atrium appears mildly dilated.   Normal appearing right ventricle structure and function.   Moderate (2+) tricuspid valve regurgitation is present.     Signature     ----------------------------------------------------------------   Electronically signed by Caroline Perez MD(Interpreting   physician) on 03/15/2023 05:55 PM    < end of copied text >

## 2023-03-17 NOTE — PROGRESS NOTE ADULT - NS ATTEND AMEND GEN_ALL_CORE FT
Patient with afib , with improved rate control , with moderate stable pericardial effusion , continue medication , follow up with op cardiologist
I spoke to Dr Khris Osorio (outpatient cardiologist) - time of onset of AF is unclear but it was present prior to this admission and she was previously prescribed Xarelto -- rate control is fair (resume metoprolol).

## 2023-03-17 NOTE — PHARMACOTHERAPY INTERVENTION NOTE - COMMENTS
Contacted [facility] pharmacy ([phone]) to obtain cost of Xarelto. As per pharmacy, patient has a copay of $28.92 for a 30 day supply of medication.

## 2023-03-17 NOTE — CHART NOTE - NSCHARTNOTEFT_GEN_A_CORE
Patient will require walker due to L3 compression fracture for discharge.
Patient was measured and dispensed a LSO with rigid anterior posterior and lateral panels. The LSO will stabilize and control the lumbar spine, reduce pain and ROM and facilitate healing of the fracture. The patient and her  were educated on the care use and function of the orthosis. COntact info was given to patient. All went without incident.   Melquiades Gray [facility]  [phone]

## 2023-03-18 LAB
ABO RH CONFIRMATION: SIGNIFICANT CHANGE UP
ALLERGY+IMMUNOLOGY DIAG STUDY NOTE: SIGNIFICANT CHANGE UP
ANION GAP SERPL CALC-SCNC: 5 MMOL/L — SIGNIFICANT CHANGE UP (ref 5–17)
BUN SERPL-MCNC: 45 MG/DL — HIGH (ref 7–23)
CALCIUM SERPL-MCNC: 8.9 MG/DL — SIGNIFICANT CHANGE UP (ref 8.5–10.1)
CHLORIDE SERPL-SCNC: 97 MMOL/L — SIGNIFICANT CHANGE UP (ref 96–108)
CO2 SERPL-SCNC: 30 MMOL/L — SIGNIFICANT CHANGE UP (ref 22–31)
CREAT SERPL-MCNC: 1.33 MG/DL — HIGH (ref 0.5–1.3)
EGFR: 43 ML/MIN/1.73M2 — LOW
GLUCOSE SERPL-MCNC: 120 MG/DL — HIGH (ref 70–99)
HCT VFR BLD CALC: 22.8 % — LOW (ref 34.5–45)
HGB BLD-MCNC: 7.1 G/DL — LOW (ref 11.5–15.5)
MCHC RBC-ENTMCNC: 28.4 PG — SIGNIFICANT CHANGE UP (ref 27–34)
MCHC RBC-ENTMCNC: 31.1 GM/DL — LOW (ref 32–36)
MCV RBC AUTO: 91.2 FL — SIGNIFICANT CHANGE UP (ref 80–100)
PLATELET # BLD AUTO: 1201 K/UL — CRITICAL HIGH (ref 150–400)
POTASSIUM SERPL-MCNC: 5.3 MMOL/L — SIGNIFICANT CHANGE UP (ref 3.5–5.3)
POTASSIUM SERPL-SCNC: 5.3 MMOL/L — SIGNIFICANT CHANGE UP (ref 3.5–5.3)
RBC # BLD: 2.5 M/UL — LOW (ref 3.8–5.2)
RBC # FLD: 20.9 % — HIGH (ref 10.3–14.5)
SODIUM SERPL-SCNC: 132 MMOL/L — LOW (ref 135–145)
WBC # BLD: 66.6 K/UL — CRITICAL HIGH (ref 3.8–10.5)
WBC # FLD AUTO: 66.6 K/UL — CRITICAL HIGH (ref 3.8–10.5)

## 2023-03-18 PROCEDURE — 86077 PHYS BLOOD BANK SERV XMATCH: CPT

## 2023-03-18 PROCEDURE — 99233 SBSQ HOSP IP/OBS HIGH 50: CPT

## 2023-03-18 RX ORDER — DIPHENHYDRAMINE HCL 50 MG
25 CAPSULE ORAL ONCE
Refills: 0 | Status: COMPLETED | OUTPATIENT
Start: 2023-03-18 | End: 2023-03-18

## 2023-03-18 RX ORDER — FUROSEMIDE 40 MG
40 TABLET ORAL ONCE
Refills: 0 | Status: COMPLETED | OUTPATIENT
Start: 2023-03-18 | End: 2023-03-18

## 2023-03-18 RX ORDER — ACETAMINOPHEN 500 MG
650 TABLET ORAL ONCE
Refills: 0 | Status: COMPLETED | OUTPATIENT
Start: 2023-03-18 | End: 2023-03-18

## 2023-03-18 RX ORDER — SODIUM ZIRCONIUM CYCLOSILICATE 10 G/10G
5 POWDER, FOR SUSPENSION ORAL ONCE
Refills: 0 | Status: COMPLETED | OUTPATIENT
Start: 2023-03-18 | End: 2023-03-18

## 2023-03-18 RX ADMIN — Medication 50 MILLIGRAM(S): at 09:28

## 2023-03-18 RX ADMIN — Medication 1000 MILLIGRAM(S): at 07:40

## 2023-03-18 RX ADMIN — ATORVASTATIN CALCIUM 10 MILLIGRAM(S): 80 TABLET, FILM COATED ORAL at 22:53

## 2023-03-18 RX ADMIN — POLYETHYLENE GLYCOL 3350 17 GRAM(S): 17 POWDER, FOR SOLUTION ORAL at 09:28

## 2023-03-18 RX ADMIN — Medication 100 MILLIGRAM(S): at 09:28

## 2023-03-18 RX ADMIN — Medication 25 MILLIGRAM(S): at 18:40

## 2023-03-18 RX ADMIN — SODIUM ZIRCONIUM CYCLOSILICATE 5 GRAM(S): 10 POWDER, FOR SUSPENSION ORAL at 12:53

## 2023-03-18 RX ADMIN — RIVAROXABAN 15 MILLIGRAM(S): KIT at 16:29

## 2023-03-18 RX ADMIN — Medication 1000 MILLIGRAM(S): at 22:53

## 2023-03-18 RX ADMIN — TAMSULOSIN HYDROCHLORIDE 0.4 MILLIGRAM(S): 0.4 CAPSULE ORAL at 22:53

## 2023-03-18 RX ADMIN — Medication 40 MILLIGRAM(S): at 22:53

## 2023-03-18 RX ADMIN — CEFTRIAXONE 1000 MILLIGRAM(S): 500 INJECTION, POWDER, FOR SOLUTION INTRAMUSCULAR; INTRAVENOUS at 06:02

## 2023-03-18 RX ADMIN — Medication 1000 MILLIGRAM(S): at 06:52

## 2023-03-18 RX ADMIN — Medication 1000 MILLIGRAM(S): at 16:29

## 2023-03-18 NOTE — PROGRESS NOTE ADULT - SUBJECTIVE AND OBJECTIVE BOX
Patient is a 72y Female who reports no complaints as new.       MEDICATIONS  (STANDING):  acetaminophen     Tablet .. 1000 milliGRAM(s) Oral every 8 hours  atorvastatin 10 milliGRAM(s) Oral at bedtime  furosemide   Injectable 40 milliGRAM(s) IV Push once  influenza  Vaccine (HIGH DOSE) 0.7 milliLiter(s) IntraMuscular once  Inrebic (fedratinib) 2 Capsule(s) 2 Capsule(s) Oral daily  metoprolol succinate ER 50 milliGRAM(s) Oral daily  naloxone Injectable 0.4 milliGRAM(s) IV Push once  pantoprazole    Tablet 40 milliGRAM(s) Oral before breakfast  polyethylene glycol 3350 17 Gram(s) Oral daily  rivaroxaban 15 milliGRAM(s) Oral with dinner  senna 2 Tablet(s) Oral at bedtime  tamsulosin 0.4 milliGRAM(s) Oral at bedtime  thiamine 100 milliGRAM(s) Oral daily    MEDICATIONS  (PRN):  aluminum hydroxide/magnesium hydroxide/simethicone Suspension 30 milliLiter(s) Oral every 4 hours PRN Dyspepsia  bisacodyl 5 milliGRAM(s) Oral daily PRN Constipation  melatonin 3 milliGRAM(s) Oral at bedtime PRN Insomnia  morphine  - Injectable 4 milliGRAM(s) IV Push every 4 hours PRN Severe Pain (7 - 10)  ondansetron Injectable 4 milliGRAM(s) IV Push every 8 hours PRN Nausea and/or Vomiting  oxyCODONE    IR 5 milliGRAM(s) Oral every 4 hours PRN Mild Pain (1 - 3)  oxyCODONE    IR 10 milliGRAM(s) Oral every 4 hours PRN Moderate Pain (4 - 6)        T(C): , Max: 36.8 (03-17-23 @ 21:17)  T(F): , Max: 98.2 (03-17-23 @ 21:17)  HR: 101 (03-18-23 @ 07:38)  BP: 114/63 (03-18-23 @ 07:38)  BP(mean): --  RR: 17 (03-18-23 @ 07:38)  SpO2: 99% (03-18-23 @ 07:38)  Wt(kg): --    03-17 @ 07:01  -  03-18 @ 07:00  --------------------------------------------------------  IN: 0 mL / OUT: 650 mL / NET: -650 mL          PHYSICAL EXAM:    Constitutional: NAD, frail  HEENT:  MM  Neck: No LAD, No JVD  Respiratory: dist  Cardiovascular: S1 and S2   Extremities: No peripheral edema      LABS:                        7.1    66.60 )-----------( 1201     ( 18 Mar 2023 06:22 )             22.8     18 Mar 2023 06:22    132    |  97     |  45     ----------------------------<  120    5.3     |  30     |  1.33   17 Mar 2023 07:24    127    |  92     |  41     ----------------------------<  130    5.2     |  32     |  1.30   16 Mar 2023 15:18    126    |  92     |  38     ----------------------------<  138    4.3     |  29     |  1.21   16 Mar 2023 07:03    125    |  93     |  38     ----------------------------<  124    4.5     |  28     |  1.17   15 Mar 2023 10:38    126    |  92     |  34     ----------------------------<  123    4.8     |  29     |  1.03     Ca    8.9        18 Mar 2023 06:22  Ca    8.9        17 Mar 2023 07:24  Ca    8.3        16 Mar 2023 15:18  Ca    8.1        16 Mar 2023 07:03  Ca    8.1        15 Mar 2023 10:38  Mg     2.5       17 Mar 2023 07:24    TPro  6.9    /  Alb  2.7    /  TBili  0.3    /  DBili  x      /  AST  16     /  ALT  13     /  AlkPhos  143    16 Mar 2023 07:03  TPro  7.0    /  Alb  2.8    /  TBili  0.3    /  DBili  x      /  AST  19     /  ALT  17     /  AlkPhos  154    15 Mar 2023 10:38  TPro  7.0    /  Alb  2.8    /  TBili  0.4    /  DBili  x      /  AST  20     /  ALT  19     /  AlkPhos  147    14 Mar 2023 20:54          Urine Studies:          RADIOLOGY & ADDITIONAL STUDIES:

## 2023-03-18 NOTE — PROGRESS NOTE ADULT - ASSESSMENT
71 yo female with a PMHx of myelofibrosis, PNA, anemia, on baby ASA presents ambulatory to ED c/o worsening severe R flank/posteior chest wall pain x 1 day. Patient fell on her buttocks on Saturday was brought to .  Patient took Tylenol prior to arrival with no relief. Pt is on baby ASA. Pt is on O2 for recent diagnosis for PNA 10-12 days for which she is on cefuroxime and  was in [facility], received recent blood transfusion for anemia recently. Pt reports her flank pain feels similar to when she was admitted for PNA recently. Pt cannot have Ibuprofen due to her myelofibrosis.  Pt with recent fall 3 days ago worsening R flank and R posterior chest wall pain. Recent hospitalization for PNA with symptoms improving. Pain not controlled at home with Tylenol. Plan for Morphine for pain control, CT chest/abdomen/pelvis revealed B/L pleural effusions and mod sized pericardial effusion with cardiomegaly and possible pulm edema. Massive hepatosplenomegaly with 2 wegde shaped infarcts to liver. alos found was mild right ureteral stone with mild right hydro/ An acute L3 compression fracture was identified. small 5mm RU nodule.   ED provider noted:  in comparison to recent outpatient labs that we reviewed on her portal, white count has increased from 40s to 60s, hemoglobin improved from 6.6 to above 8, platelets improved from 14 100-13 100.  Bandemia remains similar.  These are all baseline per patient and family. Creatinine normal    Ed course: found to be in new onset rapid afib HR range 110-120bpm.   EKG: Afib with RVR, with RV hypertrophy. Poor R wave progression. No dynamic stt changes. QTc normal. Tele:  presently. TNI negative x 1.  Serum potassium 5.9 with Na 130. Given lokelma, morphine and 2L normal saline    #New onset atrial fibrillation with RVR  -tele  -TSH wnl  -trops neg  -watch lytes  -echo as above  -cardizem (likely d/c 3/17), metop  -full a/c now Whit has Hgb stable on heparin drip (will need to check cost) (cleared for full a/c by heme onc)  -f/u cards recs    #hyperK  -resolved, trend    #H/o myelofibrosis  -resumed GABRIELA Kinase therapy today, Inrebic 200mg qd  -appreciated heme onc onput, no contraindication to full a/c    #Right ureteral stone/Mild right hydronephrosis  -monitor Cr  -UCx contam  -will give CTX x 3d total  -flomax  -pain control  -appreciate urology input, outpt f/u with Dr. Valdez    #Fall/L3 compression fracture with retropulsion  -LSO brace  -no acute surgical intervention  -appreciate ortho spine input  -PT eval    #Hyponatremia  -initially received IVF and then diuresis  -also w significant pain  -possibly hypervolemic vs SIADH 2/2 pain  -consider further lasix, repeat BMP in am  -limit free water intake to 1.2L  -f/u renal recs    #pulm nodule  -outpt surveillance CT    #DVT ppx- full a/c w Xarelto    #Dispo pending adequate pain control (still on iv morphine, Na improvement, HR control)    Updated family at bedside      
70 yo female with hx of Afib, pericardial effusion myelofibrosis, and recent pna presenting with back pains since fall days earlier   On admission found to have right hydro due to right ureteral stone, acute L3 compression fracture     Hyponatremia -  s.p IVF NS and IV lasix on board   - hypervolemic vs siadh ( euvolemia ) sec to pains    - hold further lasix and monitor Na - appears to be improved   - pain control    - limit fluid intake to 1 liter per day    - trend labs    - osm, uric acid will not be accurate while on lasix   - if Na  drops may require nacl tablets to stem the drop     dkay Barrow this AM     * pt seen earlier, note now     Dr Gusman covering weekend and next week           
71 yo female with a PMHx of myelofibrosis, PNA, anemia, on baby ASA presents ambulatory to ED c/o worsening severe R flank/posteior chest wall pain x 1 day. Patient fell on her buttocks on Saturday was brought to .  Patient took Tylenol prior to arrival with no relief. Pt is on baby ASA. Pt is on O2 for recent diagnosis for PNA 10-12 days for which she is on cefuroxime and  was in [facility], received recent blood transfusion for anemia recently. Pt reports her flank pain feels similar to when she was admitted for PNA recently. Pt cannot have Ibuprofen due to her myelofibrosis.  Pt with recent fall 3 days ago worsening R flank and R posterior chest wall pain. Recent hospitalization for PNA with symptoms improving. Pain not controlled at home with Tylenol. Plan for Morphine for pain control, CT chest/abdomen/pelvis revealed B/L pleural effusions and mod sized pericardial effusion with cardiomegaly and possible pulm edema. Massive hepatosplenomegaly with 2 wegde shaped infarcts to liver. alos found was mild right ureteral stone with mild right hydro/ An acute L3 compression fracture was identified. small 5mm RU nodule.   ED provider noted:  in comparison to recent outpatient labs that we reviewed on her portal, white count has increased from 40s to 60s, hemoglobin improved from 6.6 to above 8, platelets improved from 14 100-13 100.  Bandemia remains similar.  These are all baseline per patient and family. Creatinine normal    Ed course: found to be in new onset rapid afib HR range 110-120bpm.   EKG: Afib with RVR, with RV hypertrophy. Poor R wave progression. No dynamic stt changes. QTc normal. Tele:  presently. TNI negative x 1.  Serum potassium 5.9 with Na 130. Given lokelma, morphine and 2L normal saline    #New onset atrial fibrillation with RVR  -tele  -TSH wnl  -trops neg  -watch lytes  -echo as above  -cardizem stopped, lopressor transition to Toprol  -full a/c now Xarelto (cost $30/mo)  (cleared for full a/c by heme onc)  -f/u cards recs    #hyperK  -resolved, trend    #H/o myelofibrosis  -resumed GABRIELA Kinase therapy today, Inrebic 200mg qd  -appreciated heme onc onput, no contraindication to full a/c    #Right ureteral stone/Mild right hydronephrosis  -monitor Cr  -UCx contam  -will give CTX x 3d total  -flomax  -pain control  -appreciate urology input, outpt f/u with Dr. Valdez    #Fall/L3 compression fracture with retropulsion  -LSO brace  -no acute surgical intervention  -appreciate ortho spine input  -PT eval    #Hyponatremia  -initially received IVF and then diuresis  -also w significant pain  -possibly hypervolemic vs SIADH 2/2 pain  -consider further lasix, repeat BMP in am  -limit free water intake to 1.2L  -f/u renal recs    #pulm nodule  -outpt surveillance CT    #DVT ppx- full a/c w Xarelto    #Dispo pending adequate pain control (still on iv morphine) and Na improvement, goal >130. Possible weekend d/c   Of note, already has home O2  Updated family at bedside      
71 yo woman with myelofibrosis w/ chronic leukocytosis anemia and thrombocytosis on aspirin, recently admitted at [facility] for pneumonia, discharged with home O2, now presented here for further evaluation of R flank and posterior chest wall pain 1 day after a fall. In the ED she was noted to have new-onset rapid afib, hyponatremia, hyperkalemia, CT imaging findings of small-to-moderate pericardial effusion, cardiomegaly, pulmonary edema, small bilateral pleural effusions, hepatosplenomegaly with 2 splenic infarcts, a small R ureteral stone with mild right hydronephrosis, and an acute L3 compression fracture. Admitted to Medicine.    New-onset atrial fibrillation with RVR  Likely multi factorial in the setting of acute flank pain from the fall with associated L3 compression deformity and R ureteral stone. TSH WNL. May have passed stone. Completed abx. Pain symptoms improved. Remains in afib, HRs 80s-90s, up to 110s with activity. On metoprolol succinate. On Xarelto for stroke risk reduction (cost $30/mo), cleared for full a/c by Heme Onc  - Continue metoprolol succinate  - Continue Xarelto    Fall with acute L3 compression fracture with retropulsion  Appreciate input from Spine Surgery. No need for surgical intervention. Maintain in LSO brace when OOB. Pain control as needed. PT/OT. Pain now better controlled. Ambulating in hallway with rollator.   - Pain control as needed  - LSO brace when OOB  - Continued PT    Right ureteral stone w/ mild right hydronephrosis  Appreciate input from Urology. No fever. No rigors. Patient did receive ceftriaxone x 3 days. Urine culture > 3 morphotypes. Likely contaminated.   - Continue Flomax  - Continue pain control as needed  - Outpatient Urology f/u with Dr. Valdez    Hyponatremia  Possibly hypervolemic vs SIADH 2/2 pain. Na improved today.   - Fluid restriction to 1.2L  - Continue to trend Na    Hyperkalemia  K ~5. Stable  - Continue to monitor    Myelofibrosis  Appreciated Heme Onc input, no contraindication to full a/c, resumed GABRIELA Kinase therapy, Inrebic 200mg qd.   - Continue Inrebic  - Transfuse 1u PRBC today for anemia Hgb ~7    Pulm nodule  As noted on CT  - Outpatient surveillance CT    Acute hypoxic respiratory failure, present prior to admission  As noted when she was recently admitted for pneumonia at [facility], discharged home on O2, still has O2  - Continue home O2 as needed    Deconditioning, debility  Seen by PT, recommended home with home PT  - Continue restorative PT sessions        DVT ppx: Full a/c w Xarelto for afib  Dispo: Anticipate DC home F2F tomorrow 3/19      
72 yo female with hx of Afib, pericardial effusion myelofibrosis, and recent pna presenting with back pains since fall days earlier found to have right hydro due to right ureteral stone, acute L3 compression fracture     Hyponatremia -  s.p IVF NS and IV lasix on board   - hypervolemic vs siadh ( euvolemia ) sec to pains    - hold further lasix and monitor Na - appears to be improved   - limit fluid intake to 1 liter per day      KEVIN  Renal stable, trend panel       dc with staff

## 2023-03-18 NOTE — PROGRESS NOTE ADULT - PROVIDER SPECIALTY LIST ADULT
Hospitalist
Nephrology
Nephrology
Orthopedics
Orthopedics
Cardiology
Orthopedics
Hospitalist
Hospitalist
Cardiology

## 2023-03-18 NOTE — PROGRESS NOTE ADULT - REASON FOR ADMISSION
Fall, flank pain, afib with RVR
fall/R flank pain

## 2023-03-18 NOTE — PROGRESS NOTE ADULT - SUBJECTIVE AND OBJECTIVE BOX
Chief Complaint: Fall, flank pain    Interval Hx: Improved. No significant pain complaints today. Ambulated well with walker in hallway today. Approaching stability for D/C, though Hgb was 7.1 on labs today, 7s-8s of late, ordered for 1u PRBC followed by Lasix given her diastolic CHF.    ROS: Multi system review is comprehensively negative x 10 systems except as above    Vitals:  T(F): 97.6 (18 Mar 2023 07:38), Max: 98.2 (17 Mar 2023 21:17)  HR: 101 (18 Mar 2023 07:38) (93 - 101)  BP: 114/63 (18 Mar 2023 07:38) (114/63 - 126/68)  RR: 17 (18 Mar 2023 07:38) (17 - 18)  SpO2: 99% (18 Mar 2023 07:38) (93% - 99%) on 2L/min via NC    Exam:  Gen: Comfortable appearing  HEENT: NCAT PERRL EOMI MMM clear oropharynx  Neck: Supple, no JVD no LAD  Chest: Normal resp effort, lungs CTA B/L  CVS: s1 s2 normal RRR  Abd: +BS soft NT ND   Ext: No edema or calf tenderness  Skin: Warm, dry  Mood: Calm, pleasant  Neuro: A+Ox3, speech fluent, no aphasia, repetition and naming intact, CN intact, strength and sensation intact, reflexes symmetric and normal, proprioception intact, no ataxia    Labs:                        7.1    66.60 )-------( 1201             22.8       132  |  97  |  45  --------------------< 120  5.3   |  30  |  1.33    Ca 8.9  Mg 2.5    a1c 5.4  TSH 3.96   Lactate WNL  Troponin negative  proBNP 4152    Micro:  COVID19 PCR 3/14: negative  Flu PCR 3/14: negative  RSV PCR 3/14: negative  Urine culture 3/14: negative    Imaging:  CT abd/pelvis W/ 3/14: Massive hepatosplenomegaly. 2 wedge-shaped foci of hypoattenuation in the spleen for which the differential includes age indeterminant infarct and laceration. Infarct is favored given the lack of perisplenic hemorrhage. Distal right ureteral stone with mild right hydronephrosis. Acute mild superior endplate compression fracture of L3 with minimal bony retropulsion.    CT chest W/ 3/14: Confluent pulmonary groundglass opacities with peripheral sparing as well as bilateral pleural effusions and small to moderate pericardial effusion in the setting of cardiomegaly. Findings may represent degenerative pulmonary edema. Differential for the pulmonary opacities is inflammatory infectious etiologies. Incidental note of a 5 mm right upper lobe pulmonary nodule.     CT L spine WO 3/11: Scoliosis with multilevel degenerative changes. No evidence of an acute lumbar spine fracture.    CT C spine WO 3/11: No acute fracture.    CT head WO 3/11:No CT evidence of acute intracranial hemorrhage, mass or large territorial infarction.    Cardiac Testing:  Tele: Afib 80s-90s, up to 110s with activity    TTE 3/15:  The aortic valve is well visualized, appears mildly calcified. Valve opening seems to be normal. The ascending aorta appears to be mildly dilated. The left atrium appears moderately dilated. The left ventricle is normal in size, wall thickness, wall motion and contractility. Estimated left ventricular ejection fraction is 60 %. IVC is dilated and not collapsing with inspiration. Mild mitral annular calcification is present. There is thickening of both mitral valve leaflets. The leaflet opening is normal. Mild (1+) mitral regurgitation is present. A small pericardial effusion is present. Pleural effusion - is present. Mild to moderate pulmonic valvular regurgitation (1+) is present. The right atrium appears mildly dilated. Normal appearing right ventricle structure and function. Moderate (2+) tricuspid valve regurgitation is present.    EKG 3/14: Rate 117. Atrial fibrillation with rapid ventricular response. Right ventricular hypertrophy.    Meds:  MEDICATIONS  (STANDING):  acetaminophen     Tablet .. 1000 milliGRAM(s) Oral every 8 hours  atorvastatin 10 milliGRAM(s) Oral at bedtime  furosemide   Injectable 40 milliGRAM(s) IV Push once  influenza  Vaccine (HIGH DOSE) 0.7 milliLiter(s) IntraMuscular once  Inrebic (fedratinib) 2 Capsule(s) 2 Capsule(s) Oral daily  metoprolol succinate ER 50 milliGRAM(s) Oral daily  naloxone Injectable 0.4 milliGRAM(s) IV Push once  pantoprazole    Tablet 40 milliGRAM(s) Oral before breakfast  polyethylene glycol 3350 17 Gram(s) Oral daily  rivaroxaban 15 milliGRAM(s) Oral with dinner  senna 2 Tablet(s) Oral at bedtime  tamsulosin 0.4 milliGRAM(s) Oral at bedtime  thiamine 100 milliGRAM(s) Oral daily    MEDICATIONS  (PRN):  aluminum hydroxide/magnesium hydroxide/simethicone Suspension 30 milliLiter(s) Oral every 4 hours PRN Dyspepsia  bisacodyl 5 milliGRAM(s) Oral daily PRN Constipation  melatonin 3 milliGRAM(s) Oral at bedtime PRN Insomnia  morphine  - Injectable 4 milliGRAM(s) IV Push every 4 hours PRN Severe Pain (7 - 10)  ondansetron Injectable 4 milliGRAM(s) IV Push every 8 hours PRN Nausea and/or Vomiting  oxyCODONE    IR 5 milliGRAM(s) Oral every 4 hours PRN Mild Pain (1 - 3)  oxyCODONE    IR 10 milliGRAM(s) Oral every 4 hours PRN Moderate Pain (4 - 6)

## 2023-03-19 ENCOUNTER — TRANSCRIPTION ENCOUNTER (OUTPATIENT)
Age: 73
End: 2023-03-19

## 2023-03-19 VITALS
DIASTOLIC BLOOD PRESSURE: 54 MMHG | HEART RATE: 103 BPM | RESPIRATION RATE: 18 BRPM | OXYGEN SATURATION: 100 % | TEMPERATURE: 98 F | SYSTOLIC BLOOD PRESSURE: 100 MMHG

## 2023-03-19 LAB
ANION GAP SERPL CALC-SCNC: 4 MMOL/L — LOW (ref 5–17)
BASOPHILS # BLD AUTO: 0.4 K/UL — HIGH (ref 0–0.2)
BASOPHILS NFR BLD AUTO: 1 % — SIGNIFICANT CHANGE UP (ref 0–2)
BUN SERPL-MCNC: 47 MG/DL — HIGH (ref 7–23)
CALCIUM SERPL-MCNC: 9.1 MG/DL — SIGNIFICANT CHANGE UP (ref 8.5–10.1)
CHLORIDE SERPL-SCNC: 99 MMOL/L — SIGNIFICANT CHANGE UP (ref 96–108)
CO2 SERPL-SCNC: 34 MMOL/L — HIGH (ref 22–31)
CREAT SERPL-MCNC: 1.03 MG/DL — SIGNIFICANT CHANGE UP (ref 0.5–1.3)
EGFR: 58 ML/MIN/1.73M2 — LOW
EOSINOPHIL # BLD AUTO: 0.4 K/UL — SIGNIFICANT CHANGE UP (ref 0–0.5)
EOSINOPHIL NFR BLD AUTO: 1 % — SIGNIFICANT CHANGE UP (ref 0–6)
GLUCOSE SERPL-MCNC: 107 MG/DL — HIGH (ref 70–99)
HCT VFR BLD CALC: 23.6 % — LOW (ref 34.5–45)
HGB BLD-MCNC: 7.4 G/DL — LOW (ref 11.5–15.5)
LYMPHOCYTES # BLD AUTO: 11 % — LOW (ref 13–44)
LYMPHOCYTES # BLD AUTO: 4.4 K/UL — HIGH (ref 1–3.3)
MCHC RBC-ENTMCNC: 28 PG — SIGNIFICANT CHANGE UP (ref 27–34)
MCHC RBC-ENTMCNC: 31.4 GM/DL — LOW (ref 32–36)
MCV RBC AUTO: 89.4 FL — SIGNIFICANT CHANGE UP (ref 80–100)
MONOCYTES # BLD AUTO: 1.2 K/UL — HIGH (ref 0–0.9)
MONOCYTES NFR BLD AUTO: 3 % — SIGNIFICANT CHANGE UP (ref 2–14)
NEUTROPHILS # BLD AUTO: 31.21 K/UL — HIGH (ref 1.8–7.4)
NEUTROPHILS NFR BLD AUTO: 67 % — SIGNIFICANT CHANGE UP (ref 43–77)
NRBC # BLD: SIGNIFICANT CHANGE UP /100 WBCS (ref 0–0)
PLATELET # BLD AUTO: 981 K/UL — HIGH (ref 150–400)
POTASSIUM SERPL-MCNC: 4.9 MMOL/L — SIGNIFICANT CHANGE UP (ref 3.5–5.3)
POTASSIUM SERPL-SCNC: 4.9 MMOL/L — SIGNIFICANT CHANGE UP (ref 3.5–5.3)
RBC # BLD: 2.64 M/UL — LOW (ref 3.8–5.2)
RBC # FLD: 22.8 % — HIGH (ref 10.3–14.5)
SODIUM SERPL-SCNC: 137 MMOL/L — SIGNIFICANT CHANGE UP (ref 135–145)
WBC # BLD: 40.01 K/UL — CRITICAL HIGH (ref 3.8–10.5)
WBC # FLD AUTO: 40.01 K/UL — CRITICAL HIGH (ref 3.8–10.5)

## 2023-03-19 PROCEDURE — 99239 HOSP IP/OBS DSCHRG MGMT >30: CPT

## 2023-03-19 RX ORDER — TAMSULOSIN HYDROCHLORIDE 0.4 MG/1
1 CAPSULE ORAL
Qty: 30 | Refills: 0
Start: 2023-03-19

## 2023-03-19 RX ORDER — DIPHENHYDRAMINE HCL 50 MG
25 CAPSULE ORAL ONCE
Refills: 0 | Status: COMPLETED | OUTPATIENT
Start: 2023-03-19 | End: 2023-03-19

## 2023-03-19 RX ORDER — ACETAMINOPHEN 500 MG
650 TABLET ORAL EVERY 6 HOURS
Refills: 0 | Status: DISCONTINUED | OUTPATIENT
Start: 2023-03-19 | End: 2023-03-19

## 2023-03-19 RX ORDER — FEDRATINIB HYDROCHLORIDE 100 MG/1
2 CAPSULE ORAL
Qty: 0 | Refills: 0 | DISCHARGE

## 2023-03-19 RX ORDER — ACETAMINOPHEN 500 MG
2 TABLET ORAL
Qty: 90 | Refills: 0
Start: 2023-03-19

## 2023-03-19 RX ORDER — FUROSEMIDE 40 MG
40 TABLET ORAL ONCE
Refills: 0 | Status: COMPLETED | OUTPATIENT
Start: 2023-03-19 | End: 2023-03-19

## 2023-03-19 RX ADMIN — Medication 50 MILLIGRAM(S): at 11:33

## 2023-03-19 RX ADMIN — Medication 650 MILLIGRAM(S): at 11:33

## 2023-03-19 RX ADMIN — Medication 25 MILLIGRAM(S): at 12:24

## 2023-03-19 RX ADMIN — RIVAROXABAN 15 MILLIGRAM(S): KIT at 16:45

## 2023-03-19 RX ADMIN — Medication 650 MILLIGRAM(S): at 12:30

## 2023-03-19 RX ADMIN — Medication 40 MILLIGRAM(S): at 16:45

## 2023-03-19 RX ADMIN — Medication 100 MILLIGRAM(S): at 11:33

## 2023-03-19 NOTE — DISCHARGE NOTE PROVIDER - NSDCMRMEDTOKEN_GEN_ALL_CORE_FT
acetaminophen 325 mg oral tablet: 2 tab every 6 hrs as needed for pain  atorvastatin 10 mg oral tablet: 1 tab(s) orally once a day  cyanocobalamin 1000 mcg/mL injectable solution: injectable once a month  ***past due, scheduled for tomorrow at Hematologist***  fedratinib 100 mg oral capsule: 2 cap(s) orally once a day    Metoprolol Succinate ER 50 mg oral tablet, extended release: 1 tab(s) orally once a day  omeprazole 20 mg oral delayed release capsule: 1 cap(s) orally 2 times a day, As Needed  rivaroxaban 15 mg oral tablet: 1 tab(s) orally once a day (before a meal)  tamsulosin 0.4 mg oral capsule: 1 cap(s) orally once a day (at bedtime)  thiamine 100 mg oral tablet: 1 tab(s) orally once a day with Inrebic

## 2023-03-19 NOTE — DISCHARGE NOTE PROVIDER - CARE PROVIDERS DIRECT ADDRESSES
,DirectAddress_Unknown,DirectAddress_Unknown,[email or web address],DirectAddress_Unknown

## 2023-03-19 NOTE — DISCHARGE NOTE PROVIDER - NSDCCAREPROVSEEN_GEN_ALL_CORE_FT
Nazario Solorzano (Hematology)  Jesus Jeter (Hematology)  Pablo Aquino (Cardiology)  Juvencio Gusman (Nephrology)  Ilya Valdez (Urology)  Vanessa Batista (Spine Surgery)  Palla, Venugopal R (Cardiology)  Roberto Pisano ([facility] Medicine)  Roberto Ku ([facility] Medicine)  Farooq Hoyos (Nephrology)

## 2023-03-19 NOTE — DISCHARGE NOTE PROVIDER - PROVIDER TOKENS
PROVIDER:[TOKEN:[[id number]],FOLLOWUP:[2 weeks]],PROVIDER:[TOKEN:[[id number]],FOLLOWUP:[2 weeks]],PROVIDER:[TOKEN:[[id number]],FOLLOWUP:[2 weeks]],FREE:[LAST:[Mitch],FIRST:[Ernesto],PHONE:[phone]],FAX:[(   )    -],ADDRESS:[[address] [place] [address]],SCHEDULEDAPPT:[03/20/2023]]

## 2023-03-19 NOTE — DISCHARGE NOTE PROVIDER - NSDCCPCAREPLAN_GEN_ALL_CORE_FT
PRINCIPAL DISCHARGE DIAGNOSIS  Diagnosis: Atrial fibrillation with RVR  Assessment and Plan of Treatment: You were treated in the hospital for new-onset rapid atrial fibrillation, likely multi factorial in the setting of acute flank pain from nephroureterolithiasis (that is, kidney stones, see below) and back pain from the L3 vertebral compression deformity (see below). You were treated with metoprolol for heart rate control. You were started on Xarelto for stroke risk-reduction. The underlying issues were addressed. You have since passed kidney stones and pain is now improve, both related to the stones and your back / fall. Heart ratae has improved, though you remain in atrial fibrillation. Continue on metoprolol succinate. Continue Xarelto. Follow up with Cardiology Dr. Khris Osorio in [place].      SECONDARY DISCHARGE DIAGNOSES  Diagnosis: Compression fracture of L3 vertebra  Assessment and Plan of Treatment: You had a fall and you were found to have a lumbar vertebral compression deformity. You were seen by Spine Surgery Dr. Batista. No need for surgical intervention. Maintain in LSO brace when out-of-bed. Continue physical therapy sessions, pain control medication as needed. Follow up with Dr. Batista as outpatient.    Diagnosis: Ureteral stone with hydronephrosis  Assessment and Plan of Treatment: Right ureteral stone w/ mild right hydronephrosis  Passed stones here. Appreciate input from Urology. No fever. No rigors. Patient did receive ceftriaxone x 3 days. Urine culture > 3 morphotypes. Likely contaminated. Continue pain control as needed. Continue Flomax. Outpatient Urology f/u with Dr. Valdez.    Diagnosis: Hyponatremia  Assessment and Plan of Treatment: Hyponatremia  Possibly hypervolemic vs SIADH 2/2 pain. Na improved with fluid restriction and pain control. Na now WNL, 137. Continue to limit fluid intake to ~1 to 1.5 liters per day.       Diagnosis: Hyperkalemia  Assessment and Plan of Treatment: Hyperkalemia  K ~5. Stable. Continue to monitor      Diagnosis: Myelofibrosis  Assessment and Plan of Treatment: Myelofibrosis  Appreciated Heme Onc input, no contraindication to full a/c, resumed GABRIELA Kinase therapy, Inrebic 200mg qd. Continue Inrebic. Transfused 2u PRBC this admission for anemia Hgb ~7.      Diagnosis: Pulmonary nodule  Assessment and Plan of Treatment: Pulm nodule  As noted on CT. Outpatient surveillance CT advised.    Diagnosis: Chronic respiratory failure with hypoxia  Assessment and Plan of Treatment: Chronic hypoxic respiratory failure, present prior to admission  As noted when she was recently admitted for pneumonia at [facility], discharged home on O2, still has O2. Continue home O2 as needed    Diagnosis: Physical deconditioning  Assessment and Plan of Treatment: Deconditioning, debility  Seen by PT, recommended home with home PT     PRINCIPAL DISCHARGE DIAGNOSIS  Diagnosis: Atrial fibrillation with RVR  Assessment and Plan of Treatment: You were treated in the hospital for new-onset rapid atrial fibrillation, likely multi factorial in the setting of acute flank pain from nephroureterolithiasis (that is, kidney stones, see below) and back pain from the L3 vertebral compression deformity (see below). You were treated with metoprolol for heart rate control. You were started on Xarelto for stroke risk-reduction. The underlying issues were addressed. You have since passed kidney stones and pain is now improve, both related to the stones and your back / fall. Heart ratae has improved, though you remain in atrial fibrillation. Continue on metoprolol succinate. Continue Xarelto. Follow up with Cardiology Dr. Khris Osorio in [place].      SECONDARY DISCHARGE DIAGNOSES  Diagnosis: Compression fracture of L3 vertebra  Assessment and Plan of Treatment: You had a fall and you were found to have a lumbar vertebral compression deformity. You were seen by Spine Surgery Dr. Batista. No need for surgical intervention. Maintain in LSO brace when out-of-bed. Continue physical therapy sessions, pain control medication as needed. Follow up with Dr. Batista as outpatient.    Diagnosis: Ureteral stone with hydronephrosis  Assessment and Plan of Treatment: You had right sided ureteral stone. Appreciate input from Urology. No fever. No rigors. You did received several days of IV antbiotics. You were treated with Flomax and you passed stones here. Flank pain has improved. Please continue on Flomax for now and follow up with Urology Dr. Valdez as outpatient.    Diagnosis: Hyponatremia  Assessment and Plan of Treatment: Low sodium concentration in blood. Possibly due to pain. Sodium concentration has since normalized with controlling your pain and limiting your fluid intake. Continue to limit fluid intake to ~1 to 1.5 liters per day.    Diagnosis: Hyperkalemia  Assessment and Plan of Treatment: You had mildly elevated potassium levels, now normal.    Diagnosis: Myelofibrosis  Assessment and Plan of Treatment: Continue Inrebic. Continue thiamine. Note that you received 2 units packed red blood cell transfusion for anemia. Follow up with your hematologist tomorrow as scheduled       Diagnosis: Pulmonary nodule  Assessment and Plan of Treatment: Small pulmonary nodule noted on CT chest. Advise outpatient surveillance CT. Can see your primary care provider or your hematologist to arrange.    Diagnosis: Chronic respiratory failure with hypoxia  Assessment and Plan of Treatment: Related to your recent pneumonia though you may also have a component of chronic diastolic congestive heart failure. Heart squeezing function was normal on echocardiogram. Contnue oxygen supplementation as needed.    Diagnosis: Physical deconditioning  Assessment and Plan of Treatment: You were seen by Physical Therapy, recommended home with home therapy. Referred for such.

## 2023-03-19 NOTE — DISCHARGE NOTE PROVIDER - HOSPITAL COURSE
71 yo woman with myelofibrosis w/ chronic leukocytosis anemia and thrombocytosis on aspirin, recently admitted at [facility] for pneumonia, discharged with home O2, presented here 3/14 for further evaluation of R flank pain and back pain 1 day after a fall. In the ED, she was noted to have new-onset rapid afib, hyponatremia, hyperkalemia, CT imaging findings of small-to-moderate pericardial effusion, cardiomegaly, pulmonary edema, small bilateral pleural effusions, hepatosplenomegaly with 2 splenic infarcts, a small R ureteral stone with mild right hydronephrosis, and an acute L3 compression fracture. Admitted to Medicine.    New-onset atrial fibrillation with RVR  Likely multi factorial in the setting of acute flank pain from nephroureterolithiasis (see below) and back pain from the L3 compression deformity. Patient was treated with metoprolol for rate control. Started on Xarelto for stroke risk reduction. Patient has since passed stones and pain is now improve. HR has improved, though she does remain in afib. Continue on metoprolol succinate. Continue Xarelto. Follow up with Cardiology Dr. Khris Osorio in [place].     Fall with acute L3 compression fracture with retropulsion  Appreciate input from Spine Surgery. No need for surgical intervention. Maintain in LSO brace when OOB. Pain control as needed. PT/OT. Pain now better controlled. Ambulating in hallway with rollator. Stable for DC with home PT.      Right ureteral stone w/ mild right hydronephrosis  Passed stones here. Appreciate input from Urology. No fever. No rigors. Patient did receive ceftriaxone x 3 days. Urine culture > 3 morphotypes. Likely contaminated. Continue pain control as needed. Continue Flomax. Outpatient Urology f/u with Dr. Valdez.    Hyponatremia  Possibly hypervolemic vs SIADH 2/2 pain. Na improved with fluid restriction and pain control. Na now WNL, 137. Continue to limit fluid intake to ~1 to 1.5 liters per day.     Hyperkalemia  K ~5. Stable. Continue to monitor    Myelofibrosis  Appreciated Heme Onc input, no contraindication to full a/c, resumed GABRIELA Kinase therapy, Inrebic 200mg qd. Continue Inrebic. Transfused 2u PRBC this admission for anemia Hgb ~7.    Pulm nodule  As noted on CT. Outpatient surveillance CT advised.    Chronic hypoxic respiratory failure, present prior to admission  As noted when she was recently admitted for pneumonia at [facility], discharged home on O2, still has O2. Continue home O2 as needed    Deconditioning, debility  Seen by PT, recommended home with home PT      Discharge Exam:  Afebrile BP 110s/40s-50s  HR 90s  RR 18  O2 99% on 2L/min  Gen: Comfortable appearing  HEENT: NCAT PERRL EOMI MMM clear oropharynx  Neck: Supple, no JVD no LAD  Chest: Normal resp effort, lungs CTA B/L  CVS: s1 s2 normal RRR  Abd: +BS soft NT ND   Ext: No edema or calf tenderness  Skin: Warm, dry  Mood: Calm, pleasant  Neuro: A+Ox3, speech fluent, no aphasia, repetition and naming intact, CN intact, strength and sensation intact, reflexes symmetric and normal, proprioception intact, no ataxia    Labs:                              7.4    40.01 )------( 981             23.6       137  |  99  |  47  --------------------< 107  4.9   |  34  |  1.03    Ca 9.1    a1c 5.4  TSH 3.96   Lactate WNL  Troponin negative  proBNP 4152    Micro:  COVID19 PCR 3/14: negative  Flu PCR 3/14: negative  RSV PCR 3/14: negative  Urine culture 3/14: negative    Imaging:  CT abd/pelvis W/ 3/14: Massive hepatosplenomegaly. 2 wedge-shaped foci of hypoattenuation in the spleen for which the differential includes age indeterminant infarct and laceration. Infarct is favored given the lack of perisplenic hemorrhage. Distal right ureteral stone with mild right hydronephrosis. Acute mild superior endplate compression fracture of L3 with minimal bony retropulsion.    CT chest W/ 3/14: Confluent pulmonary groundglass opacities with peripheral sparing as well as bilateral pleural effusions and small to moderate pericardial effusion in the setting of cardiomegaly. Findings may represent degenerative pulmonary edema. Differential for the pulmonary opacities is inflammatory infectious etiologies. Incidental note of a 5 mm right upper lobe pulmonary nodule.     CT L spine WO 3/11: Scoliosis with multilevel degenerative changes. No evidence of an acute lumbar spine fracture.    CT C spine WO 3/11: No acute fracture.    CT head WO 3/11:No CT evidence of acute intracranial hemorrhage, mass or large territorial infarction.    Cardiac Testing:  Tele: Afib 80s-90s, up to 110s with activity    TTE 3/15:  The aortic valve is well visualized, appears mildly calcified. Valve opening seems to be normal. The ascending aorta appears to be mildly dilated. The left atrium appears moderately dilated. The left ventricle is normal in size, wall thickness, wall motion and contractility. Estimated left ventricular ejection fraction is 60 %. IVC is dilated and not collapsing with inspiration. Mild mitral annular calcification is present. There is thickening of both mitral valve leaflets. The leaflet opening is normal. Mild (1+) mitral regurgitation is present. A small pericardial effusion is present. Pleural effusion - is present. Mild to moderate pulmonic valvular regurgitation (1+) is present. The right atrium appears mildly dilated. Normal appearing right ventricle structure and function. Moderate (2+) tricuspid valve regurgitation is present.    EKG 3/14: Rate 117. Atrial fibrillation with rapid ventricular response. Right ventricular hypertrophy.

## 2023-03-19 NOTE — DISCHARGE NOTE NURSING/CASE MANAGEMENT/SOCIAL WORK - PATIENT PORTAL LINK FT
You can access the FollowMyHealth Patient Portal offered by [facility] by registering at the following website: [email or web address]. By joining [facility]’s FollowMyHealth portal, you will also be able to view your health information using other applications (apps) compatible with our system.

## 2023-03-19 NOTE — DISCHARGE NOTE NURSING/CASE MANAGEMENT/SOCIAL WORK - NSDCPEFALRISK_GEN_ALL_CORE
For information on Fall & Injury Prevention, visit: [email or web address] OR  [email or web address] OR  https://www.cdc.gov/steadi/patient.html

## 2023-03-19 NOTE — DISCHARGE NOTE PROVIDER - REASON FOR ADMISSION
Fall with back pain and flank pain, acute L3 compression deformity, R ureteral stone, new-onset atrial fibrillation with rapid ventricular response, hyponatremia and hyperkalemia.  Fall with back pain and flank pain, acute L3 compression deformity, R ureteral stone, new-onset atrial fibrillation with rapid ventricular response, hyponatremia and hyperkalemia.

## 2023-03-19 NOTE — DISCHARGE NOTE PROVIDER - CARE PROVIDER_API CALL
Khris Osorio)  Cardiology  [address] [place] [address]  Phone: [phone]  Fax: [phone]  Follow Up Time: 2 weeks    Vanessa Batista)  Orthopaedic Surgery  [address] [facility], [address] [place] [address]  Phone: [phone]  Fax: [phone]  Follow Up Time: 2 weeks    Ilya Valdez)  Urology  [address] [facility], [address] [place] [address]  Phone: [phone]  Fax: [phone]  Follow Up Time: 2 weeks    Ernesto Meyer  [address] [facility] [address]  Phone: [phone]  Fax: (   )    -  Scheduled Appointment: 03/20/2023

## 2023-03-20 LAB — MANUAL DIF COMMENT BLD-IMP: SIGNIFICANT CHANGE UP

## 2023-03-21 PROBLEM — D75.81 MYELOFIBROSIS: Chronic | Status: ACTIVE | Noted: 2023-03-14

## 2023-03-21 PROBLEM — J18.9 PNEUMONIA, UNSPECIFIED ORGANISM: Chronic | Status: ACTIVE | Noted: 2023-03-14

## 2023-03-21 PROBLEM — D64.9 ANEMIA, UNSPECIFIED: Chronic | Status: ACTIVE | Noted: 2023-03-14

## 2023-03-23 ENCOUNTER — INPATIENT (INPATIENT)
Facility: HOSPITAL | Age: 73
LOS: 5 days | Discharge: HOME | DRG: 292 | End: 2023-03-29
Attending: HOSPITALIST | Admitting: INTERNAL MEDICINE
Payer: MEDICARE

## 2023-03-23 VITALS — WEIGHT: 121.92 LBS | HEIGHT: 60 IN

## 2023-03-23 DIAGNOSIS — E87.5 HYPERKALEMIA: ICD-10-CM

## 2023-03-23 DIAGNOSIS — S32.039A UNSPECIFIED FRACTURE OF THIRD LUMBAR VERTEBRA, INITIAL ENCOUNTER FOR CLOSED FRACTURE: ICD-10-CM

## 2023-03-23 DIAGNOSIS — R91.1 SOLITARY PULMONARY NODULE: ICD-10-CM

## 2023-03-23 DIAGNOSIS — I50.32 CHRONIC DIASTOLIC (CONGESTIVE) HEART FAILURE: ICD-10-CM

## 2023-03-23 DIAGNOSIS — E87.1 HYPO-OSMOLALITY AND HYPONATREMIA: ICD-10-CM

## 2023-03-23 DIAGNOSIS — Z87.01 PERSONAL HISTORY OF PNEUMONIA (RECURRENT): ICD-10-CM

## 2023-03-23 DIAGNOSIS — D75.81 MYELOFIBROSIS: ICD-10-CM

## 2023-03-23 DIAGNOSIS — N13.2 HYDRONEPHROSIS WITH RENAL AND URETERAL CALCULOUS OBSTRUCTION: ICD-10-CM

## 2023-03-23 DIAGNOSIS — I31.39 OTHER PERICARDIAL EFFUSION (NONINFLAMMATORY): ICD-10-CM

## 2023-03-23 DIAGNOSIS — Z79.82 LONG TERM (CURRENT) USE OF ASPIRIN: ICD-10-CM
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
## 2023-03-23 DIAGNOSIS — D64.9 ANEMIA, UNSPECIFIED: ICD-10-CM

## 2023-03-23 DIAGNOSIS — R16.2 HEPATOMEGALY WITH SPLENOMEGALY, NOT ELSEWHERE CLASSIFIED: ICD-10-CM

## 2023-03-23 DIAGNOSIS — J96.21 ACUTE AND CHRONIC RESPIRATORY FAILURE WITH HYPOXIA: ICD-10-CM

## 2023-03-23 DIAGNOSIS — I50.9 HEART FAILURE, UNSPECIFIED: ICD-10-CM

## 2023-03-23 DIAGNOSIS — D69.59 OTHER SECONDARY THROMBOCYTOPENIA: ICD-10-CM

## 2023-03-23 DIAGNOSIS — I48.91 UNSPECIFIED ATRIAL FIBRILLATION: ICD-10-CM
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
## 2023-03-23 DIAGNOSIS — D73.5 INFARCTION OF SPLEEN: ICD-10-CM

## 2023-03-23 DIAGNOSIS — N17.9 ACUTE KIDNEY FAILURE, UNSPECIFIED: ICD-10-CM

## 2023-03-23 LAB
ADD ON TEST-SPECIMEN IN LAB: SIGNIFICANT CHANGE UP
ADD ON TEST-SPECIMEN IN LAB: SIGNIFICANT CHANGE UP
ALBUMIN SERPL ELPH-MCNC: 3 G/DL — LOW (ref 3.3–5)
ALP SERPL-CCNC: 207 U/L — HIGH (ref 40–120)
ALT FLD-CCNC: 33 U/L — SIGNIFICANT CHANGE UP (ref 12–78)
ANION GAP SERPL CALC-SCNC: 1 MMOL/L — LOW (ref 5–17)
ANISOCYTOSIS BLD QL: SIGNIFICANT CHANGE UP
APPEARANCE UR: CLEAR — SIGNIFICANT CHANGE UP
AST SERPL-CCNC: 31 U/L — SIGNIFICANT CHANGE UP (ref 15–37)
BACTERIA # UR AUTO: ABNORMAL
BASOPHILS # BLD AUTO: 0 K/UL — SIGNIFICANT CHANGE UP (ref 0–0.2)
BASOPHILS NFR BLD AUTO: 0 % — SIGNIFICANT CHANGE UP (ref 0–2)
BILIRUB SERPL-MCNC: 0.3 MG/DL — SIGNIFICANT CHANGE UP (ref 0.2–1.2)
BILIRUB UR-MCNC: NEGATIVE — SIGNIFICANT CHANGE UP
BUN SERPL-MCNC: 38 MG/DL — HIGH (ref 7–23)
CALCIUM SERPL-MCNC: 8.7 MG/DL — SIGNIFICANT CHANGE UP (ref 8.5–10.1)
CHLORIDE SERPL-SCNC: 102 MMOL/L — SIGNIFICANT CHANGE UP (ref 96–108)
CO2 SERPL-SCNC: 35 MMOL/L — HIGH (ref 22–31)
COLOR SPEC: YELLOW — SIGNIFICANT CHANGE UP
CREAT SERPL-MCNC: 0.78 MG/DL — SIGNIFICANT CHANGE UP (ref 0.5–1.3)
DIFF PNL FLD: ABNORMAL
EGFR: 81 ML/MIN/1.73M2 — SIGNIFICANT CHANGE UP
ELLIPTOCYTES BLD QL SMEAR: SLIGHT — SIGNIFICANT CHANGE UP
EOSINOPHIL # BLD AUTO: 0.41 K/UL — SIGNIFICANT CHANGE UP (ref 0–0.5)
EOSINOPHIL NFR BLD AUTO: 1 % — SIGNIFICANT CHANGE UP (ref 0–6)
EPI CELLS # UR: SIGNIFICANT CHANGE UP
GLUCOSE SERPL-MCNC: 142 MG/DL — HIGH (ref 70–99)
GLUCOSE UR QL: NEGATIVE — SIGNIFICANT CHANGE UP
HCT VFR BLD CALC: 25.6 % — LOW (ref 34.5–45)
HGB BLD-MCNC: 7.7 G/DL — LOW (ref 11.5–15.5)
KETONES UR-MCNC: NEGATIVE — SIGNIFICANT CHANGE UP
LEUKOCYTE ESTERASE UR-ACNC: ABNORMAL
LG PLATELETS BLD QL AUTO: SLIGHT — SIGNIFICANT CHANGE UP
LIDOCAIN IGE QN: 752 U/L — HIGH (ref 73–393)
LYMPHOCYTES # BLD AUTO: 3.27 K/UL — SIGNIFICANT CHANGE UP (ref 1–3.3)
LYMPHOCYTES # BLD AUTO: 8 % — LOW (ref 13–44)
MACROCYTES BLD QL: SLIGHT — SIGNIFICANT CHANGE UP
MANUAL SMEAR VERIFICATION: SIGNIFICANT CHANGE UP
MCHC RBC-ENTMCNC: 27.1 PG — SIGNIFICANT CHANGE UP (ref 27–34)
MCHC RBC-ENTMCNC: 30.1 GM/DL — LOW (ref 32–36)
MCV RBC AUTO: 90.1 FL — SIGNIFICANT CHANGE UP (ref 80–100)
METAMYELOCYTES # FLD: 3 % — HIGH (ref 0–0)
MICROCYTES BLD QL: SLIGHT — SIGNIFICANT CHANGE UP
MONOCYTES # BLD AUTO: 1.22 K/UL — HIGH (ref 0–0.9)
MONOCYTES NFR BLD AUTO: 3 % — SIGNIFICANT CHANGE UP (ref 2–14)
MYELOCYTES NFR BLD: 5 % — HIGH (ref 0–0)
NEUTROPHILS # BLD AUTO: 32.66 K/UL — HIGH (ref 1.8–7.4)
NEUTROPHILS NFR BLD AUTO: 73 % — SIGNIFICANT CHANGE UP (ref 43–77)
NEUTS BAND # BLD: 7 % — SIGNIFICANT CHANGE UP (ref 0–8)
NITRITE UR-MCNC: NEGATIVE — SIGNIFICANT CHANGE UP
NRBC # BLD: 0 /100 — SIGNIFICANT CHANGE UP (ref 0–0)
NRBC # BLD: SIGNIFICANT CHANGE UP /100 WBCS (ref 0–0)
NT-PROBNP SERPL-SCNC: 3299 PG/ML — HIGH (ref 0–125)
OVALOCYTES BLD QL SMEAR: SIGNIFICANT CHANGE UP
PH UR: 5 — SIGNIFICANT CHANGE UP (ref 5–8)
PLAT MORPH BLD: NORMAL — SIGNIFICANT CHANGE UP
PLATELET # BLD AUTO: 780 K/UL — HIGH (ref 150–400)
POIKILOCYTOSIS BLD QL AUTO: SLIGHT — SIGNIFICANT CHANGE UP
POLYCHROMASIA BLD QL SMEAR: SLIGHT — SIGNIFICANT CHANGE UP
POTASSIUM SERPL-MCNC: 5.1 MMOL/L — SIGNIFICANT CHANGE UP (ref 3.5–5.3)
POTASSIUM SERPL-SCNC: 5.1 MMOL/L — SIGNIFICANT CHANGE UP (ref 3.5–5.3)
PROT SERPL-MCNC: 7.2 GM/DL — SIGNIFICANT CHANGE UP (ref 6–8.3)
PROT UR-MCNC: 30 MG/DL
RBC # BLD: 2.84 M/UL — LOW (ref 3.8–5.2)
RBC # FLD: 20.7 % — HIGH (ref 10.3–14.5)
RBC BLD AUTO: ABNORMAL
RBC CASTS # UR COMP ASSIST: >50 /HPF (ref 0–4)
SODIUM SERPL-SCNC: 138 MMOL/L — SIGNIFICANT CHANGE UP (ref 135–145)
SP GR SPEC: 1.01 — SIGNIFICANT CHANGE UP (ref 1.01–1.02)
STOMATOCYTES BLD QL SMEAR: SLIGHT — SIGNIFICANT CHANGE UP
URATE CRY FLD QL MICRO: ABNORMAL
UROBILINOGEN FLD QL: NEGATIVE — SIGNIFICANT CHANGE UP
WBC # BLD: 40.82 K/UL — CRITICAL HIGH (ref 3.8–10.5)
WBC # FLD AUTO: 40.82 K/UL — CRITICAL HIGH (ref 3.8–10.5)
WBC UR QL: ABNORMAL /HPF (ref 0–5)

## 2023-03-23 PROCEDURE — 86902 BLOOD TYPE ANTIGEN DONOR EA: CPT

## 2023-03-23 PROCEDURE — 80053 COMPREHEN METABOLIC PANEL: CPT

## 2023-03-23 PROCEDURE — 86850 RBC ANTIBODY SCREEN: CPT

## 2023-03-23 PROCEDURE — 36415 COLL VENOUS BLD VENIPUNCTURE: CPT

## 2023-03-23 PROCEDURE — 86921 COMPATIBILITY TEST INCUBATE: CPT

## 2023-03-23 PROCEDURE — 86880 COOMBS TEST DIRECT: CPT

## 2023-03-23 PROCEDURE — 80048 BASIC METABOLIC PNL TOTAL CA: CPT

## 2023-03-23 PROCEDURE — 85014 HEMATOCRIT: CPT

## 2023-03-23 PROCEDURE — 85018 HEMOGLOBIN: CPT

## 2023-03-23 PROCEDURE — P9016: CPT

## 2023-03-23 PROCEDURE — 71045 X-RAY EXAM CHEST 1 VIEW: CPT | Mod: 26

## 2023-03-23 PROCEDURE — 99285 EMERGENCY DEPT VISIT HI MDM: CPT

## 2023-03-23 PROCEDURE — 86920 COMPATIBILITY TEST SPIN: CPT

## 2023-03-23 PROCEDURE — 86905 BLOOD TYPING RBC ANTIGENS: CPT

## 2023-03-23 PROCEDURE — 83880 ASSAY OF NATRIURETIC PEPTIDE: CPT

## 2023-03-23 PROCEDURE — 86900 BLOOD TYPING SEROLOGIC ABO: CPT

## 2023-03-23 PROCEDURE — 86901 BLOOD TYPING SEROLOGIC RH(D): CPT

## 2023-03-23 PROCEDURE — 86922 COMPATIBILITY TEST ANTIGLOB: CPT

## 2023-03-23 PROCEDURE — 0241U: CPT

## 2023-03-23 PROCEDURE — 36430 TRANSFUSION BLD/BLD COMPNT: CPT

## 2023-03-23 PROCEDURE — 83690 ASSAY OF LIPASE: CPT

## 2023-03-23 PROCEDURE — 85027 COMPLETE CBC AUTOMATED: CPT

## 2023-03-23 PROCEDURE — 74176 CT ABD & PELVIS W/O CONTRAST: CPT | Mod: 26,MA

## 2023-03-23 RX ORDER — MORPHINE SULFATE 50 MG/1
4 CAPSULE, EXTENDED RELEASE ORAL ONCE
Refills: 0 | Status: DISCONTINUED | OUTPATIENT
Start: 2023-03-23 | End: 2023-03-23

## 2023-03-23 RX ORDER — FUROSEMIDE 40 MG
40 TABLET ORAL ONCE
Refills: 0 | Status: COMPLETED | OUTPATIENT
Start: 2023-03-23 | End: 2023-03-23

## 2023-03-23 RX ORDER — PREGABALIN 225 MG/1
0 CAPSULE ORAL
Qty: 0 | Refills: 0 | DISCHARGE

## 2023-03-23 RX ORDER — KETOROLAC TROMETHAMINE 30 MG/ML
30 SYRINGE (ML) INJECTION ONCE
Refills: 0 | Status: DISCONTINUED | OUTPATIENT
Start: 2023-03-23 | End: 2023-03-23

## 2023-03-23 RX ORDER — THIAMINE MONONITRATE (VIT B1) 100 MG
1 TABLET ORAL
Qty: 0 | Refills: 0 | DISCHARGE

## 2023-03-23 RX ORDER — CEFTRIAXONE 500 MG/1
1000 INJECTION, POWDER, FOR SOLUTION INTRAMUSCULAR; INTRAVENOUS ONCE
Refills: 0 | Status: DISCONTINUED | OUTPATIENT
Start: 2023-03-23 | End: 2023-03-23

## 2023-03-23 RX ORDER — CEFTRIAXONE 500 MG/1
1000 INJECTION, POWDER, FOR SOLUTION INTRAMUSCULAR; INTRAVENOUS ONCE
Refills: 0 | Status: COMPLETED | OUTPATIENT
Start: 2023-03-23 | End: 2023-03-23

## 2023-03-23 RX ORDER — SODIUM CHLORIDE 9 MG/ML
1000 INJECTION INTRAMUSCULAR; INTRAVENOUS; SUBCUTANEOUS ONCE
Refills: 0 | Status: COMPLETED | OUTPATIENT
Start: 2023-03-23 | End: 2023-03-23

## 2023-03-23 RX ORDER — OMEPRAZOLE 10 MG/1
1 CAPSULE, DELAYED RELEASE ORAL
Qty: 0 | Refills: 0 | DISCHARGE

## 2023-03-23 RX ADMIN — Medication 30 MILLIGRAM(S): at 21:48

## 2023-03-23 RX ADMIN — SODIUM CHLORIDE 1000 MILLILITER(S): 9 INJECTION INTRAMUSCULAR; INTRAVENOUS; SUBCUTANEOUS at 19:37

## 2023-03-23 RX ADMIN — Medication 40 MILLIGRAM(S): at 21:42

## 2023-03-23 RX ADMIN — MORPHINE SULFATE 4 MILLIGRAM(S): 50 CAPSULE, EXTENDED RELEASE ORAL at 20:07

## 2023-03-23 RX ADMIN — CEFTRIAXONE 1000 MILLIGRAM(S): 500 INJECTION, POWDER, FOR SOLUTION INTRAMUSCULAR; INTRAVENOUS at 21:42

## 2023-03-23 NOTE — ED PROVIDER NOTE - CLINICAL SUMMARY MEDICAL DECISION MAKING FREE TEXT BOX
Elderly female with a history of kidney stones, anemia, PNA, myelofibrosis, Afib on Xarelto presents to the ED c/o b/l lower flank pain radiating to the groin. Pt also c/o b/l foot swelling, likely CHF exacerbation and possible renal colic. Will obtain blood work, imaging, reassess.

## 2023-03-23 NOTE — ED ADULT TRIAGE NOTE - CHIEF COMPLAINT QUOTE
Patient presents to the ER with complaints of abdominal pain. Patient was just d/c from  on Sunday for a fall, back pain, and kidney stones. Patient states abdominal pain started today, back pain worsened, bilateral leg swelling worsening. Patient on 2L NC at home. Patient denies CP/SOB.

## 2023-03-23 NOTE — ED PROVIDER NOTE - OBJECTIVE STATEMENT
71 yo female with a PMHx of anemia, PNA, myelofibrosis, Afib on Xarelto presents to the ER with complaints of diffuse abdominal pain radiates from back. Patient was just d/c from  on Sunday for a fall, back pain, and kidney stones. Patient states abdominal pain started today, back pain worsened. Pt also c/o bilateral feet swelling worsening. Patient on 2L NC at home for recent PNA. Cardiologist: Dr. Osorio

## 2023-03-23 NOTE — ED ADULT NURSE NOTE - URINE CHARACTERISTICS
Subjective:    HPI                    Objective:    System    Physical Exam    General     ROS    Assessment/Plan:      {REHAB NOTES:[id number]}           clear

## 2023-03-23 NOTE — PHARMACOTHERAPY INTERVENTION NOTE - COMMENTS
Medication history complete, reviewed medications with patient family member at bedside and confirmed with doctor first med hx.

## 2023-03-23 NOTE — ED PROVIDER NOTE - NS ED ROS FT
Constitutional: No fever or chills  Eyes: No visual changes  HEENT: No throat pain  CV: No chest pain  Resp: No SOB no cough  GI: +Abd pain, nausea or vomiting  : No dysuria  MSK: +Back pain  Skin: No rash  Neuro: No headache

## 2023-03-23 NOTE — ED PROVIDER NOTE - PHYSICAL EXAMINATION
Constitutional: NAD AAOx3  Eyes: PERRLA EOMI  Head: Normocephalic atraumatic  Mouth: MMM  Cardiac: regular rate   Resp: Lungs CTAB  GI: Left upper abd minimally tender  : b/l CVA tenderness,   Neuro: awake, alert, moving all extremities, cranial nerves 2-12 intact, sensation intact, no dysmetria.  Skin: No rashes

## 2023-03-23 NOTE — ED ADULT NURSE NOTE - BREATHING, MLM
Spontaneous, unlabored and symmetrical
Pt has CKD, but NL lytes. Case discussed w/ Dr. Simmons- agreed w/ plan for dc w/ fup with her and w/ Dr. Burns for cont wup and management. Discussed all available results.  Pt and/ or family understand results, plan of care, outpt follow up as discussed, and signs and symptoms for ED return.  Pt understands long term risk of not following up, including worsening renal failure. Pt/ family is comfortable with discharge.

## 2023-03-23 NOTE — ED ADULT NURSE NOTE - OBJECTIVE STATEMENT
PT C/O OF LOWER ABD PAIN AND BILATERAL FLANK PAIN SINCE THIS MORNING. HX KIDNEY STONES   BILATERAL LEG SWELLING AND EDEMA NOTED. independent

## 2023-03-24 LAB
ALBUMIN SERPL ELPH-MCNC: 2.8 G/DL — LOW (ref 3.3–5)
ALP SERPL-CCNC: 199 U/L — HIGH (ref 40–120)
ALT FLD-CCNC: 32 U/L — SIGNIFICANT CHANGE UP (ref 12–78)
ANION GAP SERPL CALC-SCNC: 2 MMOL/L — LOW (ref 5–17)
AST SERPL-CCNC: 25 U/L — SIGNIFICANT CHANGE UP (ref 15–37)
BILIRUB SERPL-MCNC: 0.3 MG/DL — SIGNIFICANT CHANGE UP (ref 0.2–1.2)
BUN SERPL-MCNC: 41 MG/DL — HIGH (ref 7–23)
CALCIUM SERPL-MCNC: 8.4 MG/DL — LOW (ref 8.5–10.1)
CHLORIDE SERPL-SCNC: 104 MMOL/L — SIGNIFICANT CHANGE UP (ref 96–108)
CO2 SERPL-SCNC: 33 MMOL/L — HIGH (ref 22–31)
CREAT SERPL-MCNC: 1.05 MG/DL — SIGNIFICANT CHANGE UP (ref 0.5–1.3)
CULTURE RESULTS: SIGNIFICANT CHANGE UP
EGFR: 56 ML/MIN/1.73M2 — LOW
FLUAV AG NPH QL: SIGNIFICANT CHANGE UP
FLUBV AG NPH QL: SIGNIFICANT CHANGE UP
GLUCOSE SERPL-MCNC: 111 MG/DL — HIGH (ref 70–99)
HCT VFR BLD CALC: 25.1 % — LOW (ref 34.5–45)
HGB BLD-MCNC: 7.5 G/DL — LOW (ref 11.5–15.5)
LIDOCAIN IGE QN: 592 U/L — HIGH (ref 73–393)
MCHC RBC-ENTMCNC: 27.6 PG — SIGNIFICANT CHANGE UP (ref 27–34)
MCHC RBC-ENTMCNC: 29.9 GM/DL — LOW (ref 32–36)
MCV RBC AUTO: 92.3 FL — SIGNIFICANT CHANGE UP (ref 80–100)
PLATELET # BLD AUTO: 791 K/UL — HIGH (ref 150–400)
POTASSIUM SERPL-MCNC: 5.4 MMOL/L — HIGH (ref 3.5–5.3)
POTASSIUM SERPL-SCNC: 5.4 MMOL/L — HIGH (ref 3.5–5.3)
PROT SERPL-MCNC: 7 GM/DL — SIGNIFICANT CHANGE UP (ref 6–8.3)
RBC # BLD: 2.72 M/UL — LOW (ref 3.8–5.2)
RBC # FLD: 21 % — HIGH (ref 10.3–14.5)
RSV RNA NPH QL NAA+NON-PROBE: SIGNIFICANT CHANGE UP
SARS-COV-2 RNA SPEC QL NAA+PROBE: SIGNIFICANT CHANGE UP
SODIUM SERPL-SCNC: 139 MMOL/L — SIGNIFICANT CHANGE UP (ref 135–145)
SPECIMEN SOURCE: SIGNIFICANT CHANGE UP
WBC # BLD: 43.14 K/UL — CRITICAL HIGH (ref 3.8–10.5)
WBC # FLD AUTO: 43.14 K/UL — CRITICAL HIGH (ref 3.8–10.5)

## 2023-03-24 PROCEDURE — 99222 1ST HOSP IP/OBS MODERATE 55: CPT

## 2023-03-24 RX ORDER — FUROSEMIDE 40 MG
40 TABLET ORAL DAILY
Refills: 0 | Status: DISCONTINUED | OUTPATIENT
Start: 2023-03-24 | End: 2023-03-29

## 2023-03-24 RX ORDER — CEFTRIAXONE 500 MG/1
1000 INJECTION, POWDER, FOR SOLUTION INTRAMUSCULAR; INTRAVENOUS EVERY 24 HOURS
Refills: 0 | Status: DISCONTINUED | OUTPATIENT
Start: 2023-03-24 | End: 2023-03-25

## 2023-03-24 RX ORDER — ATORVASTATIN CALCIUM 80 MG/1
10 TABLET, FILM COATED ORAL AT BEDTIME
Refills: 0 | Status: DISCONTINUED | OUTPATIENT
Start: 2023-03-24 | End: 2023-03-29

## 2023-03-24 RX ORDER — ONDANSETRON 8 MG/1
4 TABLET, FILM COATED ORAL EVERY 8 HOURS
Refills: 0 | Status: DISCONTINUED | OUTPATIENT
Start: 2023-03-24 | End: 2023-03-29

## 2023-03-24 RX ORDER — CEFTRIAXONE 500 MG/1
1000 INJECTION, POWDER, FOR SOLUTION INTRAMUSCULAR; INTRAVENOUS EVERY 24 HOURS
Refills: 0 | Status: DISCONTINUED | OUTPATIENT
Start: 2023-03-24 | End: 2023-03-24

## 2023-03-24 RX ORDER — TAMSULOSIN HYDROCHLORIDE 0.4 MG/1
0.4 CAPSULE ORAL AT BEDTIME
Refills: 0 | Status: DISCONTINUED | OUTPATIENT
Start: 2023-03-24 | End: 2023-03-29

## 2023-03-24 RX ORDER — ACETAMINOPHEN 500 MG
650 TABLET ORAL EVERY 6 HOURS
Refills: 0 | Status: DISCONTINUED | OUTPATIENT
Start: 2023-03-24 | End: 2023-03-29

## 2023-03-24 RX ORDER — METOPROLOL TARTRATE 50 MG
50 TABLET ORAL DAILY
Refills: 0 | Status: DISCONTINUED | OUTPATIENT
Start: 2023-03-24 | End: 2023-03-29

## 2023-03-24 RX ORDER — RIVAROXABAN 15 MG-20MG
20 KIT ORAL
Refills: 0 | Status: DISCONTINUED | OUTPATIENT
Start: 2023-03-24 | End: 2023-03-29

## 2023-03-24 RX ORDER — INFLUENZA VIRUS VACCINE 15; 15; 15; 15 UG/.5ML; UG/.5ML; UG/.5ML; UG/.5ML
0.7 SUSPENSION INTRAMUSCULAR ONCE
Refills: 0 | Status: COMPLETED | OUTPATIENT
Start: 2023-03-24 | End: 2023-03-24

## 2023-03-24 RX ORDER — ASPIRIN/CALCIUM CARB/MAGNESIUM 324 MG
81 TABLET ORAL DAILY
Refills: 0 | Status: DISCONTINUED | OUTPATIENT
Start: 2023-03-24 | End: 2023-03-29

## 2023-03-24 RX ORDER — PANTOPRAZOLE SODIUM 20 MG/1
40 TABLET, DELAYED RELEASE ORAL
Refills: 0 | Status: DISCONTINUED | OUTPATIENT
Start: 2023-03-24 | End: 2023-03-29

## 2023-03-24 RX ORDER — LANOLIN ALCOHOL/MO/W.PET/CERES
3 CREAM (GRAM) TOPICAL AT BEDTIME
Refills: 0 | Status: DISCONTINUED | OUTPATIENT
Start: 2023-03-24 | End: 2023-03-29

## 2023-03-24 RX ADMIN — ATORVASTATIN CALCIUM 10 MILLIGRAM(S): 80 TABLET, FILM COATED ORAL at 20:42

## 2023-03-24 RX ADMIN — Medication 81 MILLIGRAM(S): at 09:37

## 2023-03-24 RX ADMIN — Medication 650 MILLIGRAM(S): at 21:26

## 2023-03-24 RX ADMIN — CEFTRIAXONE 1000 MILLIGRAM(S): 500 INJECTION, POWDER, FOR SOLUTION INTRAMUSCULAR; INTRAVENOUS at 09:37

## 2023-03-24 RX ADMIN — Medication 650 MILLIGRAM(S): at 15:13

## 2023-03-24 RX ADMIN — Medication 40 MILLIGRAM(S): at 09:37

## 2023-03-24 RX ADMIN — Medication 50 MILLIGRAM(S): at 09:37

## 2023-03-24 RX ADMIN — PANTOPRAZOLE SODIUM 40 MILLIGRAM(S): 20 TABLET, DELAYED RELEASE ORAL at 06:51

## 2023-03-24 RX ADMIN — Medication 650 MILLIGRAM(S): at 14:13

## 2023-03-24 RX ADMIN — RIVAROXABAN 20 MILLIGRAM(S): KIT at 18:24

## 2023-03-24 NOTE — H&P ADULT - NSHPPHYSICALEXAM_GEN_ALL_CORE
T(C): 36.9 (03-24-23 @ 03:32), Max: 36.9 (03-24-23 @ 03:32)  HR: 106 (03-24-23 @ 03:32) (88 - 106)  BP: 107/60 (03-24-23 @ 03:32) (101/68 - 129/71)  RR: 23 (03-24-23 @ 03:32) (18 - 23)  SpO2: 100% (03-24-23 @ 03:32) (96% - 100%)    CONSTITUTIONAL: Well groomed, no apparent distress  EYES: PERRLA and symmetric, EOMI, No conjunctival or scleral injection, non-icteric  ENMT: Oral mucosa with moist membranes. Normal dentition; no pharyngeal injection or exudates             NECK: Supple, symmetric and without tracheal deviation   RESP: No respiratory distress, Bibasilar crackles.   CV: RRR, +S1S2, no MRG; no JVD; ++ peripheral edema  GI: Soft, NT, ND, no rebound, no guarding; no palpable masses; no hepatosplenomegaly; no hernia palpated  LYMPH: No cervical LAD or tenderness; no axillary LAD or tenderness; no inguinal LAD or tenderness  MSK: Normal ROM without pain, no spinal tenderness, normal muscle strength/tone  SKIN: No rashes or ulcers noted; no subcutaneous nodules or induration palpable  NEURO: CN II-XII intact; normal reflexes in upper and lower extremities, sensation intact in upper and lower extremities b/l to light touch   PSYCH: Appropriate insight/judgment; A+O x 3, mood and affect appropriate, recent/remote memory intact

## 2023-03-24 NOTE — PATIENT PROFILE ADULT - FALL HARM RISK - HARM RISK INTERVENTIONS

## 2023-03-24 NOTE — H&P ADULT - NSHPREVIEWOFSYSTEMS_GEN_ALL_CORE
Gen: No fever, chills, weakness  ENT: No visual changes or throat pain  Neck: No pain or stiffness  Respiratory: No cough or wheezing  Cardiovascular: No chest pain or palpitations  Gastrointestinal: ++abdominal pain, no nausea, vomiting, constipation, or diarrhea  Hematologic: No easy bleeding or bruising  Neurologic: No numbness or focal weakness  Psych: No depression or insomnia  Skin: No rash or itching

## 2023-03-24 NOTE — ED ADULT NURSE REASSESSMENT NOTE - COMFORT CARE
menu provided/plan of care explained/po fluids offered/repositioned/side rails up
plan of care explained/po fluids offered/repositioned/side rails up/warm blanket provided
ambulated to bathroom/darkened lights/plan of care explained/po fluids offered/repositioned/side rails up
plan of care explained/po fluids offered/repositioned/side rails up
plan of care explained

## 2023-03-24 NOTE — H&P ADULT - ASSESSMENT
A/P:    1.  CHF exacerbation  HFpEF  -started on IV lasix  -follow clinically  -daily weight  -follow Intake/Output     2.  Acute UTI  -started on IV Ceftriaxone  -follow cultures    3.  A fib  -continue Xarelto  -on BB    4.  Xarelto for DVT ppx    5.  Code status; Full code.

## 2023-03-24 NOTE — ED ADULT NURSE REASSESSMENT NOTE - NS ED NURSE REASSESS COMMENT FT1
Purposeful rounding completed. PT  given x2 pillows. No complaints or needs at this time. Call bell in reach.

## 2023-03-24 NOTE — H&P ADULT - HISTORY OF PRESENT ILLNESS
73 yo Female presented to the ER with complaints of diffuse abdominal pain radiates from lower abdomen to the back. She has dysuria. Patient also complain of bilateral feet swelling since discharge from hospital. She has progressive SOB with exertion. No chest pain. Patient is on 2L NC at home for recent PNA. Patient was just discharged from  on last Sunday for a fall, back pain, and kidney stones. No fever.

## 2023-03-25 PROCEDURE — 99232 SBSQ HOSP IP/OBS MODERATE 35: CPT

## 2023-03-25 RX ADMIN — Medication 81 MILLIGRAM(S): at 10:00

## 2023-03-25 RX ADMIN — Medication 650 MILLIGRAM(S): at 22:39

## 2023-03-25 RX ADMIN — Medication 50 MILLIGRAM(S): at 10:00

## 2023-03-25 RX ADMIN — RIVAROXABAN 20 MILLIGRAM(S): KIT at 17:51

## 2023-03-25 RX ADMIN — Medication 40 MILLIGRAM(S): at 14:30

## 2023-03-25 RX ADMIN — Medication 3 MILLIGRAM(S): at 17:29

## 2023-03-25 RX ADMIN — Medication 650 MILLIGRAM(S): at 23:30

## 2023-03-25 RX ADMIN — PANTOPRAZOLE SODIUM 40 MILLIGRAM(S): 20 TABLET, DELAYED RELEASE ORAL at 06:00

## 2023-03-25 NOTE — PROGRESS NOTE ADULT - SUBJECTIVE AND OBJECTIVE BOX
CHIEF COMPLAINT/INTERVAL HISTORY:    Patient is a 72y old  Female who presents with a chief complaint of CHF exacerbation and UTI (24 Mar 2023 04:09)      HPI:  71 yo Female presented to the ER with complaints of diffuse abdominal pain radiates from lower abdomen to the back. She has dysuria. Patient also complain of bilateral feet swelling since discharge from hospital. She has progressive SOB with exertion. No chest pain. Patient is on 2L NC at home for recent PNA. Patient was just discharged from  on last  for a fall, back pain, and kidney stones. No fever.  (24 Mar 2023 04:09)      SUBJECTIVE & OBJECTIVE: Pt seen and examined at bedside.     ICU Vital Signs Last 24 Hrs  T(C): 36.3 (24 Mar 2023 20:26), Max: 36.3 (24 Mar 2023 20:26)  T(F): 97.4 (24 Mar 2023 20:26), Max: 97.4 (24 Mar 2023 20:26)  HR: 100 (24 Mar 2023 20:26) (100 - 100)  BP: 100/55 (24 Mar 2023 20:26) (100/55 - 100/55)  BP(mean): --  ABP: --  ABP(mean): --  RR: 18 (24 Mar 2023 20:26) (18 - 18)  SpO2: 100% (24 Mar 2023 20:26) (100% - 100%)    O2 Parameters below as of 24 Mar 2023 20:26  Patient On (Oxygen Delivery Method): nasal cannula  O2 Flow (L/min): 2            MEDICATIONS  (STANDING):  aspirin enteric coated 81 milliGRAM(s) Oral daily  atorvastatin 10 milliGRAM(s) Oral at bedtime  furosemide   Injectable 40 milliGRAM(s) IV Push daily  metoprolol succinate ER 50 milliGRAM(s) Oral daily  pantoprazole    Tablet 40 milliGRAM(s) Oral before breakfast  rivaroxaban 20 milliGRAM(s) Oral with dinner  tamsulosin 0.4 milliGRAM(s) Oral at bedtime    MEDICATIONS  (PRN):  acetaminophen     Tablet .. 650 milliGRAM(s) Oral every 6 hours PRN Temp greater or equal to 38C (100.4F), Mild Pain (1 - 3)  aluminum hydroxide/magnesium hydroxide/simethicone Suspension 30 milliLiter(s) Oral every 4 hours PRN Dyspepsia  melatonin 3 milliGRAM(s) Oral at bedtime PRN Insomnia  ondansetron Injectable 4 milliGRAM(s) IV Push every 8 hours PRN Nausea and/or Vomiting        PHYSICAL EXAM:    GENERAL: NAD, well-groomed, well-developed  NERVOUS SYSTEM:  Alert & Oriented X3, Motor Strength 5/5 B/L upper and lower extremities; DTRs 2+ intact and symmetric  CHEST/LUNG: Clear to auscultation bilaterally; No rales, rhonchi, wheezing, or rubs  HEART: Regular rate and rhythm; No murmurs, rubs, or gallops  ABDOMEN: Soft, Nontender, Nondistended; Bowel sounds present  EXTREMITIES:  2+ Peripheral Pulses, No clubbing, cyanosis, or edema    LABS:                        7.5    43.14 )-----------( 791      ( 24 Mar 2023 06:58 )             25.1         139  |  104  |  41<H>  ----------------------------<  111<H>  5.4<H>   |  33<H>  |  1.05    Ca    8.4<L>      24 Mar 2023 06:58    TPro  7.0  /  Alb  2.8<L>  /  TBili  0.3  /  DBili  x   /  AST  25  /  ALT  32  /  AlkPhos  199<H>  -      Urinalysis Basic - ( 23 Mar 2023 19:40 )    Color: Yellow / Appearance: Clear / S.010 / pH: x  Gluc: x / Ketone: Negative  / Bili: Negative / Urobili: Negative   Blood: x / Protein: 30 mg/dL / Nitrite: Negative   Leuk Esterase: Small / RBC: >50 /HPF / WBC 11-25 /HPF   Sq Epi: x / Non Sq Epi: Occasional / Bacteria: Occasional    < from: TTE Echo Complete w/o Contrast w/ Doppler (03.15.23 @ 11:22) >   The aortic valve is well visualized, appears mildly calcified. Valve   opening seems to be normal.   The ascending aorta appears to be mildly dilated.   The left atrium appears moderately dilated.   The left ventricle is normal in size, wall thickness, wall motion and   contractility.   Estimated left ventricular ejection fraction is 60 %.   IVC is dilated and not collapsing with inspiration.   Mild mitral annular calcification is present.   There is thickening of both mitral valve leaflets. The leaflet opening is   normal.   Mild (1+) mitral regurgitation is present.   A small pericardial effusion is present.   Pleural effusion - is present.   Mild to moderate pulmonic valvular regurgitation (1+) is present.   The right atrium appears mildly dilated.   Normal appearing right ventricle structure and function.   Moderate (2+) tricuspid valve regurgitation is present.    c< from: CT Abdomen and Pelvis No Cont (23 @ 20:45) >  LOWER CHEST: Basilar atelectasis. Groundglass opacities, which may   represent pulmonary edema. Small bilateral pleural effusions.   Cardiomegaly with small to moderate pericardial effusion.              LE edema poss diastolic HF, pulm edema  HFpEF  -started on IV lasix but daily, low BP  borderline BP  pt keeps well hydrated at home! needs education  cardiology consult for optimization ? DCC        Chr A fib  -continue Xarelto  -on BB    was dc on o2 after PNA reeval home o2

## 2023-03-26 DIAGNOSIS — I50.32 CHRONIC DIASTOLIC (CONGESTIVE) HEART FAILURE: ICD-10-CM

## 2023-03-26 DIAGNOSIS — I48.91 UNSPECIFIED ATRIAL FIBRILLATION: ICD-10-CM

## 2023-03-26 LAB
ADD ON TEST-SPECIMEN IN LAB: SIGNIFICANT CHANGE UP
ANION GAP SERPL CALC-SCNC: 2 MMOL/L — LOW (ref 5–17)
BLD GP AB SCN SERPL QL: SIGNIFICANT CHANGE UP
BUN SERPL-MCNC: 37 MG/DL — HIGH (ref 7–23)
CALCIUM SERPL-MCNC: 8.8 MG/DL — SIGNIFICANT CHANGE UP (ref 8.5–10.1)
CHLORIDE SERPL-SCNC: 106 MMOL/L — SIGNIFICANT CHANGE UP (ref 96–108)
CO2 SERPL-SCNC: 35 MMOL/L — HIGH (ref 22–31)
CREAT SERPL-MCNC: 0.67 MG/DL — SIGNIFICANT CHANGE UP (ref 0.5–1.3)
DIR ANTIGLOB POLYSPECIFIC INTERPRETATION: SIGNIFICANT CHANGE UP
EGFR: 93 ML/MIN/1.73M2 — SIGNIFICANT CHANGE UP
GLUCOSE SERPL-MCNC: 103 MG/DL — HIGH (ref 70–99)
HCT VFR BLD CALC: 20.4 % — CRITICAL LOW (ref 34.5–45)
HCT VFR BLD CALC: 20.7 % — CRITICAL LOW (ref 34.5–45)
HGB BLD-MCNC: 6.1 G/DL — CRITICAL LOW (ref 11.5–15.5)
HGB BLD-MCNC: 6.3 G/DL — CRITICAL LOW (ref 11.5–15.5)
MCHC RBC-ENTMCNC: 27.5 PG — SIGNIFICANT CHANGE UP (ref 27–34)
MCHC RBC-ENTMCNC: 29.9 GM/DL — LOW (ref 32–36)
MCV RBC AUTO: 91.9 FL — SIGNIFICANT CHANGE UP (ref 80–100)
NT-PROBNP SERPL-SCNC: 2989 PG/ML — HIGH (ref 0–125)
PLATELET # BLD AUTO: 567 K/UL — HIGH (ref 150–400)
POTASSIUM SERPL-MCNC: 5 MMOL/L — SIGNIFICANT CHANGE UP (ref 3.5–5.3)
POTASSIUM SERPL-SCNC: 5 MMOL/L — SIGNIFICANT CHANGE UP (ref 3.5–5.3)
RBC # BLD: 2.22 M/UL — LOW (ref 3.8–5.2)
RBC # FLD: 20.9 % — HIGH (ref 10.3–14.5)
SODIUM SERPL-SCNC: 143 MMOL/L — SIGNIFICANT CHANGE UP (ref 135–145)
WBC # BLD: 29.35 K/UL — HIGH (ref 3.8–10.5)
WBC # FLD AUTO: 29.35 K/UL — HIGH (ref 3.8–10.5)

## 2023-03-26 PROCEDURE — 99222 1ST HOSP IP/OBS MODERATE 55: CPT

## 2023-03-26 PROCEDURE — 99233 SBSQ HOSP IP/OBS HIGH 50: CPT

## 2023-03-26 RX ADMIN — Medication 81 MILLIGRAM(S): at 09:01

## 2023-03-26 RX ADMIN — RIVAROXABAN 20 MILLIGRAM(S): KIT at 18:59

## 2023-03-26 RX ADMIN — TAMSULOSIN HYDROCHLORIDE 0.4 MILLIGRAM(S): 0.4 CAPSULE ORAL at 00:10

## 2023-03-26 RX ADMIN — Medication 40 MILLIGRAM(S): at 20:12

## 2023-03-26 RX ADMIN — TAMSULOSIN HYDROCHLORIDE 0.4 MILLIGRAM(S): 0.4 CAPSULE ORAL at 22:33

## 2023-03-26 RX ADMIN — Medication 50 MILLIGRAM(S): at 22:34

## 2023-03-26 RX ADMIN — ATORVASTATIN CALCIUM 10 MILLIGRAM(S): 80 TABLET, FILM COATED ORAL at 22:33

## 2023-03-26 RX ADMIN — ATORVASTATIN CALCIUM 10 MILLIGRAM(S): 80 TABLET, FILM COATED ORAL at 00:09

## 2023-03-26 RX ADMIN — PANTOPRAZOLE SODIUM 40 MILLIGRAM(S): 20 TABLET, DELAYED RELEASE ORAL at 06:16

## 2023-03-26 NOTE — CONSULT NOTE ADULT - SUBJECTIVE AND OBJECTIVE BOX
CHIEF COMPLAINT: Resolving abdominal pain, RLE bruising    HPI:  71 year old woman with a history of atrial fibrillation, small-medium pericardial effusion, myelofibrosis, recent pneumonia, renal stone who presented to the ER for the evaluation of lower abdominal / pelvic discomfort.  Cardiology consult requested for CHF.  Mrs Moncada describes mild bilateral distal leg and foot edema -- mildly bothersome; no clear exacerbating or alleviating factors.  She denies dyspnea (says she is fine when she uses supplemental O2 -- has been using since recent [facility] hospitalization for pneumonia); no orthopnea.  During her last hospitalization earlier this month I spoke to her outpatient cardiologist, Dr Khris Osorio ([place]).    PAST MEDICAL & SURGICAL HISTORY:  Myelofibrosis  PNA (pneumonia)  Anemia  Atrial fibrillation  CHF  No significant past surgical history    SOCIAL HISTORY:   Alcohol: Denied  Smoking: Nonsmoker  Marital Status:     FAMILY HISTORY: No pertinent family history in first degree relatives    MEDICATIONS  (STANDING):  aspirin enteric coated 81 milliGRAM(s) Oral daily  atorvastatin 10 milliGRAM(s) Oral at bedtime  furosemide   Injectable 40 milliGRAM(s) IV Push daily  metoprolol succinate ER 50 milliGRAM(s) Oral daily  pantoprazole    Tablet 40 milliGRAM(s) Oral before breakfast  rivaroxaban 20 milliGRAM(s) Oral with dinner  tamsulosin 0.4 milliGRAM(s) Oral at bedtime    MEDICATIONS  (PRN):  acetaminophen     Tablet .. 650 milliGRAM(s) Oral every 6 hours PRN Temp greater or equal to 38C (100.4F), Mild Pain (1 - 3)  aluminum hydroxide/magnesium hydroxide/simethicone Suspension 30 milliLiter(s) Oral every 4 hours PRN Dyspepsia  melatonin 3 milliGRAM(s) Oral at bedtime PRN Insomnia  ondansetron Injectable 4 milliGRAM(s) IV Push every 8 hours PRN Nausea and/or Vomiting    Allergies:  No Known Allergies    REVIEW OF SYSTEMS:  CONSTITUTIONAL: No fevers or chills  Eyes: No visual changes  NECK: No pain or stiffness  RESPIRATORY: SEE HPI  CARDIOVASCULAR: No chest pain or palpitations  GASTROINTESTINAL: No abdominal pain. No nausea, vomiting, or hematemesis; No diarrhea or constipation. No melena or hematochezia.  GENITOURINARY: No dysuria, frequency or hematuria  NEUROLOGICAL: No numbness.  SKIN: No itching or rash  All other review of systems is negative unless indicated above    VITAL SIGNS:   Vital Signs Last 24 Hrs  T(C): 36.3 (26 Mar 2023 08:28), Max: 36.4 (25 Mar 2023 22:01)  T(F): 97.4 (26 Mar 2023 08:28), Max: 97.6 (25 Mar 2023 22:01)  HR: 118 (26 Mar 2023 09:00) (106 - 118)  BP: 98/61 (26 Mar 2023 08:59) (95/54 - 113/72)  RR: 22 (26 Mar 2023 09:00) (18 - 22)  SpO2: 95% (26 Mar 2023 09:00) (94% - 100%)    PHYSICAL EXAM:  Constitutional: NAD, awake and alert  HEENT:  EOMI,  Pupils round, No oral cyanosis.  Pulmonary: Non-labored, breath sounds are clear bilaterally, No wheezing, rales or rhonchi  Cardiovascular: S1 and S2, regular rate and rhythm, no Murmurs, gallops or rubs  Gastrointestinal: Bowel Sounds present, soft, nontender.   Lymph: No peripheral edema. No cervical lymphadenopathy.  Neurological: Alert, no focal deficits  Skin: No rashes.  Psych:  Mood & affect appropriate    LABS:              7.5    43.14 )-----------( 791      ( 24 Mar 2023 06:58 )             25.1              143    |  106    |  37     ----------------------------<  103    5.0     |  35     |  0.67     Ca    8.8        26 Mar 2023 06:27    TPro  7.0    /  Alb  2.8    /  TBili  0.3    /  DBili  x      /  AST  25     /  ALT  32     /  AlkPhos  199    24 Mar 2023 06:58  Pro-Brain Natriuretic Peptide: 2989 pg/mL (03.26.23 @ 06:27)   Pro-Brain Natriuretic Peptide: 3299 pg/mL (03.23.23 @ 19:40)   Pro-Brain Natriuretic Peptide: 4152 pg/mL (03.14.23 @ 20:54) Pro-Brain Natriuretic Peptide (03.26.23 @ 06:27)   TTE Echo Complete w/o Contrast w/ Doppler (03.15.23 @ 11:22):   The aortic valve is well visualized, appears mildly calcified. Valve opening seems to be normal.   The ascending aorta appears to be mildly dilated.   The left atrium appears moderately dilated.   The left ventricle is normal in size, wall thickness, wall motion and contractility. Estimated left ventricular ejection fraction is 60 %.   IVC is dilated and not collapsing with inspiration.   Mild mitral annular calcification is present.   There is thickening of both mitral valve leaflets. The leaflet opening is normal. Mild (1+) mitral regurgitation is present.   A small pericardial effusion is present.   Pleural effusion - is present.   Mild to moderate pulmonic valvular regurgitation (1+) is present.   The right atrium appears mildly dilated.   Normal appearing right ventricle structure and function. Moderate (2+) tricuspid valve regurgitation is present.    Tele:   bpm at time of encounter    Xray Chest 1 View-PORTABLE IMMEDIATE (Xray Chest 1 View-PORTABLE IMMEDIATE .) (03.23.23 @ 20:00):  Heart is enlarged.  The lung fields and pleural surfaces are unremarkable.

## 2023-03-26 NOTE — CONSULT NOTE ADULT - PROBLEM SELECTOR RECOMMENDATION 9
Mild pedal edema but clear lungs on CXR (and exam); BNP elevated but lower than prior assays (2989 on admission -- trending downward from 3/14/23 = 4152); I recommend diuresis with oral furosemide given mild hypotension.

## 2023-03-26 NOTE — CONSULT NOTE ADULT - PROBLEM SELECTOR RECOMMENDATION 2
Rate mildly elevated; unable to titrate metoprolol due to low BP; if persistent tachycardia, can add digoxin.  Continue anticoagulation.

## 2023-03-26 NOTE — PROGRESS NOTE ADULT - SUBJECTIVE AND OBJECTIVE BOX
CHIEF COMPLAINT/INTERVAL HISTORY:    Patient is a 72y old  Female who presents with a chief complaint of CHF exacerbation and UTI (24 Mar 2023 04:09)      HPI:  71 yo Female presented to the ER with complaints of diffuse abdominal pain radiates from lower abdomen to the back. She has dysuria. Patient also complain of bilateral feet swelling since discharge from hospital. She has progressive SOB with exertion. No chest pain. Patient is on 2L NC at home for recent PNA. Patient was just discharged from  on last  for a fall, back pain, and kidney stones. No fever.  (24 Mar 2023 04:09)    c/o SOB, blood work revealed worsening anemia    Vital Signs Last 24 Hrs  T(C): 36.8 (26 Mar 2023 15:27), Max: 36.8 (26 Mar 2023 15:27)  T(F): 98.3 (26 Mar 2023 15:27), Max: 98.3 (26 Mar 2023 15:27)  HR: 110 (26 Mar 2023 15:27) (106 - 118)  BP: 96/58 (26 Mar 2023 15:27) (95/54 - 100/60)  BP(mean): --  RR: 21 (26 Mar 2023 15:27) (18 - 22)  SpO2: 100% (26 Mar 2023 15:27) (94% - 100%)    Parameters below as of 26 Mar 2023 15:27  Patient On (Oxygen Delivery Method): nasal cannula  O2 Flow (L/min): 2            MEDICATIONS  (STANDING):  aspirin enteric coated 81 milliGRAM(s) Oral daily  atorvastatin 10 milliGRAM(s) Oral at bedtime  furosemide   Injectable 40 milliGRAM(s) IV Push daily  metoprolol succinate ER 50 milliGRAM(s) Oral daily  pantoprazole    Tablet 40 milliGRAM(s) Oral before breakfast  rivaroxaban 20 milliGRAM(s) Oral with dinner  tamsulosin 0.4 milliGRAM(s) Oral at bedtime    MEDICATIONS  (PRN):  acetaminophen     Tablet .. 650 milliGRAM(s) Oral every 6 hours PRN Temp greater or equal to 38C (100.4F), Mild Pain (1 - 3)  aluminum hydroxide/magnesium hydroxide/simethicone Suspension 30 milliLiter(s) Oral every 4 hours PRN Dyspepsia  melatonin 3 milliGRAM(s) Oral at bedtime PRN Insomnia  ondansetron Injectable 4 milliGRAM(s) IV Push every 8 hours PRN Nausea and/or Vomiting        PHYSICAL EXAM:    GENERAL: NAD, well-groomed, well-developed  NERVOUS SYSTEM:  Alert & Oriented X3, Motor Strength 5/5 B/L upper and lower extremities; DTRs 2+ intact and symmetric  CHEST/LUNG: Clear to auscultation bilaterally; No rales, rhonchi, wheezing, or rubs  HEART: Regular rate and rhythm; No murmurs, rubs, or gallops  ABDOMEN: Soft, Nontender, Nondistended; Bowel sounds present  EXTREMITIES:  2+ Peripheral Pulses, No clubbing, cyanosis, or edema    LABS:                        7.5    43.14 )-----------( 791      ( 24 Mar 2023 06:58 )             25.1         139  |  104  |  41<H>  ----------------------------<  111<H>  5.4<H>   |  33<H>  |  1.05    Ca    8.4<L>      24 Mar 2023 06:58    TPro  7.0  /  Alb  2.8<L>  /  TBili  0.3  /  DBili  x   /  AST  25  /  ALT  32  /  AlkPhos  199<H>  24      Urinalysis Basic - ( 23 Mar 2023 19:40 )    Color: Yellow / Appearance: Clear / S.010 / pH: x  Gluc: x / Ketone: Negative  / Bili: Negative / Urobili: Negative   Blood: x / Protein: 30 mg/dL / Nitrite: Negative   Leuk Esterase: Small / RBC: >50 /HPF / WBC 11-25 /HPF   Sq Epi: x / Non Sq Epi: Occasional / Bacteria: Occasional    < from: TTE Echo Complete w/o Contrast w/ Doppler (03.15.23 @ 11:22) >   The aortic valve is well visualized, appears mildly calcified. Valve   opening seems to be normal.   The ascending aorta appears to be mildly dilated.   The left atrium appears moderately dilated.   The left ventricle is normal in size, wall thickness, wall motion and   contractility.   Estimated left ventricular ejection fraction is 60 %.   IVC is dilated and not collapsing with inspiration.   Mild mitral annular calcification is present.   There is thickening of both mitral valve leaflets. The leaflet opening is   normal.   Mild (1+) mitral regurgitation is present.   A small pericardial effusion is present.   Pleural effusion - is present.   Mild to moderate pulmonic valvular regurgitation (1+) is present.   The right atrium appears mildly dilated.   Normal appearing right ventricle structure and function.   Moderate (2+) tricuspid valve regurgitation is present.    c< from: CT Abdomen and Pelvis No Cont (23 @ 20:45) >  LOWER CHEST: Basilar atelectasis. Groundglass opacities, which may   represent pulmonary edema. Small bilateral pleural effusions.   Cardiomegaly with small to moderate pericardial effusion.              LE edema poss diastolic HF, pulm edema  HFpEF  -started on IV lasix but daily, low BP  borderline BP  pt keeps well hydrated at home! needs education  cardiology consult for optimization ? DCC    myelodysplasia  worsening anemia  transfuse  no evidence  of bleeding     Chr A fib  -continue Xarelto  -on BB    was dc on o2 after PNA reeval home o2    case d/w

## 2023-03-27 ENCOUNTER — TRANSCRIPTION ENCOUNTER (OUTPATIENT)
Age: 73
End: 2023-03-27

## 2023-03-27 LAB
ANION GAP SERPL CALC-SCNC: 4 MMOL/L — LOW (ref 5–17)
BUN SERPL-MCNC: 33 MG/DL — HIGH (ref 7–23)
CALCIUM SERPL-MCNC: 8.4 MG/DL — LOW (ref 8.5–10.1)
CHLORIDE SERPL-SCNC: 101 MMOL/L — SIGNIFICANT CHANGE UP (ref 96–108)
CO2 SERPL-SCNC: 34 MMOL/L — HIGH (ref 22–31)
CREAT SERPL-MCNC: 0.68 MG/DL — SIGNIFICANT CHANGE UP (ref 0.5–1.3)
EGFR: 92 ML/MIN/1.73M2 — SIGNIFICANT CHANGE UP
GLUCOSE SERPL-MCNC: 122 MG/DL — HIGH (ref 70–99)
HCT VFR BLD CALC: 23.8 % — LOW (ref 34.5–45)
HGB BLD-MCNC: 7.2 G/DL — LOW (ref 11.5–15.5)
MCHC RBC-ENTMCNC: 27.8 PG — SIGNIFICANT CHANGE UP (ref 27–34)
MCHC RBC-ENTMCNC: 30.3 GM/DL — LOW (ref 32–36)
MCV RBC AUTO: 91.9 FL — SIGNIFICANT CHANGE UP (ref 80–100)
PLATELET # BLD AUTO: 544 K/UL — HIGH (ref 150–400)
POTASSIUM SERPL-MCNC: 4.3 MMOL/L — SIGNIFICANT CHANGE UP (ref 3.5–5.3)
POTASSIUM SERPL-SCNC: 4.3 MMOL/L — SIGNIFICANT CHANGE UP (ref 3.5–5.3)
RBC # BLD: 2.59 M/UL — LOW (ref 3.8–5.2)
RBC # FLD: 19.9 % — HIGH (ref 10.3–14.5)
SODIUM SERPL-SCNC: 139 MMOL/L — SIGNIFICANT CHANGE UP (ref 135–145)
WBC # BLD: 33.04 K/UL — HIGH (ref 3.8–10.5)
WBC # FLD AUTO: 33.04 K/UL — HIGH (ref 3.8–10.5)

## 2023-03-27 PROCEDURE — 99233 SBSQ HOSP IP/OBS HIGH 50: CPT

## 2023-03-27 RX ORDER — FUROSEMIDE 40 MG
20 TABLET ORAL ONCE
Refills: 0 | Status: DISCONTINUED | OUTPATIENT
Start: 2023-03-27 | End: 2023-03-28

## 2023-03-27 RX ADMIN — Medication 40 MILLIGRAM(S): at 10:09

## 2023-03-27 RX ADMIN — Medication 3 MILLIGRAM(S): at 00:20

## 2023-03-27 RX ADMIN — Medication 50 MILLIGRAM(S): at 10:09

## 2023-03-27 RX ADMIN — Medication 81 MILLIGRAM(S): at 10:09

## 2023-03-27 RX ADMIN — PANTOPRAZOLE SODIUM 40 MILLIGRAM(S): 20 TABLET, DELAYED RELEASE ORAL at 05:45

## 2023-03-27 NOTE — PROGRESS NOTE ADULT - PROBLEM SELECTOR PLAN 1
·  Problem: Chronic diastolic congestive heart failure.   ·  Recommendation: Mild pedal edema but clear lungs on CXR (and exam); BNP elevated but lower than prior assays (2989 on admission -- trending downward from 3/14/23 = 4152); recommend diuresis with oral furosemide given mild hypotension.

## 2023-03-27 NOTE — DISCHARGE NOTE NURSING/CASE MANAGEMENT/SOCIAL WORK - PATIENT PORTAL LINK FT
You can access the FollowMyHealth Patient Portal offered by [facility] by registering at the following website: [email or web address]. By joining [facility]’s FollowMyHealth portal, you will also be able to view your health information using other applications (apps) compatible with our system.

## 2023-03-27 NOTE — PROGRESS NOTE ADULT - SUBJECTIVE AND OBJECTIVE BOX
CHIEF COMPLAINT: Resolving abdominal pain, RLE bruising    HPI:  71 year old woman with a history of atrial fibrillation, small-medium pericardial effusion, myelofibrosis, recent pneumonia, renal stone who presented to the ER for the evaluation of lower abdominal / pelvic discomfort.  Cardiology consult requested for CHF.  Mrs Moncada describes mild bilateral distal leg and foot edema -- mildly bothersome; no clear exacerbating or alleviating factors.  She denies dyspnea (says she is fine when she uses supplemental O2 -- has been using since recent [facility] hospitalization for pneumonia); no orthopnea.  During her last hospitalization earlier this month I spoke to her outpatient cardiologist, Dr Khris Osorio ([place]).    3/27/23: denies chest pain/SOB/Palpitations, c/o B/L LE edema; Tele: Afib     MEDICATIONS  (STANDING):  aspirin enteric coated 81 milliGRAM(s) Oral daily  atorvastatin 10 milliGRAM(s) Oral at bedtime  furosemide   Injectable 40 milliGRAM(s) IV Push daily  metoprolol succinate ER 50 milliGRAM(s) Oral daily  pantoprazole    Tablet 40 milliGRAM(s) Oral before breakfast  rivaroxaban 20 milliGRAM(s) Oral with dinner  tamsulosin 0.4 milliGRAM(s) Oral at bedtime    MEDICATIONS  (PRN):  acetaminophen     Tablet .. 650 milliGRAM(s) Oral every 6 hours PRN Temp greater or equal to 38C (100.4F), Mild Pain (1 - 3)  aluminum hydroxide/magnesium hydroxide/simethicone Suspension 30 milliLiter(s) Oral every 4 hours PRN Dyspepsia  melatonin 3 milliGRAM(s) Oral at bedtime PRN Insomnia  ondansetron Injectable 4 milliGRAM(s) IV Push every 8 hours PRN Nausea and/or Vomiting    Vital Signs Last 24 Hrs  T(C): 36.5 (27 Mar 2023 08:21), Max: 36.8 (26 Mar 2023 15:27)  T(F): 97.7 (27 Mar 2023 08:21), Max: 98.3 (26 Mar 2023 15:27)  HR: 105 (27 Mar 2023 08:21) (104 - 116)  BP: 96/59 (27 Mar 2023 08:21) (94/63 - 119/68)  BP(mean): --  RR: 18 (27 Mar 2023 08:21) (16 - 21)  SpO2: 96% (27 Mar 2023 08:21) (96% - 100%)    Parameters below as of 27 Mar 2023 08:21  Patient On (Oxygen Delivery Method): nasal cannula  O2 Flow (L/min): 2      PHYSICAL EXAM:  Constitutional: NAD, awake and alert  HEENT:  EOMI,  Pupils round, No oral cyanosis.  Pulmonary: Non-labored, breath sounds are clear bilaterally, No wheezing, rales or rhonchi  Cardiovascular: S1 and S2, regular rate and rhythm, no Murmurs, gallops or rubs  Gastrointestinal: Bowel Sounds present, soft, nontender.   Lymph: No peripheral edema. No cervical lymphadenopathy.  Neurological: Alert, no focal deficits  Skin: No rashes.  Psych:  Mood & affect appropriate    LABS:                       7.2    33.04 )-----------( 544      ( 27 Mar 2023 07:02 )             23.8     03-27    139  |  101  |  33<H>  ----------------------------<  122<H>  4.3   |  34<H>  |  0.68    Ca    8.4<L>      27 Mar 2023 07:02      - TroponinI hsT:               7.5    43.14 )-----------( 791      ( 24 Mar 2023 06:58 )             25.1              143    |  106    |  37     ----------------------------<  103    5.0     |  35     |  0.67     Ca    8.8        26 Mar 2023 06:27    TPro  7.0    /  Alb  2.8    /  TBili  0.3    /  DBili  x      /  AST  25     /  ALT  32     /  AlkPhos  199    24 Mar 2023 06:58  Pro-Brain Natriuretic Peptide: 2989 pg/mL (03.26.23 @ 06:27)   Pro-Brain Natriuretic Peptide: 3299 pg/mL (03.23.23 @ 19:40)   Pro-Brain Natriuretic Peptide: 4152 pg/mL (03.14.23 @ 20:54) Pro-Brain Natriuretic Peptide (03.26.23 @ 06:27)   TTE Echo Complete w/o Contrast w/ Doppler (03.15.23 @ 11:22):   The aortic valve is well visualized, appears mildly calcified. Valve opening seems to be normal.   The ascending aorta appears to be mildly dilated.   The left atrium appears moderately dilated.   The left ventricle is normal in size, wall thickness, wall motion and contractility. Estimated left ventricular ejection fraction is 60 %.   IVC is dilated and not collapsing with inspiration.   Mild mitral annular calcification is present.   There is thickening of both mitral valve leaflets. The leaflet opening is normal. Mild (1+) mitral regurgitation is present.   A small pericardial effusion is present.   Pleural effusion - is present.   Mild to moderate pulmonic valvular regurgitation (1+) is present.   The right atrium appears mildly dilated.   Normal appearing right ventricle structure and function. Moderate (2+) tricuspid valve regurgitation is present.    Tele:   bpm at time of encounter    Xray Chest 1 View-PORTABLE IMMEDIATE (Xray Chest 1 View-PORTABLE IMMEDIATE .) (03.23.23 @ 20:00):  Heart is enlarged.  The lung fields and pleural surfaces are unremarkable.     CHIEF COMPLAINT: Resolving abdominal pain, RLE bruising    HPI:  71 year old woman with a history of atrial fibrillation, small-medium pericardial effusion, myelofibrosis, recent pneumonia, renal stone who presented to the ER for the evaluation of lower abdominal / pelvic discomfort.  Cardiology consult requested for CHF.  Mrs Moncada describes mild bilateral distal leg and foot edema -- mildly bothersome; no clear exacerbating or alleviating factors.  She denies dyspnea (says she is fine when she uses supplemental O2 -- has been using since recent [facility] hospitalization for pneumonia); no orthopnea.  During her last hospitalization earlier this month I spoke to her outpatient cardiologist, Dr Khris Osorio ([place]).    3/27/23: denies chest pain/SOB/Palpitations, c/o B/L LE edema; Tele: Afib     MEDICATIONS  (STANDING):  aspirin enteric coated 81 milliGRAM(s) Oral daily  atorvastatin 10 milliGRAM(s) Oral at bedtime  furosemide   Injectable 40 milliGRAM(s) IV Push daily  metoprolol succinate ER 50 milliGRAM(s) Oral daily  pantoprazole    Tablet 40 milliGRAM(s) Oral before breakfast  rivaroxaban 20 milliGRAM(s) Oral with dinner  tamsulosin 0.4 milliGRAM(s) Oral at bedtime    MEDICATIONS  (PRN):  acetaminophen     Tablet .. 650 milliGRAM(s) Oral every 6 hours PRN Temp greater or equal to 38C (100.4F), Mild Pain (1 - 3)  aluminum hydroxide/magnesium hydroxide/simethicone Suspension 30 milliLiter(s) Oral every 4 hours PRN Dyspepsia  melatonin 3 milliGRAM(s) Oral at bedtime PRN Insomnia  ondansetron Injectable 4 milliGRAM(s) IV Push every 8 hours PRN Nausea and/or Vomiting    Vital Signs Last 24 Hrs  T(C): 36.5 (27 Mar 2023 08:21), Max: 36.8 (26 Mar 2023 15:27)  T(F): 97.7 (27 Mar 2023 08:21), Max: 98.3 (26 Mar 2023 15:27)  HR: 105 (27 Mar 2023 08:21) (104 - 116)  BP: 96/59 (27 Mar 2023 08:21) (94/63 - 119/68)  BP(mean): --  RR: 18 (27 Mar 2023 08:21) (16 - 21)  SpO2: 96% (27 Mar 2023 08:21) (96% - 100%)    Parameters below as of 27 Mar 2023 08:21  Patient On (Oxygen Delivery Method): nasal cannula  O2 Flow (L/min): 2      PHYSICAL EXAM:  Constitutional: NAD, awake and alert  HEENT:  EOMI,  Pupils round, No oral cyanosis.  Pulmonary: Non-labored, breath sounds are clear bilaterally, No wheezing, rales or rhonchi  Cardiovascular: S1 and S2, regular rate and rhythm, no Murmurs, gallops or rubs  Gastrointestinal: Bowel Sounds present, soft, nontender.   Lymph: No peripheral edema. No cervical lymphadenopathy.  Neurological: Alert, no focal deficits  Skin: No rashes.  Psych:  Mood & affect appropriate    LABS:                       7.2    33.04 )-----------( 544      ( 27 Mar 2023 07:02 )             23.8     03-27    139  |  101  |  33<H>  ----------------------------<  122<H>  4.3   |  34<H>  |  0.68    Ca    8.4<L>      27 Mar 2023 07:02      - TroponinI hsT:               7.5    43.14 )-----------( 791      ( 24 Mar 2023 06:58 )             25.1              143    |  106    |  37     ----------------------------<  103    5.0     |  35     |  0.67     Ca    8.8        26 Mar 2023 06:27    TPro  7.0    /  Alb  2.8    /  TBili  0.3    /  DBili  x      /  AST  25     /  ALT  32     /  AlkPhos  199    24 Mar 2023 06:58  Pro-Brain Natriuretic Peptide: 2989 pg/mL (03.26.23 @ 06:27)   Pro-Brain Natriuretic Peptide: 3299 pg/mL (03.23.23 @ 19:40)   Pro-Brain Natriuretic Peptide: 4152 pg/mL (03.14.23 @ 20:54) Pro-Brain Natriuretic Peptide (03.26.23 @ 06:27)   TTE Echo Complete w/o Contrast w/ Doppler (03.15.23 @ 11:22):   The aortic valve is well visualized, appears mildly calcified. Valve opening seems to be normal.   The ascending aorta appears to be mildly dilated.   The left atrium appears moderately dilated.   The left ventricle is normal in size, wall thickness, wall motion and contractility. Estimated left ventricular ejection fraction is 60 %.   IVC is dilated and not collapsing with inspiration.   Mild mitral annular calcification is present.   There is thickening of both mitral valve leaflets. The leaflet opening is normal. Mild (1+) mitral regurgitation is present.   A small pericardial effusion is present.   Pleural effusion - is present.   Mild to moderate pulmonic valvular regurgitation (1+) is present.   The right atrium appears mildly dilated.   Normal appearing right ventricle structure and function. Moderate (2+) tricuspid valve regurgitation is present.    Tele:   bpm at time of encounter    Xray Chest 1 View-PORTABLE IMMEDIATE (Xray Chest 1 View-PORTABLE IMMEDIATE .) (03.23.23 @ 20:00):  Heart is enlarged.  The lung fields and pleural surfaces are unremarkable.

## 2023-03-27 NOTE — PROGRESS NOTE ADULT - SUBJECTIVE AND OBJECTIVE BOX
CHIEF COMPLAINT/INTERVAL HISTORY:    Patient is a 72y old  Female who presents with a chief complaint of CHF exacerbation and UTI (24 Mar 2023 04:09)      HPI:  73 yo Female with h/o myelodysplasia presented to the ER with complaints of diffuse abdominal pain radiates from lower abdomen to the back. She has dysuria. Patient also complain of bilateral feet swelling since discharge from hospital. She has progressive SOB with exertion. No chest pain. Patient is on 2L NC at home for recent PNA. Patient was just discharged from  on last  for a fall, back pain, and kidney stones. No fever.  (24 Mar 2023 04:09). No UTI, diuresis attempted but limited by bp    c/o SOB, blood work revealed worsening anemia required prbc 24 h ago and will receive another unit per her Hem at [facility]    Vital Signs Last 24 Hrs  T(C): 36.5 (27 Mar 2023 08:21), Max: 36.8 (26 Mar 2023 18:48)  T(F): 97.7 (27 Mar 2023 08:21), Max: 98.2 (26 Mar 2023 18:48)  HR: 105 (27 Mar 2023 08:21) (104 - 112)  BP: 96/59 (27 Mar 2023 08:21) (96/59 - 119/68)  BP(mean): --  RR: 18 (27 Mar 2023 08:21) (16 - 18)  SpO2: 96% (27 Mar 2023 08:21) (96% - 100%)    Parameters below as of 27 Mar 2023 08:21  Patient On (Oxygen Delivery Method): nasal cannula  O2 Flow (L/min): 2      MEDICATIONS  (STANDING):  aspirin enteric coated 81 milliGRAM(s) Oral daily  atorvastatin 10 milliGRAM(s) Oral at bedtime  furosemide   Injectable 40 milliGRAM(s) IV Push daily  metoprolol succinate ER 50 milliGRAM(s) Oral daily  pantoprazole    Tablet 40 milliGRAM(s) Oral before breakfast  rivaroxaban 20 milliGRAM(s) Oral with dinner  tamsulosin 0.4 milliGRAM(s) Oral at bedtime        PHYSICAL EXAM:    GENERAL: NAD, well-groomed, well-developed  NERVOUS SYSTEM:  Alert & Oriented X3, Motor Strength 5/5 B/L upper and lower extremities; DTRs 2+ intact and symmetric  CHEST/LUNG: Clear to auscultation bilaterally; No rales, rhonchi, wheezing, or rubs  HEART: Regular rate and rhythm; No murmurs, rubs, or gallops  ABDOMEN: Soft, Nontender, Nondistended; Bowel sounds present  EXTREMITIES:  2+ Peripheral Pulses, No clubbing, cyanosis, or edema    LABS:                                   7.2    33.04 )-----------( 544      ( 27 Mar 2023 07:02 )             23.8         Urinalysis Basic - ( 23 Mar 2023 19:40 )    Color: Yellow / Appearance: Clear / S.010 / pH: x  Gluc: x / Ketone: Negative  / Bili: Negative / Urobili: Negative   Blood: x / Protein: 30 mg/dL / Nitrite: Negative   Leuk Esterase: Small / RBC: >50 /HPF / WBC 11-25 /HPF   Sq Epi: x / Non Sq Epi: Occasional / Bacteria: Occasional    < from: TTE Echo Complete w/o Contrast w/ Doppler (03.15.23 @ 11:22) >   The aortic valve is well visualized, appears mildly calcified. Valve   opening seems to be normal.   The ascending aorta appears to be mildly dilated.   The left atrium appears moderately dilated.   The left ventricle is normal in size, wall thickness, wall motion and   contractility.   Estimated left ventricular ejection fraction is 60 %.   IVC is dilated and not collapsing with inspiration.   Mild mitral annular calcification is present.   There is thickening of both mitral valve leaflets. The leaflet opening is   normal.   Mild (1+) mitral regurgitation is present.   A small pericardial effusion is present.   Pleural effusion - is present.   Mild to moderate pulmonic valvular regurgitation (1+) is present.   The right atrium appears mildly dilated.   Normal appearing right ventricle structure and function.   Moderate (2+) tricuspid valve regurgitation is present.    c< from: CT Abdomen and Pelvis No Cont (23 @ 20:45) >  LOWER CHEST: Basilar atelectasis. Groundglass opacities, which may   represent pulmonary edema. Small bilateral pleural effusions.   Cardiomegaly with small to moderate pericardial effusion.      < from: Xray Chest 1 View-PORTABLE IMMEDIATE (Xray Chest 1 View-PORTABLE IMMEDIATE .) (23 @ 20:00) >  Heart is enlarged.    The lung fields and pleural surfaces are unremarkable.    IMPRESSION: Cardiomegaly.            LE edema poss diastolic HF, pulm edema  HFpEF with prev small to mod peric effusion  -started on IV lasix but daily, low BP  borderline BP  pt keeps well hydrated at home! needs education  cardiology consult for optimization ; metoprolol at 50 to better ctr HR; might need dig  the small peric effusion read as moderate on CT target TTE ordered to asses peric eff    myelodysplasia treatment stopped b/o elevated amylase lipase   worsening anemia  transfuse second unit      Chr A fib  -continue Xarelto  -on BB; LA 4.7cm, DCC will not work    ambulate to see HR, borderline BP; lasix before transfusion    case d/w     IF hh stable and AF rate well ctr no hypotension can be dc  MD from [facility] requested lipase and amylase, pt had adverse rection to chemo used; i ordered both; TTE ordered

## 2023-03-28 LAB
HCT VFR BLD CALC: 26.4 % — LOW (ref 34.5–45)
HGB BLD-MCNC: 7.9 G/DL — LOW (ref 11.5–15.5)
MCHC RBC-ENTMCNC: 27.2 PG — SIGNIFICANT CHANGE UP (ref 27–34)
MCHC RBC-ENTMCNC: 29.9 GM/DL — LOW (ref 32–36)
MCV RBC AUTO: 91 FL — SIGNIFICANT CHANGE UP (ref 80–100)
PLATELET # BLD AUTO: 558 K/UL — HIGH (ref 150–400)
RBC # BLD: 2.9 M/UL — LOW (ref 3.8–5.2)
RBC # FLD: 19.5 % — HIGH (ref 10.3–14.5)
WBC # BLD: 30.62 K/UL — HIGH (ref 3.8–10.5)
WBC # FLD AUTO: 30.62 K/UL — HIGH (ref 3.8–10.5)

## 2023-03-28 PROCEDURE — 99233 SBSQ HOSP IP/OBS HIGH 50: CPT

## 2023-03-28 RX ADMIN — TAMSULOSIN HYDROCHLORIDE 0.4 MILLIGRAM(S): 0.4 CAPSULE ORAL at 20:55

## 2023-03-28 RX ADMIN — ATORVASTATIN CALCIUM 10 MILLIGRAM(S): 80 TABLET, FILM COATED ORAL at 20:55

## 2023-03-28 RX ADMIN — RIVAROXABAN 20 MILLIGRAM(S): KIT at 17:40

## 2023-03-28 NOTE — PROGRESS NOTE ADULT - PROBLEM SELECTOR PLAN 1
-Mild pedal edema but clear lungs on CXR (and exam);   -dyspnea & LE edema slowly imroving.    -cont. lasix at 40 IV daily.

## 2023-03-28 NOTE — PROGRESS NOTE ADULT - SUBJECTIVE AND OBJECTIVE BOX
HOSPITALIST ATTENDING PROGRESS NOTE    Chart and meds reviewed.  Patient seen and examined.    CC: CHF    Subjective:  Denies CP, SOB, palp.    All other systems reviewed and found to be negative with the exception of what has been described above.    MEDICATIONS  (STANDING):  aspirin enteric coated 81 milliGRAM(s) Oral daily  atorvastatin 10 milliGRAM(s) Oral at bedtime  furosemide   Injectable 40 milliGRAM(s) IV Push daily  metoprolol succinate ER 50 milliGRAM(s) Oral daily  pantoprazole    Tablet 40 milliGRAM(s) Oral before breakfast  rivaroxaban 20 milliGRAM(s) Oral with dinner  tamsulosin 0.4 milliGRAM(s) Oral at bedtime    MEDICATIONS  (PRN):  acetaminophen     Tablet .. 650 milliGRAM(s) Oral every 6 hours PRN Temp greater or equal to 38C (100.4F), Mild Pain (1 - 3)  aluminum hydroxide/magnesium hydroxide/simethicone Suspension 30 milliLiter(s) Oral every 4 hours PRN Dyspepsia  melatonin 3 milliGRAM(s) Oral at bedtime PRN Insomnia  ondansetron Injectable 4 milliGRAM(s) IV Push every 8 hours PRN Nausea and/or Vomiting      VITALS:  T(F): 97.6 (03-28-23 @ 20:38), Max: 98 (03-28-23 @ 16:54)  HR: 94 (03-28-23 @ 20:38) (94 - 114)  BP: 127/71 (03-28-23 @ 20:38) (111/70 - 127/71)  RR: 18 (03-28-23 @ 20:38) (18 - 18)  SpO2: 100% (03-28-23 @ 20:38) (100% - 100%)  Wt(kg): --    I&O's Summary      CAPILLARY BLOOD GLUCOSE          PHYSICAL EXAM:  Gen: NAD  HEENT:  pupils equal and reactive, EOMI, no oropharyngeal lesions, erythema, exudates, oral thrush  NECK:   supple, no carotid bruits, no palpable lymph nodes, no thyromegaly  CV:  +S1, +S2, regular, no murmurs or rubs  RESP:   lungs clear to auscultation bilaterally, no wheezing, rales, rhonchi, good air entry bilaterally  BREAST:  not examined  GI:  abdomen soft, non-tender, non-distended, normal BS, no bruits, no abdominal masses, no palpable masses  RECTAL:  not examined  :  not examined  MSK:   normal muscle tone, no atrophy, no rigidity, no contractions  EXT: 1+ MARY ALICE  VASCULAR:  pulses equal and symmetric in the upper and lower extremities  NEURO:  AAOX3, no focal neurological deficits, follows all commands, able to move extremities spontaneously  SKIN:  no ulcers, lesions or rashes    LABS:                            7.9    30.62 )-----------( 558      ( 28 Mar 2023 07:30 )             26.4     03-27    139  |  101  |  33<H>  ----------------------------<  122<H>  4.3   |  34<H>  |  0.68    Ca    8.4<L>      27 Mar 2023 07:02    CULTURES:  UCx<10K    Additional results/Imaging, I have personally reviewed:  CT a/p nonconn 3/23/23:  Nonobstructive right renal calculus.  Interval passage of right ureteral calculus.  No hydronephrosis.  Other findings appear stable.    CXR 3/23/23: cardiomegaly    Echo 3/15/23: The aortic valve is well visualized, appears mildly calcified. Valve opening seems to be normal. The ascending aorta appears to be mildly dilated. The left atrium appears moderately dilated. The left ventricle is normal in size, wall thickness, wall motion and contractility. Estimated left ventricular ejection fraction is 60 %. IVC is dilated and not collapsing with inspiration. Mild mitral annular calcification is present. There is thickening of both mitral valve leaflets. The leaflet opening is normal. Mild (1+) mitral regurgitation is present. A small pericardial effusion is present. Pleural effusion - is present. Mild to moderate pulmonic valvular regurgitation (1+) is present. The right atrium appears mildly dilated. Normal appearing right ventricle structure and function. Moderate (2+) tricuspid valve regurgitation is present.    Telemetry, personally reviewed:  3/28/23: Afib 110-115, PVCs

## 2023-03-28 NOTE — PROGRESS NOTE ADULT - SUBJECTIVE AND OBJECTIVE BOX
HPI:  CHIEF COMPLAINT: Resolving abdominal pain, RLE bruising    HPI:  71 year old woman with a history of atrial fibrillation, small-medium pericardial effusion, myelofibrosis, recent pneumonia, renal stone who presented to the ER for the evaluation of lower abdominal / pelvic discomfort.  Cardiology consult requested for CHF.  Mrs Moncada describes mild bilateral distal leg and foot edema -- mildly bothersome; no clear exacerbating or alleviating factors.  She denies dyspnea (says she is fine when she uses supplemental O2 -- has been using since recent [facility] hospitalization for pneumonia); no orthopnea.  During her last hospitalization earlier this month I spoke to her outpatient cardiologist, Dr Khris Osorio ([place]).    3/27/23: denies chest pain/SOB/Palpitations, c/o B/L LE edema; Tele: Afib   3/28/'23: dyspnea improving.    MEDICATIONS:  OUTPATIENT  Home Medications:  Aspir 81 oral delayed release tablet: 1 tab(s) orally once a day (23 Mar 2023 23:20)  atorvastatin 10 mg oral tablet: 1 tab(s) orally once a day (23 Mar 2023 22:20)  fedratinib 100 mg oral capsule: 2 cap(s) orally once a day   (23 Mar 2023 22:20)  furosemide 20 mg oral tablet: 1 tab(s) orally once a day (23 Mar 2023 23:20)  Metoprolol Succinate ER 50 mg oral tablet, extended release: 1 tab(s) orally once a day (23 Mar 2023 22:20)  omeprazole 20 mg oral delayed release capsule: 1 cap(s) orally every other day (23 Mar 2023 23:20)      INPATIENT  MEDICATIONS  (STANDING):  aspirin enteric coated 81 milliGRAM(s) Oral daily  atorvastatin 10 milliGRAM(s) Oral at bedtime  furosemide   Injectable 40 milliGRAM(s) IV Push daily  metoprolol succinate ER 50 milliGRAM(s) Oral daily  pantoprazole    Tablet 40 milliGRAM(s) Oral before breakfast  rivaroxaban 20 milliGRAM(s) Oral with dinner  tamsulosin 0.4 milliGRAM(s) Oral at bedtime    MEDICATIONS  (PRN):  acetaminophen     Tablet .. 650 milliGRAM(s) Oral every 6 hours PRN Temp greater or equal to 38C (100.4F), Mild Pain (1 - 3)  aluminum hydroxide/magnesium hydroxide/simethicone Suspension 30 milliLiter(s) Oral every 4 hours PRN Dyspepsia  melatonin 3 milliGRAM(s) Oral at bedtime PRN Insomnia  ondansetron Injectable 4 milliGRAM(s) IV Push every 8 hours PRN Nausea and/or Vomiting    Vital Signs Last 24 Hrs  T(C): 36.7 (28 Mar 2023 16:54), Max: 36.7 (28 Mar 2023 16:54)  T(F): 98 (28 Mar 2023 16:54), Max: 98 (28 Mar 2023 16:54)  HR: 114 (28 Mar 2023 16:54) (114 - 114)  BP: 111/70 (28 Mar 2023 16:54) (111/70 - 111/70)  BP(mean): --  RR: 18 (28 Mar 2023 16:54) (18 - 18)  SpO2: 100% (28 Mar 2023 16:54) (100% - 100%)    Parameters below as of 28 Mar 2023 16:54  Patient On (Oxygen Delivery Method): nasal cannula  O2 Flow (L/min): 2  Daily     Daily I&O's Summary      I&O's Detail      I&O's Summary      PHYSICAL EXAM:    Constitutional: NAD, awake   HEENT: PERR, EOMI,  No oral cyanosis.  Neck:  supple,  No JVD  Respiratory: Breath sounds are clear bilaterally, No wheezing, rales or rhonchi  Cardiovascular: S1 and S2, regular rate and rhythm, no Murmurs, gallops or rubs  Gastrointestinal: Bowel Sounds present, soft, nontender.   Extremities: No peripheral edema. No clubbing or cyanosis.  Vascular: 2+ peripheral pulses  Neurological: A/O x 3, no focal deficits      ===============================  ===============================  LABS:                         7.9    30.62 )-----------( 558      ( 28 Mar 2023 07:30 )             26.4                         7.2    33.04 )-----------( 544      ( 27 Mar 2023 07:02 )             23.8                         6.3    x     )-----------( x        ( 26 Mar 2023 12:33 )             20.7     27 Mar 2023 07:02    139    |  101    |  33     ----------------------------<  122    4.3     |  34     |  0.68   26 Mar 2023 06:27    143    |  106    |  37     ----------------------------<  103    5.0     |  35     |  0.67     Ca    8.4        27 Mar 2023 07:02  Ca    8.8        26 Mar 2023 06:27        ===============================  ===============================  CARDIAC BIOMARKERS:  BNP    TROPONIN  Troponin I, High Sensitivity Result: 7.24 ng/L (03-14-23 @ 20:54)    ===============================  ===============================  Pro-Brain Natriuretic Peptide: 2989 pg/mL (03.26.23 @ 06:27)   Pro-Brain Natriuretic Peptide: 3299 pg/mL (03.23.23 @ 19:40)   Pro-Brain Natriuretic Peptide: 4152 pg/mL (03.14.23 @ 20:54) Pro-Brain Natriuretic Peptide (03.26.23 @ 06:27)     TTE Echo Complete w/o Contrast w/ Doppler (03.15.23 @ 11:22):   The aortic valve is well visualized, appears mildly calcified. Valve opening seems to be normal.   The ascending aorta appears to be mildly dilated.   The left atrium appears moderately dilated.   The left ventricle is normal in size, wall thickness, wall motion and contractility. Estimated left ventricular ejection fraction is 60 %.   IVC is dilated and not collapsing with inspiration.   Mild mitral annular calcification is present.   There is thickening of both mitral valve leaflets. The leaflet opening is normal. Mild (1+) mitral regurgitation is present.   A small pericardial effusion is present.   Pleural effusion - is present.   Mild to moderate pulmonic valvular regurgitation (1+) is present.   The right atrium appears mildly dilated.   Normal appearing right ventricle structure and function. Moderate (2+) tricuspid valve regurgitation is present.    Tele:   bpm at time of encounter    Xray Chest 1 View-PORTABLE IMMEDIATE (Xray Chest 1 View-PORTABLE IMMEDIATE .) (03.23.23 @ 20:00):  Heart is enlarged.  The lung fields and pleural surfaces are unremarkable.  ===============================    Celestine Leon M.D.  Cardiology, [facility]  Cell: [phone]  Offices:   [phone] ([facility] Office)  [phone] ([facility] Office)

## 2023-03-28 NOTE — PROGRESS NOTE ADULT - REASON FOR ADMISSION
CHF exacerbation and UTI
Take medication as prescribed.  Drink plenty of fluids.  Continue alternating Tylenol and ibuprofen for your pain and fever.  Follow-up with your primary care provider as needed.  Return for new or worsening symptoms.  
CHF exacerbation and UTI

## 2023-03-28 NOTE — PROGRESS NOTE ADULT - PROBLEM SELECTOR PLAN 2
·  Problem: Atrial fibrillation.   ·  Recommendation: Rate mildly elevated; unable to titrate metoprolol due to low BP; if persistent tachycardia, can add digoxin.  Continue anticoagulation.
-Rate mildly elevated; likely approprite in setting of CHF  -cont. metoprolol at current dose, cont. NOAC.

## 2023-03-28 NOTE — PROGRESS NOTE ADULT - ASSESSMENT
71 year old woman with a history of atrial fibrillation, small-medium pericardial effusion, myelofibrosis, recent pneumonia, renal stone who presented to the ER for the evaluation of lower abdominal / pelvic discomfort.  Cardiology consult requested for CHF.  Mrs Moncada describes mild bilateral distal leg and foot edema -- mildly bothersome; no clear exacerbating or alleviating factors.  She denies dyspnea (says she is fine when she uses supplemental O2 -- has been using since recent [facility] hospitalization for pneumonia); no orthopnea.  During her last hospitalization earlier this month I spoke to her outpatient cardiologist, Dr Khris Osorio ([place]).    #Mild acute on chronic HFpEF  -on 2L (has at home for recent PNA)  -pedal edema improving  -BNP downtrending  -echo w preserved EF  -iv lasix, will likely change to po tmrw    #Afib w RVR  -titrate BB as able, if unable, will consider dig  -Xarelto    #borderline hypotension  -monitor in setting of meds as above    #Anemia in setting of myelofibrosis  -s/p pRBC  -Hgb stable    #DVT ppx- Xarelto    D/c 1-2 days pending diuresis and HR control  Updated family at bedside

## 2023-03-29 ENCOUNTER — TRANSCRIPTION ENCOUNTER (OUTPATIENT)
Age: 73
End: 2023-03-29

## 2023-03-29 VITALS
HEART RATE: 112 BPM | TEMPERATURE: 98 F | OXYGEN SATURATION: 98 % | RESPIRATION RATE: 18 BRPM | DIASTOLIC BLOOD PRESSURE: 68 MMHG | SYSTOLIC BLOOD PRESSURE: 114 MMHG

## 2023-03-29 LAB
ANION GAP SERPL CALC-SCNC: 5 MMOL/L — SIGNIFICANT CHANGE UP (ref 5–17)
BUN SERPL-MCNC: 28 MG/DL — HIGH (ref 7–23)
CALCIUM SERPL-MCNC: 8 MG/DL — LOW (ref 8.5–10.1)
CHLORIDE SERPL-SCNC: 101 MMOL/L — SIGNIFICANT CHANGE UP (ref 96–108)
CO2 SERPL-SCNC: 35 MMOL/L — HIGH (ref 22–31)
CREAT SERPL-MCNC: 0.62 MG/DL — SIGNIFICANT CHANGE UP (ref 0.5–1.3)
EGFR: 95 ML/MIN/1.73M2 — SIGNIFICANT CHANGE UP
GLUCOSE SERPL-MCNC: 97 MG/DL — SIGNIFICANT CHANGE UP (ref 70–99)
HCT VFR BLD CALC: 24.7 % — LOW (ref 34.5–45)
HGB BLD-MCNC: 7.6 G/DL — LOW (ref 11.5–15.5)
MCHC RBC-ENTMCNC: 27.6 PG — SIGNIFICANT CHANGE UP (ref 27–34)
MCHC RBC-ENTMCNC: 30.8 GM/DL — LOW (ref 32–36)
MCV RBC AUTO: 89.8 FL — SIGNIFICANT CHANGE UP (ref 80–100)
PLATELET # BLD AUTO: 525 K/UL — HIGH (ref 150–400)
POTASSIUM SERPL-MCNC: 3.4 MMOL/L — LOW (ref 3.5–5.3)
POTASSIUM SERPL-SCNC: 3.4 MMOL/L — LOW (ref 3.5–5.3)
RBC # BLD: 2.75 M/UL — LOW (ref 3.8–5.2)
RBC # FLD: 19.9 % — HIGH (ref 10.3–14.5)
SODIUM SERPL-SCNC: 141 MMOL/L — SIGNIFICANT CHANGE UP (ref 135–145)
WBC # BLD: 23.71 K/UL — HIGH (ref 3.8–10.5)
WBC # FLD AUTO: 23.71 K/UL — HIGH (ref 3.8–10.5)

## 2023-03-29 PROCEDURE — 99239 HOSP IP/OBS DSCHRG MGMT >30: CPT

## 2023-03-29 RX ORDER — FUROSEMIDE 40 MG
1 TABLET ORAL
Qty: 0 | Refills: 0 | DISCHARGE

## 2023-03-29 RX ORDER — FUROSEMIDE 40 MG
2 TABLET ORAL
Qty: 60 | Refills: 0
Start: 2023-03-29 | End: 2023-04-27

## 2023-03-29 RX ORDER — POTASSIUM CHLORIDE 20 MEQ
40 PACKET (EA) ORAL ONCE
Refills: 0 | Status: COMPLETED | OUTPATIENT
Start: 2023-03-29 | End: 2023-03-29

## 2023-03-29 RX ADMIN — PANTOPRAZOLE SODIUM 40 MILLIGRAM(S): 20 TABLET, DELAYED RELEASE ORAL at 05:40

## 2023-03-29 RX ADMIN — Medication 40 MILLIEQUIVALENT(S): at 11:33

## 2023-03-29 RX ADMIN — Medication 81 MILLIGRAM(S): at 11:33

## 2023-03-29 RX ADMIN — Medication 40 MILLIGRAM(S): at 11:33

## 2023-03-29 RX ADMIN — Medication 50 MILLIGRAM(S): at 11:34

## 2023-03-29 NOTE — DISCHARGE NOTE PROVIDER - NS AS DC PROVIDER CONTACT Y/N MULTI
FUTURE VISIT INFORMATION      FUTURE VISIT INFORMATION:    Date: 7/12/21    Time: 1:15pm    Location: [facility]  REFERRAL INFORMATION:    Referring provider:   Dr. Meyers     Referring providers clinic:  [facility]  Reason for visit/diagnosis  chronic canacolitis   RECORDS REQUESTED FROM:         Clinic name Comments Records Status Imaging Status   [facility] Request for recs sent 2/4- recs received and sent to scanning 2/5 [facility]/note 11/10/14-2/28/14 EPIC              Yes

## 2023-03-29 NOTE — DISCHARGE NOTE PROVIDER - HOSPITAL COURSE
CC: CHF  HPI and Hospital Course: 71 year old woman with a history of atrial fibrillation, small-medium pericardial effusion, myelofibrosis, recent pneumonia, renal stone who presented to the ER for the evaluation of lower abdominal / pelvic discomfort.  Cardiology consult requested for CHF.  Mrs Moncada describes mild bilateral distal leg and foot edema -- mildly bothersome; no clear exacerbating or alleviating factors.  She denies dyspnea (says she is fine when she uses supplemental O2 -- has been using since recent [facility] hospitalization for pneumonia); no orthopnea.  During her last hospitalization earlier this month I spoke to her outpatient cardiologist, Dr Khris Osorio ([place]).    Treated for CHF exacerbation with improvement.     VITALS:  T(F): 98.4 (03-29-23 @ 07:08), Max: 98.4 (03-29-23 @ 07:08)  HR: 112 (03-29-23 @ 07:08) (94 - 114)  BP: 114/68 (03-29-23 @ 07:08) (111/70 - 127/71)  RR: 18 (03-29-23 @ 07:08) (18 - 18)  SpO2: 98% (03-29-23 @ 07:08) (98% - 100%)    PHYSICAL EXAM:  Gen: NAD  HEENT:  pupils equal and reactive, EOMI, no oropharyngeal lesions, erythema, exudates, oral thrush  NECK:   supple, no carotid bruits, no palpable lymph nodes, no thyromegaly  CV:  +S1, +S2, regular, no murmurs or rubs  RESP:   lungs clear to auscultation bilaterally, no wheezing, rales, rhonchi, good air entry bilaterally  BREAST:  not examined  GI:  abdomen soft, non-tender, non-distended, normal BS, no bruits, no abdominal masses, no palpable masses  RECTAL:  not examined  :  not examined  MSK:   normal muscle tone, no atrophy, no rigidity, no contractions  EXT:  no clubbing, no cyanosis, no edema, no calf pain, swelling or erythema  VASCULAR:  pulses equal and symmetric in the upper and lower extremities  NEURO:  AAOX3, no focal neurological deficits, follows all commands, able to move extremities spontaneously  SKIN:  no ulcers, lesions or rashes    UCx<10K    Additional results/Imaging, I have personally reviewed:  CT a/p nonconn 3/23/23:  Nonobstructive right renal calculus.  Interval passage of right ureteral calculus.  No hydronephrosis.  Other findings appear stable.    CXR 3/23/23: cardiomegaly    Echo 3/15/23: The aortic valve is well visualized, appears mildly calcified. Valve opening seems to be normal. The ascending aorta appears to be mildly dilated. The left atrium appears moderately dilated. The left ventricle is normal in size, wall thickness, wall motion and contractility. Estimated left ventricular ejection fraction is 60 %. IVC is dilated and not collapsing with inspiration. Mild mitral annular calcification is present. There is thickening of both mitral valve leaflets. The leaflet opening is normal. Mild (1+) mitral regurgitation is present. A small pericardial effusion is present. Pleural effusion - is present. Mild to moderate pulmonic valvular regurgitation (1+) is present. The right atrium appears mildly dilated. Normal appearing right ventricle structure and function. Moderate (2+) tricuspid valve regurgitation is present.    #Mild acute on chronic HFpEF  -on 2L (has at home for recent PNA)  -pedal edema improving  -BNP downtrending  -echo w preserved EF  -iv lasix inpt, d/c on lasix 40mg po qd    #Afib w RVR  -metop  -Xarelto    #borderline hypotension  -monitor in setting of meds as above    #Anemia in setting of myelofibrosis  -s/p pRBC  -Hgb stable    #DVT ppx- Xarelto    Updated family at bedside  F2F  I have spent 33 min on day of d/c coordinating care.

## 2023-03-29 NOTE — DISCHARGE NOTE PROVIDER - PROVIDER TOKENS
PROVIDER:[TOKEN:[[id number]],FOLLOWUP:[1-3 days]],PROVIDER:[TOKEN:[[id number]],FOLLOWUP:[1 week]]

## 2023-03-29 NOTE — DISCHARGE NOTE PROVIDER - CARE PROVIDER_API CALL
Khris Osorio)  Cardiology  [address] [place] [address]  Phone: ()-  Fax: ()-  Follow Up Time: 1-3 days    CHUCK DESOUZA  Internal Medicine  [address] [place] [id number]  Phone: ()-  Fax: ()-  Follow Up Time: 1 week

## 2023-03-29 NOTE — DISCHARGE NOTE PROVIDER - NSDCMRMEDTOKEN_GEN_ALL_CORE_FT
acetaminophen 325 mg oral tablet: 2 tab every 6 hrs as needed for pain  Aspir 81 oral delayed release tablet: 1 tab(s) orally once a day  atorvastatin 10 mg oral tablet: 1 tab(s) orally once a day  fedratinib 100 mg oral capsule: 2 cap(s) orally once a day    furosemide 20 mg oral tablet: 2 tab(s) orally once a day  Metoprolol Succinate ER 50 mg oral tablet, extended release: 1 tab(s) orally once a day  omeprazole 20 mg oral delayed release capsule: 1 cap(s) orally every other day  rivaroxaban 15 mg oral tablet: 1 tab(s) orally once a day (before a meal)  tamsulosin 0.4 mg oral capsule: 1 cap(s) orally once a day (at bedtime)

## 2023-03-29 NOTE — DISCHARGE NOTE PROVIDER - NSDCFUSCHEDAPPT_GEN_ALL_CORE_FT
Ilya Valdez  [facility]  UROLOGY [address]  Scheduled Appointment: 04/07/2023    Celestine Leon  [facility]  CARDIOLOGY [address]  Scheduled Appointment: 04/13/2023

## 2023-03-29 NOTE — DISCHARGE NOTE PROVIDER - NSDCCPCAREPLAN_GEN_ALL_CORE_FT
PRINCIPAL DISCHARGE DIAGNOSIS  Diagnosis: CHF exacerbation  Assessment and Plan of Treatment: Take lasix 40mg daily. Follow up with Dr. Osorio

## 2023-03-30 ENCOUNTER — TRANSCRIPTION ENCOUNTER (OUTPATIENT)
Age: 73
End: 2023-03-30

## 2023-03-31 ENCOUNTER — TRANSCRIPTION ENCOUNTER (OUTPATIENT)
Age: 73
End: 2023-03-31

## 2023-04-02 DIAGNOSIS — N39.0 URINARY TRACT INFECTION, SITE NOT SPECIFIED: ICD-10-CM

## 2023-04-02 DIAGNOSIS — I95.9 HYPOTENSION, UNSPECIFIED: ICD-10-CM

## 2023-04-02 DIAGNOSIS — I08.1 RHEUMATIC DISORDERS OF BOTH MITRAL AND TRICUSPID VALVES: ICD-10-CM

## 2023-04-02 DIAGNOSIS — I48.20 CHRONIC ATRIAL FIBRILLATION, UNSPECIFIED: ICD-10-CM

## 2023-04-02 DIAGNOSIS — I37.1 NONRHEUMATIC PULMONARY VALVE INSUFFICIENCY: ICD-10-CM

## 2023-04-02 DIAGNOSIS — I31.39 OTHER PERICARDIAL EFFUSION (NONINFLAMMATORY): ICD-10-CM

## 2023-04-02 DIAGNOSIS — Z20.822 CONTACT WITH AND (SUSPECTED) EXPOSURE TO COVID-19: ICD-10-CM

## 2023-04-02 DIAGNOSIS — Z79.01 LONG TERM (CURRENT) USE OF ANTICOAGULANTS: ICD-10-CM

## 2023-04-02 DIAGNOSIS — D64.9 ANEMIA, UNSPECIFIED: ICD-10-CM

## 2023-04-02 DIAGNOSIS — Z79.82 LONG TERM (CURRENT) USE OF ASPIRIN: ICD-10-CM

## 2023-04-02 DIAGNOSIS — D75.81 MYELOFIBROSIS: ICD-10-CM

## 2023-04-02 DIAGNOSIS — I50.33 ACUTE ON CHRONIC DIASTOLIC (CONGESTIVE) HEART FAILURE: ICD-10-CM

## 2023-04-02 DIAGNOSIS — Z99.81 DEPENDENCE ON SUPPLEMENTAL OXYGEN: ICD-10-CM

## 2023-04-03 ENCOUNTER — TRANSCRIPTION ENCOUNTER (OUTPATIENT)
Age: 73
End: 2023-04-03

## 2023-04-07 ENCOUNTER — APPOINTMENT (OUTPATIENT)
Dept: UROLOGY | Facility: CLINIC | Age: 73
End: 2023-04-07
Payer: MEDICARE

## 2023-04-07 VITALS
SYSTOLIC BLOOD PRESSURE: 119 MMHG | OXYGEN SATURATION: 87 % | HEIGHT: 60 IN | DIASTOLIC BLOOD PRESSURE: 65 MMHG | HEART RATE: 106 BPM | BODY MASS INDEX: 23.56 KG/M2 | WEIGHT: 120 LBS

## 2023-04-07 DIAGNOSIS — N28.1 CYST OF KIDNEY, ACQUIRED: ICD-10-CM

## 2023-04-07 DIAGNOSIS — N20.0 CALCULUS OF KIDNEY: ICD-10-CM

## 2023-04-07 DIAGNOSIS — R35.0 FREQUENCY OF MICTURITION: ICD-10-CM

## 2023-04-07 DIAGNOSIS — N20.1 CALCULUS OF URETER: ICD-10-CM

## 2023-04-07 PROCEDURE — 99214 OFFICE O/P EST MOD 30 MIN: CPT

## 2023-04-08 ENCOUNTER — INPATIENT (INPATIENT)
Facility: HOSPITAL | Age: 73
LOS: 4 days | Discharge: HOME | DRG: 291 | End: 2023-04-13
Attending: STUDENT IN AN ORGANIZED HEALTH CARE EDUCATION/TRAINING PROGRAM | Admitting: INTERNAL MEDICINE
Payer: MEDICARE

## 2023-04-08 VITALS
OXYGEN SATURATION: 86 % | SYSTOLIC BLOOD PRESSURE: 129 MMHG | DIASTOLIC BLOOD PRESSURE: 67 MMHG | HEIGHT: 60 IN | HEART RATE: 110 BPM | TEMPERATURE: 100 F | RESPIRATION RATE: 18 BRPM

## 2023-04-08 DIAGNOSIS — I50.9 HEART FAILURE, UNSPECIFIED: ICD-10-CM

## 2023-04-08 LAB
ADD ON TEST-SPECIMEN IN LAB: SIGNIFICANT CHANGE UP
ALBUMIN SERPL ELPH-MCNC: 3.2 G/DL — LOW (ref 3.3–5)
ALP SERPL-CCNC: 173 U/L — HIGH (ref 40–120)
ALT FLD-CCNC: 17 U/L — SIGNIFICANT CHANGE UP (ref 12–78)
ANION GAP SERPL CALC-SCNC: 5 MMOL/L — SIGNIFICANT CHANGE UP (ref 5–17)
ANISOCYTOSIS BLD QL: SIGNIFICANT CHANGE UP
APTT BLD: 36.8 SEC — HIGH (ref 27.5–35.5)
AST SERPL-CCNC: 25 U/L — SIGNIFICANT CHANGE UP (ref 15–37)
BASE EXCESS BLDV CALC-SCNC: 5.8 MMOL/L — SIGNIFICANT CHANGE UP
BASOPHILS # BLD AUTO: 0 K/UL — SIGNIFICANT CHANGE UP (ref 0–0.2)
BASOPHILS NFR BLD AUTO: 0 % — SIGNIFICANT CHANGE UP (ref 0–2)
BILIRUB SERPL-MCNC: 0.9 MG/DL — SIGNIFICANT CHANGE UP (ref 0.2–1.2)
BUN SERPL-MCNC: 29 MG/DL — HIGH (ref 7–23)
CALCIUM SERPL-MCNC: 8.3 MG/DL — LOW (ref 8.5–10.1)
CHLORIDE SERPL-SCNC: 100 MMOL/L — SIGNIFICANT CHANGE UP (ref 96–108)
CO2 BLDV-SCNC: 33 MMOL/L — HIGH (ref 22–26)
CO2 SERPL-SCNC: 31 MMOL/L — SIGNIFICANT CHANGE UP (ref 22–31)
CREAT SERPL-MCNC: 0.73 MG/DL — SIGNIFICANT CHANGE UP (ref 0.5–1.3)
DIGOXIN SERPL-MCNC: 0.93 NG/ML — SIGNIFICANT CHANGE UP (ref 0.8–2)
EGFR: 87 ML/MIN/1.73M2 — SIGNIFICANT CHANGE UP
ELLIPTOCYTES BLD QL SMEAR: SLIGHT — SIGNIFICANT CHANGE UP
EOSINOPHIL # BLD AUTO: 0 K/UL — SIGNIFICANT CHANGE UP (ref 0–0.5)
EOSINOPHIL NFR BLD AUTO: 0 % — SIGNIFICANT CHANGE UP (ref 0–6)
GLUCOSE SERPL-MCNC: 144 MG/DL — HIGH (ref 70–99)
HCO3 BLDV-SCNC: 32 MMOL/L — HIGH (ref 22–29)
HCT VFR BLD CALC: 23.4 % — LOW (ref 34.5–45)
HGB BLD-MCNC: 7.1 G/DL — LOW (ref 11.5–15.5)
INR BLD: 1.37 RATIO — HIGH (ref 0.88–1.16)
LYMPHOCYTES # BLD AUTO: 3.02 K/UL — SIGNIFICANT CHANGE UP (ref 1–3.3)
LYMPHOCYTES # BLD AUTO: 8 % — LOW (ref 13–44)
MAGNESIUM SERPL-MCNC: 1.4 MG/DL — LOW (ref 1.6–2.6)
MANUAL SMEAR VERIFICATION: SIGNIFICANT CHANGE UP
MCHC RBC-ENTMCNC: 28.4 PG — SIGNIFICANT CHANGE UP (ref 27–34)
MCHC RBC-ENTMCNC: 30.3 GM/DL — LOW (ref 32–36)
MCV RBC AUTO: 93.6 FL — SIGNIFICANT CHANGE UP (ref 80–100)
METAMYELOCYTES # FLD: 6 % — HIGH (ref 0–0)
MONOCYTES # BLD AUTO: 1.13 K/UL — HIGH (ref 0–0.9)
MONOCYTES NFR BLD AUTO: 3 % — SIGNIFICANT CHANGE UP (ref 2–14)
MYELOCYTES NFR BLD: 5 % — HIGH (ref 0–0)
NEUTROPHILS # BLD AUTO: 29.47 K/UL — HIGH (ref 1.8–7.4)
NEUTROPHILS NFR BLD AUTO: 76 % — SIGNIFICANT CHANGE UP (ref 43–77)
NEUTS BAND # BLD: 2 % — SIGNIFICANT CHANGE UP (ref 0–8)
NRBC # BLD: 1 /100 — HIGH (ref 0–0)
NRBC # BLD: SIGNIFICANT CHANGE UP /100 WBCS (ref 0–0)
NT-PROBNP SERPL-SCNC: 4436 PG/ML — HIGH (ref 0–125)
PCO2 BLDV: 50 MMHG — HIGH (ref 39–42)
PH BLDV: 7.41 — SIGNIFICANT CHANGE UP (ref 7.32–7.43)
PLAT MORPH BLD: NORMAL — SIGNIFICANT CHANGE UP
PLATELET # BLD AUTO: 726 K/UL — HIGH (ref 150–400)
PO2 BLDV: 72 MMHG — SIGNIFICANT CHANGE UP
POIKILOCYTOSIS BLD QL AUTO: SIGNIFICANT CHANGE UP
POLYCHROMASIA BLD QL SMEAR: SIGNIFICANT CHANGE UP
POTASSIUM SERPL-MCNC: 4 MMOL/L — SIGNIFICANT CHANGE UP (ref 3.5–5.3)
POTASSIUM SERPL-SCNC: 4 MMOL/L — SIGNIFICANT CHANGE UP (ref 3.5–5.3)
PROT SERPL-MCNC: 7.3 GM/DL — SIGNIFICANT CHANGE UP (ref 6–8.3)
PROTHROM AB SERPL-ACNC: 15.9 SEC — HIGH (ref 10.5–13.4)
RAPID RVP RESULT: SIGNIFICANT CHANGE UP
RBC # BLD: 2.5 M/UL — LOW (ref 3.8–5.2)
RBC # FLD: 25.9 % — HIGH (ref 10.3–14.5)
RBC BLD AUTO: ABNORMAL
SAO2 % BLDV: 95.6 % — SIGNIFICANT CHANGE UP
SARS-COV-2 RNA SPEC QL NAA+PROBE: SIGNIFICANT CHANGE UP
SODIUM SERPL-SCNC: 136 MMOL/L — SIGNIFICANT CHANGE UP (ref 135–145)
TROPONIN I, HIGH SENSITIVITY RESULT: 20.7 NG/L — SIGNIFICANT CHANGE UP
WBC # BLD: 37.78 K/UL — HIGH (ref 3.8–10.5)
WBC # FLD AUTO: 37.78 K/UL — HIGH (ref 3.8–10.5)

## 2023-04-08 RX ORDER — ASPIRIN/CALCIUM CARB/MAGNESIUM 324 MG
81 TABLET ORAL DAILY
Refills: 0 | Status: DISCONTINUED | OUTPATIENT
Start: 2023-04-08 | End: 2023-04-13

## 2023-04-08 RX ORDER — ALBUTEROL 90 UG/1
2 AEROSOL, METERED ORAL EVERY 6 HOURS
Refills: 0 | Status: DISCONTINUED | OUTPATIENT
Start: 2023-04-08 | End: 2023-04-13

## 2023-04-08 RX ORDER — METOPROLOL TARTRATE 50 MG
2.5 TABLET ORAL ONCE
Refills: 0 | Status: COMPLETED | OUTPATIENT
Start: 2023-04-08 | End: 2023-04-08

## 2023-04-08 RX ORDER — CHOLECALCIFEROL (VITAMIN D3) 125 MCG
1000 CAPSULE ORAL DAILY
Refills: 0 | Status: DISCONTINUED | OUTPATIENT
Start: 2023-04-08 | End: 2023-04-13

## 2023-04-08 RX ORDER — ACETAMINOPHEN 500 MG
650 TABLET ORAL EVERY 6 HOURS
Refills: 0 | Status: DISCONTINUED | OUTPATIENT
Start: 2023-04-08 | End: 2023-04-13

## 2023-04-08 RX ORDER — FUROSEMIDE 40 MG
40 TABLET ORAL DAILY
Refills: 0 | Status: DISCONTINUED | OUTPATIENT
Start: 2023-04-08 | End: 2023-04-10

## 2023-04-08 RX ORDER — FEDRATINIB HYDROCHLORIDE 100 MG/1
2 CAPSULE ORAL
Qty: 0 | Refills: 0 | DISCHARGE

## 2023-04-08 RX ORDER — ATORVASTATIN CALCIUM 80 MG/1
1 TABLET, FILM COATED ORAL
Qty: 0 | Refills: 0 | DISCHARGE

## 2023-04-08 RX ORDER — ATORVASTATIN CALCIUM 80 MG/1
10 TABLET, FILM COATED ORAL AT BEDTIME
Refills: 0 | Status: DISCONTINUED | OUTPATIENT
Start: 2023-04-08 | End: 2023-04-13

## 2023-04-08 RX ORDER — PANTOPRAZOLE SODIUM 20 MG/1
40 TABLET, DELAYED RELEASE ORAL
Refills: 0 | Status: DISCONTINUED | OUTPATIENT
Start: 2023-04-08 | End: 2023-04-13

## 2023-04-08 RX ORDER — BUDESONIDE AND FORMOTEROL FUMARATE DIHYDRATE 160; 4.5 UG/1; UG/1
2 AEROSOL RESPIRATORY (INHALATION)
Refills: 0 | Status: DISCONTINUED | OUTPATIENT
Start: 2023-04-08 | End: 2023-04-13

## 2023-04-08 RX ORDER — RIVAROXABAN 15 MG-20MG
15 KIT ORAL
Refills: 0 | Status: DISCONTINUED | OUTPATIENT
Start: 2023-04-08 | End: 2023-04-13

## 2023-04-08 RX ORDER — LANOLIN ALCOHOL/MO/W.PET/CERES
3 CREAM (GRAM) TOPICAL AT BEDTIME
Refills: 0 | Status: DISCONTINUED | OUTPATIENT
Start: 2023-04-08 | End: 2023-04-13

## 2023-04-08 RX ORDER — METOPROLOL TARTRATE 50 MG
50 TABLET ORAL DAILY
Refills: 0 | Status: DISCONTINUED | OUTPATIENT
Start: 2023-04-08 | End: 2023-04-13

## 2023-04-08 RX ORDER — TIOTROPIUM BROMIDE 18 UG/1
2 CAPSULE ORAL; RESPIRATORY (INHALATION) DAILY
Refills: 0 | Status: DISCONTINUED | OUTPATIENT
Start: 2023-04-08 | End: 2023-04-13

## 2023-04-08 RX ORDER — ONDANSETRON 8 MG/1
4 TABLET, FILM COATED ORAL EVERY 8 HOURS
Refills: 0 | Status: DISCONTINUED | OUTPATIENT
Start: 2023-04-08 | End: 2023-04-13

## 2023-04-08 RX ORDER — OMEPRAZOLE 10 MG/1
1 CAPSULE, DELAYED RELEASE ORAL
Qty: 0 | Refills: 0 | DISCHARGE

## 2023-04-08 RX ORDER — TAMSULOSIN HYDROCHLORIDE 0.4 MG/1
0.4 CAPSULE ORAL AT BEDTIME
Refills: 0 | Status: DISCONTINUED | OUTPATIENT
Start: 2023-04-08 | End: 2023-04-13

## 2023-04-08 RX ORDER — FUROSEMIDE 40 MG
80 TABLET ORAL ONCE
Refills: 0 | Status: COMPLETED | OUTPATIENT
Start: 2023-04-08 | End: 2023-04-08

## 2023-04-08 RX ORDER — DIGOXIN 250 MCG
125 TABLET ORAL DAILY
Refills: 0 | Status: DISCONTINUED | OUTPATIENT
Start: 2023-04-08 | End: 2023-04-13

## 2023-04-08 RX ADMIN — Medication 2.5 MILLIGRAM(S): at 21:48

## 2023-04-08 RX ADMIN — Medication 3 MILLIGRAM(S): at 23:18

## 2023-04-08 RX ADMIN — Medication 80 MILLIGRAM(S): at 17:45

## 2023-04-08 RX ADMIN — ATORVASTATIN CALCIUM 10 MILLIGRAM(S): 80 TABLET, FILM COATED ORAL at 23:17

## 2023-04-08 NOTE — H&P ADULT - NSHPOUTPATIENTPROVIDERS_GEN_ALL_CORE
PCP - Dr. Varma   Pulmonologist - Dr. Richter   Heme/Onc - Dr. Collado ([place])   Cardiologist - Dr. Osorio   MSK - Dr. Ruiz and Dr. Wei

## 2023-04-08 NOTE — H&P ADULT - NSHPREVIEWOFSYSTEMS_GEN_ALL_CORE
Constitutional: negative for fatigue, negative for fever, negative for chills, negative for decreased appetite.  Skin: negative for rashes, negative for open wounds, negative for jaundice.   Eyes: negative for blurry vision, negative for double vision.   Ears, nose, throat: negative for ear pain, negative for nasal congestion, negative for sore throat, negative for lymph node swelling.   Cardiovascular: negative for chest pain, positive for palpitations, negative for lower extremity swelling.   Respiratory: positive for shortness of breath, positive for wheezing, negative for cough.   Gastrointestinal: negative for abdominal pain, negative for nausea, negative for vomiting, negative for diarrhea, negative for constipation, negative for blood in the stool, negative for black tarry stools.   Genitourinary: negative for burning on urination, negative for urinary urgency or frequency, negative for blood in the urine.   Endocrine: negative for cold intolerance, negative for heat intolerance, negative for increased thirst.   Hematologic: negative for easy bruising or bleeding.   Musculoskeletal: negative for muscle/joint pain, negative for decreased range of motion.   Neurological: negative for dizziness, negative for headaches, negative for loss of consciousness, negative for motor weakness, negative for sensory deficits.   Psychiatric: negative for depression, negative for anxiety.

## 2023-04-08 NOTE — ED ADULT NURSE REASSESSMENT NOTE - NS ED NURSE REASSESS COMMENT FT1
Pt A&Ox4, VSS. NSR/ST on cardiac monitor, O2 saturation >93% on 3L NC, pt states mild SOB but improving since medication administration. Pt with clear/yellow urine output. NAD at this time, safety maintained. Admission pending.

## 2023-04-08 NOTE — H&P ADULT - ASSESSMENT
73 y/o F presented with shortness of breath     1. Acute on chronic hypoxic/hypercapneic respiratory failure secondary to acute CHF on CHFpEF   - Telemetry observation   - c/w supplemental O2 to keep SpO2 88-92%   - SpO2 96% on 3L nasal cannula (pt on 1L during the day and 2L at night at home)   - No expiratory wheezing to suggest COPD exacerbation; however, pt with CO2 retention on VBG, c/w inhalers   - ECHO (3/15/23): LVEF 60%, mild MR, small pericardial effusion, pleural effusion present, mild to moderate pulmonic regurgitation, moderate 2+ TR   - BNP 4436, pt is fluid overloaded on exam   - Pt refusing CT chest   - s/p 80 mg IVP lasix in the ER   - c/w 40 mg IVP lasix daily   - c/w home medications: beta blockers    - Ordered procalcitonin to r/o superimposed bacterial PNA   - Strict I+Os, daily weights   - 2L H20 restriction, 2g sodium restriction      - Keep K > 4 and Mg > 2    - Cardiology consult - Dr. Leon     2. Normocytic anemia likely anemia of chronic disease in the setting of myelofibrosis   - Hb 7.1 (baseline ~8), monitor closely   - No hx of bloody stools or tarry stools   - Ordered fecal occult, type and screen    - Blood consent in chart if H+H drops   - Transfuse for Hb < 7     3. Hyperglycemia   - Glucose 144, ordered HbA1c     4. Hypoalbuminemia   - Albumin 3.2   - Nutrition consult     5. History of atrial fibrillation, small-medium pericardial effusion, myelofibrosis, recent pneumonia, renal stone, CHFpEF, mild COPD, on supplemental O2 (1L during the day and 2L at night since hospitalization at [facility]/[place] for PNA)  - c/w home medications; verified with pt at the bedside   - , will give 1 dose of 2.5 mg lopressor -> monitor on telemetry   - WBC 37.78, , metamyelocytes 6%, myelocytes 5%, alkaline phosphatase 173 -> secondary to meylofibrosis, trend -> pt to start new treatment outpt next week     DVT ppx: Xarelto   Code status: Full code (Pt agrees to chest compressions and intubation if required).  Emergency contact: Stacy Moncada ( [phone])     I spent a total of 80 minutes on the date of this encounter coordinating the patient's care. This includes reviewing prior documentation, results and imaging in addition to completing a full history and physical examination on the patient. Further tests, medications, and procedures have been ordered as indicated. Laboratory results and the plan of care were communicated to the patient and/or their family member. Supporting documentation was completed and added to the patient's chart.

## 2023-04-08 NOTE — ED PROVIDER NOTE - NS ED ROS FT
Constitutional: No fever or chills  Eyes: No visual changes  HEENT: No throat pain  CV: No chest pain  Resp: +SOB, +VAZQUEZ. No cough  GI: No abd pain, nausea or vomiting  : No dysuria  MSK: No musculoskeletal pain  Skin: No rash  Neuro: No headache Constitutional: No fever or chills  Eyes: No visual changes  HEENT: No throat pain  CV: No chest pain  Resp: +SOB, +VAZQUEZ. +orthopnea No cough  GI: No abd pain, nausea or vomiting  : No dysuria  MSK: No musculoskeletal pain  Skin: No rash  Neuro: No headache

## 2023-04-08 NOTE — ED PROVIDER NOTE - PHYSICAL EXAMINATION
Constitutional: NAD AAOx3  Eyes: PERRLA EOMI  Head: Normocephalic atraumatic  Mouth: MMM  Cardiac: regular rate   Resp: Diminished breath sounds b/l   MSK: Mild b/l LE edema  GI: Abd s/nt/nd  Neuro: CN2-12 intact  Skin: No rashes Constitutional: mild distress AAOx3  Eyes: PERRLA EOMI  Head: Normocephalic atraumatic  Mouth: MMM  Cardiac: regular rate   Resp: Diminished breath sounds b/l rales at bases   MSK: Mild b/l LE edema  GI: Abd s/nt/nd  Neuro: CN2-12 intact  Skin: No rashes

## 2023-04-08 NOTE — ED ADULT NURSE NOTE - FINAL NURSING ELECTRONIC SIGNATURE
Per  patient arrived in clinic today for a pvr.  Patient ambulated to restroom emptied bladder.  Bladder scan performed showed a residual of 17ml  Patient advised to continue to increase fluid intake if unable to urinate by 3pm call or come back to urology.  Patient advised if after hours go to Emergency room     08-Apr-2023 22:03

## 2023-04-08 NOTE — H&P ADULT - HISTORY OF PRESENT ILLNESS
71 y/o F with PMH atrial fibrillation, small-medium pericardial effusion, myelofibrosis, recent pneumonia, renal stone, CHFpEF, mild COPD, on supplemental O2 (1L during the day and 2L at night since hospitalization at [facility] for PNA) presented with shortness of breath for the last 2 days. She also reports lower extremity swelling that started a while ago and occassional wheezing and palpitations today. No weight gain (pt was 121 upon d/c and was 119 today). She took 4 baby aspirin before coming to the ER today. Denies fevers, chills, chest pain, abdominal pain, N/V, diarrhea/constipation. Pt was previously on Jakafi which she was not responding to and was switched to Inrebic which was stopped d/t multiple side effects. She is due to start a new agent for myelofibrosis next week.     Prior admission:   - 3/29/23: mild acute on chronic CHFpEF     ER course: , SpO2 96% on 3L nasal cannula. Labs: WBC 37.78, Hb 7.1 (baseline ~8), , metamyelocytes 6%, myelocytes 5%, INR 1.37, glucose 144, albumin 3.2, alkaline phosphatase 173, BNP 4436. VBG: pH 7.41, CO2 50, HCO3 23. COVID and RVP negative.  EKG: Sinus tachycardia with  bpm, normal intervals, no ST segment changes, no T wave inversions (personally reviewed). CXR: cardiomegaly, increased vascular congestion b/l, right pleural effusion (personally reviewed).     Pt was given 80 mg IVP lasix. She is being placed on telemetry observation for further management.

## 2023-04-08 NOTE — ED PROVIDER NOTE - NS_ ATTENDINGSCRIBEDETAILS _ED_A_ED_FT
I, Johnnie Segura MD,  performed the initial face to face bedside interview with this patient regarding history of present illness, review of symptoms and relevant past medical, social and family history.  I completed an independent physical examination.  I was the initial provider who evaluated this patient.   I personally saw the patient and performed a substantive portion of the visit including all aspects of the medical decision making.  The history, relevant review of systems, past medical and surgical history, medical decision making, and physical examination was documented by the scribe in my presence and I attest to the accuracy of the documentation.

## 2023-04-08 NOTE — H&P ADULT - NSICDXPASTMEDICALHX_GEN_ALL_CORE_FT
PAST MEDICAL HISTORY:  Anemia     Atrial fibrillation     COPD, mild     H/O CHF     Kidney stone     Myelofibrosis     On home oxygen therapy     Pericardial effusion     PNA (pneumonia)

## 2023-04-08 NOTE — ED CLERICAL - NS ED CLERK NOTE PRE-ARRIVAL INFORMATION; ADDITIONAL PRE-ARRIVAL INFORMATION
This patient is enrolled in the readmission program and has active care navigation. This patient can be followed up by the care navigation team within 24 hours. To arrange close follow-up or to obtain additional clinical information about this patient, please call the contact number above.   Please call the hospitalist as needed to collaborate on further medical management ([phone])

## 2023-04-08 NOTE — ED ADULT TRIAGE NOTE - CHIEF COMPLAINT QUOTE
pt BIBEMS c/o shortness of breath. PMH COPD on 1L NC at home. pt endorsing increased shortness of breath on exertion. pt o2 83 on room air in triage. pursed lip breathing in triage. coloring appropriate. pt recently discharged from  for pneumonia

## 2023-04-08 NOTE — ED PROVIDER NOTE - CLINICAL SUMMARY MEDICAL DECISION MAKING FREE TEXT BOX
71 yo female presents to ED for SOB, worse laying flat over the past couple of days. Exam with diminished breath sounds b/l workup for COPD, CHF. Doubt PE, as pt is anticoagulated, sx not consistent, no signs of PNA or pneumothorax, labs, x-ray reassess. 71 yo female presents to ED for SOB, worse laying flat over the past couple of days. Exam with diminished breath sounds b/l workup for COPD, CHF. Doubt PE, as pt is anticoagulated, sx not consistent, no signs of PNA or pneumothorax, labs, x-ray reassess.    All labs and imaging reviewed by myself. elevated white blood cell count consistent with previous admission patient states this is secondary to myelofibrosis.  Hemoglobin around baseline.  proBNP elevated 2 times above previous level x-ray with bilateral pleural effusions and cardiomegaly symptoms labs consistent with CHF.  Rectal temperature normal patient without cough or fever doubt pneumonia elevated WBC is secondary to the myelofibrosis and exam and history consistent with CHF will not give antibiotics at this time care discussed with hospitalist who agrees and accepts.

## 2023-04-08 NOTE — H&P ADULT - NSHPPHYSICALEXAM_GEN_ALL_CORE
ICU Vital Signs Last 24 Hrs  T(C): 37.7 (08 Apr 2023 16:16), Max: 37.7 (08 Apr 2023 16:16)  T(F): 99.9 (08 Apr 2023 16:16), Max: 99.9 (08 Apr 2023 16:16)  HR: 104 (08 Apr 2023 19:38) (104 - 110)  BP: 100/69 (08 Apr 2023 19:38) (100/69 - 129/67)  RR: 22 (08 Apr 2023 19:38) (18 - 22)  SpO2: 96% (08 Apr 2023 19:38) (86% - 96%)    O2 Parameters below as of 08 Apr 2023 19:38  Patient On (Oxygen Delivery Method): nasal cannula  O2 Flow (L/min): 3    General: Awake and alert, cooperative with exam. No acute distress.   Skin: Warm, dry, and pink.   Eyes: Pupils equal and reactive to light. Extraocular eye movements intact. No conjunctival injection, discharge, or scleral icterus.   HEENT: Atraumatic, normocephalic. Moist mucus membranes.  Cardiology: Normal S1, S2. No murmurs, rubs, or gallops. Irregularly irregular.   Respiratory: Crackles at the bases b/l. No wheezes, rales, or rhonchi. Normal chest expansion.   Gastrointestinal: Positive bowel sounds. Soft, non-tender, non-distended. No guarding, rigidity, or rebound tenderness. No hepatosplenomegaly.   Musculoskeletal: 5/5 motor strength in all extremities. Normal range of motion.   Extremities: 1+ pitting edema bilaterally. Dorsalis pedis pulses 2+ bilaterally.   Neurological: A+Ox3 (person, place, and time). Cranial nerves 2-12 intact. Normal speech. No facial droop. No focal neurological deficits.  Psychiatric: Normal affect. Normal mood.

## 2023-04-08 NOTE — ED PROVIDER NOTE - OBJECTIVE STATEMENT
73 y/o female with a PMHx of anemia, PNA, myelofibrosis, CHF on Lasix 40mg daily, Afib on blood thinners, COPD, on 1-2L NC at home presents to the ED BIBEMS c/o shortness of breath starting two days ago. Pt endorsing increased shortness of breath on exertion, difficulty sleeping, difficulty sitting up. Pt also endorses leg swelling b/l. Pt recently discharged from  for pneumonia. Pt denies fevers, cough, CP, recent injuries, recent travel.  Pt denies smoking, EtOH use. Pt states her symptoms have improved at this time since onset. 71 y/o female with a PMHx of anemia, PNA, myelofibrosis, CHF on Lasix 40mg daily, Afib on blood thinners, COPD, on 1-2L NC at home presents to the ED BIBEMS c/o shortness of breath starting two days ago. Pt endorsing increased shortness of breath on exertion, difficulty sleeping, orthopnea. Pt also endorses leg swelling b/l. Pt recently discharged from  for pneumonia. Pt denies fevers, cough, CP, recent injuries, recent travel.  Pt denies smoking, EtOH use. Pt states her symptoms have improved at this time since onset.

## 2023-04-08 NOTE — H&P ADULT - NSHPSOCIALHISTORY_GEN_ALL_CORE
Lives with her son and . Independent with ADLs and IADLs. Smoked 1 ppd for 40 years and quit 20 years ago. Denies alcohol or drug use.

## 2023-04-08 NOTE — ED PROVIDER NOTE - CONTACT TIME
Spoke several times with  Marilia today. She did try and clamp diandra G tube overnight but awake at 1am and had to unclamp it. She drained 1500 mls between 1 and and 8am. I instructed her to only clamp for 4 hours at a time today. I spoke to her at 3pm and she reports an output of 600 after she unclamped after 4 hours and another 400cc over the next 2 hours.She will give herself 2 litres of fluid today.   08-Apr-2023 16:26

## 2023-04-09 DIAGNOSIS — I48.91 UNSPECIFIED ATRIAL FIBRILLATION: ICD-10-CM

## 2023-04-09 DIAGNOSIS — I50.33 ACUTE ON CHRONIC DIASTOLIC (CONGESTIVE) HEART FAILURE: ICD-10-CM

## 2023-04-09 LAB
A1C WITH ESTIMATED AVERAGE GLUCOSE RESULT: 5.1 % — SIGNIFICANT CHANGE UP (ref 4–5.6)
ALBUMIN SERPL ELPH-MCNC: 3.1 G/DL — LOW (ref 3.3–5)
ALP SERPL-CCNC: 160 U/L — HIGH (ref 40–120)
ALT FLD-CCNC: 16 U/L — SIGNIFICANT CHANGE UP (ref 12–78)
ANION GAP SERPL CALC-SCNC: 1 MMOL/L — LOW (ref 5–17)
ANISOCYTOSIS BLD QL: SIGNIFICANT CHANGE UP
AST SERPL-CCNC: 17 U/L — SIGNIFICANT CHANGE UP (ref 15–37)
BASOPHILS # BLD AUTO: 0 K/UL — SIGNIFICANT CHANGE UP (ref 0–0.2)
BASOPHILS NFR BLD AUTO: 0 % — SIGNIFICANT CHANGE UP (ref 0–2)
BILIRUB SERPL-MCNC: 0.9 MG/DL — SIGNIFICANT CHANGE UP (ref 0.2–1.2)
BUN SERPL-MCNC: 31 MG/DL — HIGH (ref 7–23)
CALCIUM SERPL-MCNC: 8.5 MG/DL — SIGNIFICANT CHANGE UP (ref 8.5–10.1)
CHLORIDE SERPL-SCNC: 100 MMOL/L — SIGNIFICANT CHANGE UP (ref 96–108)
CO2 SERPL-SCNC: 37 MMOL/L — HIGH (ref 22–31)
CREAT SERPL-MCNC: 0.67 MG/DL — SIGNIFICANT CHANGE UP (ref 0.5–1.3)
EGFR: 93 ML/MIN/1.73M2 — SIGNIFICANT CHANGE UP
ELLIPTOCYTES BLD QL SMEAR: SLIGHT — SIGNIFICANT CHANGE UP
EOSINOPHIL # BLD AUTO: 1.17 K/UL — HIGH (ref 0–0.5)
EOSINOPHIL NFR BLD AUTO: 3 % — SIGNIFICANT CHANGE UP (ref 0–6)
ESTIMATED AVERAGE GLUCOSE: 100 MG/DL — SIGNIFICANT CHANGE UP (ref 68–114)
GLUCOSE SERPL-MCNC: 117 MG/DL — HIGH (ref 70–99)
HCT VFR BLD CALC: 23.6 % — LOW (ref 34.5–45)
HGB BLD-MCNC: 7 G/DL — CRITICAL LOW (ref 11.5–15.5)
LYMPHOCYTES # BLD AUTO: 10 % — LOW (ref 13–44)
LYMPHOCYTES # BLD AUTO: 3.9 K/UL — HIGH (ref 1–3.3)
MACROCYTES BLD QL: SLIGHT — SIGNIFICANT CHANGE UP
MANUAL SMEAR VERIFICATION: SIGNIFICANT CHANGE UP
MCHC RBC-ENTMCNC: 28.1 PG — SIGNIFICANT CHANGE UP (ref 27–34)
MCHC RBC-ENTMCNC: 29.7 GM/DL — LOW (ref 32–36)
MCV RBC AUTO: 94.8 FL — SIGNIFICANT CHANGE UP (ref 80–100)
METAMYELOCYTES # FLD: 4 % — HIGH (ref 0–0)
MONOCYTES # BLD AUTO: 0.78 K/UL — SIGNIFICANT CHANGE UP (ref 0–0.9)
MONOCYTES NFR BLD AUTO: 2 % — SIGNIFICANT CHANGE UP (ref 2–14)
MYELOCYTES NFR BLD: 3 % — HIGH (ref 0–0)
NEUTROPHILS # BLD AUTO: 30.45 K/UL — HIGH (ref 1.8–7.4)
NEUTROPHILS NFR BLD AUTO: 65 % — SIGNIFICANT CHANGE UP (ref 43–77)
NEUTS BAND # BLD: 13 % — HIGH (ref 0–8)
NRBC # BLD: 0 /100 — SIGNIFICANT CHANGE UP (ref 0–0)
NRBC # BLD: SIGNIFICANT CHANGE UP /100 WBCS (ref 0–0)
OB PNL STL: NEGATIVE — SIGNIFICANT CHANGE UP
OVALOCYTES BLD QL SMEAR: SLIGHT — SIGNIFICANT CHANGE UP
PLAT MORPH BLD: NORMAL — SIGNIFICANT CHANGE UP
PLATELET # BLD AUTO: 705 K/UL — HIGH (ref 150–400)
PLATELET COUNT - ESTIMATE: ABNORMAL
POIKILOCYTOSIS BLD QL AUTO: SLIGHT — SIGNIFICANT CHANGE UP
POLYCHROMASIA BLD QL SMEAR: SLIGHT — SIGNIFICANT CHANGE UP
POTASSIUM SERPL-MCNC: 4.3 MMOL/L — SIGNIFICANT CHANGE UP (ref 3.5–5.3)
POTASSIUM SERPL-SCNC: 4.3 MMOL/L — SIGNIFICANT CHANGE UP (ref 3.5–5.3)
PROCALCITONIN SERPL-MCNC: 0.63 NG/ML — HIGH (ref 0.02–0.1)
PROT SERPL-MCNC: 7.1 GM/DL — SIGNIFICANT CHANGE UP (ref 6–8.3)
RBC # BLD: 2.49 M/UL — LOW (ref 3.8–5.2)
RBC # FLD: 25.8 % — HIGH (ref 10.3–14.5)
RBC BLD AUTO: ABNORMAL
SODIUM SERPL-SCNC: 138 MMOL/L — SIGNIFICANT CHANGE UP (ref 135–145)
WBC # BLD: 39.04 K/UL — HIGH (ref 3.8–10.5)
WBC # FLD AUTO: 39.04 K/UL — HIGH (ref 3.8–10.5)

## 2023-04-09 RX ADMIN — BUDESONIDE AND FORMOTEROL FUMARATE DIHYDRATE 2 PUFF(S): 160; 4.5 AEROSOL RESPIRATORY (INHALATION) at 20:01

## 2023-04-09 RX ADMIN — Medication 1000 UNIT(S): at 10:08

## 2023-04-09 RX ADMIN — RIVAROXABAN 15 MILLIGRAM(S): KIT at 17:52

## 2023-04-09 RX ADMIN — Medication 50 MILLIGRAM(S): at 10:08

## 2023-04-09 RX ADMIN — ATORVASTATIN CALCIUM 10 MILLIGRAM(S): 80 TABLET, FILM COATED ORAL at 21:54

## 2023-04-09 RX ADMIN — Medication 3 MILLIGRAM(S): at 21:54

## 2023-04-09 RX ADMIN — PANTOPRAZOLE SODIUM 40 MILLIGRAM(S): 20 TABLET, DELAYED RELEASE ORAL at 05:30

## 2023-04-09 RX ADMIN — Medication 81 MILLIGRAM(S): at 10:07

## 2023-04-09 RX ADMIN — Medication 125 MICROGRAM(S): at 10:08

## 2023-04-09 RX ADMIN — TIOTROPIUM BROMIDE 2 PUFF(S): 18 CAPSULE ORAL; RESPIRATORY (INHALATION) at 09:29

## 2023-04-09 RX ADMIN — BUDESONIDE AND FORMOTEROL FUMARATE DIHYDRATE 2 PUFF(S): 160; 4.5 AEROSOL RESPIRATORY (INHALATION) at 09:29

## 2023-04-09 RX ADMIN — Medication 40 MILLIGRAM(S): at 10:07

## 2023-04-09 NOTE — CONSULT NOTE ADULT - PROBLEM SELECTOR RECOMMENDATION 9
pt. recently discharged end of March, now with SOB, seems fluid overloaded, Echo in 3/23 - preserved LVEF 60%, moderate TR, pleural effusion, elevated [id number]  Recommendation: start diuresis with lasix 40 mg ivp daily cont. BB, daily weight, strict i&0s, fluid restriction 1.5L/day

## 2023-04-09 NOTE — PATIENT PROFILE ADULT - NSPROGENPREVTRANSFREQ
Doesn't remember States she went home post transfusion and then had to be re-admitted due to overload

## 2023-04-09 NOTE — PROGRESS NOTE ADULT - SUBJECTIVE AND OBJECTIVE BOX
4/9: feeling better  H: 7.1 stable     Home Medications:  Albuterol (Eqv-ProAir HFA) 90 mcg/inh inhalation aerosol: 2 puff(s) inhaled every 6 hours as needed for  shortness of breath and/or wheezing (08 Apr 2023 21:18)  Anoro Ellipta 62.5 mcg-25 mcg/inh inhalation powder: 1 puff(s) inhaled once a day (08 Apr 2023 18:34)  Aspir 81 oral delayed release tablet: 1 tab(s) orally once a day (08 Apr 2023 18:33)  atorvastatin 10 mg oral tablet: 1 tab(s) orally once a day (at bedtime) (08 Apr 2023 21:53)  cholecalciferol 25 mcg (1000 intl units) oral tablet: 1 tab(s) orally once a day (08 Apr 2023 18:36)  digoxin 125 mcg (0.125 mg) oral tablet: 1 tab(s) orally once a day (08 Apr 2023 18:34)  Metoprolol Succinate ER 50 mg oral tablet, extended release: 1 tab(s) orally once a day (08 Apr 2023 18:33)  omeprazole 20 mg oral delayed release capsule: 1 tab(s) orally once a day (08 Apr 2023 21:30)  rivaroxaban 15 mg oral tablet: 1 tab(s) orally once a day (in the evening) (08 Apr 2023 21:17)  tamsulosin 0.4 mg oral capsule: 1 tab(s) orally once a day (at bedtime) (08 Apr 2023 21:18)    MEDICATIONS  (STANDING):  aspirin enteric coated 81 milliGRAM(s) Oral daily  atorvastatin 10 milliGRAM(s) Oral at bedtime  budesonide 160 MICROgram(s)/formoterol 4.5 MICROgram(s) Inhaler 2 Puff(s) Inhalation two times a day  buMETAnide Injectable 1 milliGRAM(s) IV Push two times a day  cholecalciferol 1000 Unit(s) Oral daily  digoxin     Tablet 125 MICROGram(s) Oral daily  metoprolol succinate ER 50 milliGRAM(s) Oral daily  pantoprazole    Tablet 40 milliGRAM(s) Oral before breakfast  rivaroxaban 15 milliGRAM(s) Oral with dinner  tamsulosin 0.4 milliGRAM(s) Oral at bedtime  tiotropium 2.5 MICROgram(s) Inhaler 2 Puff(s) Inhalation daily    Vital Signs Last 24 Hrs  T(C): 36.4 (10 Apr 2023 08:22), Max: 36.8 (09 Apr 2023 17:10)  T(F): 97.5 (10 Apr 2023 08:22), Max: 98.2 (09 Apr 2023 17:10)  HR: 112 (10 Apr 2023 09:15) (92 - 114)  BP: 110/63 (10 Apr 2023 08:22) (94/54 - 114/75)  BP(mean): --  RR: 18 (10 Apr 2023 08:22) (18 - 18)  SpO2: 92% (10 Apr 2023 09:15) (92% - 96%)    Parameters below as of 10 Apr 2023 09:15  Patient On (Oxygen Delivery Method): nasal cannula,3 lpm    General: Awake and alert, cooperative with exam. No acute distress.   Skin: Warm, dry, and pink.   Eyes: Pupils equal and reactive to light. Extraocular eye movements intact. No conjunctival injection, discharge, or scleral icterus.   HEENT: Atraumatic, normocephalic. Moist mucus membranes.  Cardiology: Normal S1, S2. No murmurs, rubs, or gallops. Irregularly irregular.   Respiratory: Crackles at the bases b/l. No wheezes, rales, or rhonchi. Normal chest expansion.   Gastrointestinal: Positive bowel sounds. Soft, non-tender, non-distended. No guarding, rigidity, or rebound tenderness. No hepatosplenomegaly.   Musculoskeletal: 5/5 motor strength in all extremities. Normal range of motion.   Extremities: 1+ pitting edema bilaterally. Dorsalis pedis pulses 2+ bilaterally.   Neurological: A+Ox3 (person, place, and time). Cranial nerves 2-12 intact. Normal speech. No facial droop. No focal neurological deficits.  Psychiatric: Normal affect. Normal mood                               7.0    39.04 )-----------( 705      ( 09 Apr 2023 07:23 )             23.6     04-09    138  |  100  |  31<H>  ----------------------------<  117<H>  4.3   |  37<H>  |  0.67    Ca    8.5      09 Apr 2023 07:23  Mg     1.4     04-08    TPro  7.1  /  Alb  3.1<L>  /  TBili  0.9  /  DBili  x   /  AST  17  /  ALT  16  /  AlkPhos  160<H>  04-09    - TroponinI hsT: <-20.70    Radiology:    < from: Xray Chest 1 View- PORTABLE-Urgent (04.08.23 @ 17:07) >    ACC: [id number] EXAM:  XR CHEST PORTABLE URGENT 1V   ORDERED BY: JUDY MEDINA     PROCEDURE DATE:  04/08/2023          INTERPRETATION:  CLINICAL STATEMENT: Chest Pain.    TECHNIQUE: AP view of the chest.      COMPARISON: 3/23/2023      Cardiomegaly.Pulmonary vascular congestion. Increased right base   effusion.    No significant osseous abnormality.    IMPRESSION:    Cardiomegaly. Pulmonary vascular congestion. Increased right base   effusion.    --- End of Report ---            STEVE VAUGHN MD; Attending Radiologist  This document has been electronically signed. Apr 9 2023 11:46AM    < end of copied text >

## 2023-04-09 NOTE — PROGRESS NOTE ADULT - SUBJECTIVE AND OBJECTIVE BOX
4/9 : feeling better;   Hb 7.0, stable      PHYSICAL EXAM:    Daily Height in cm: 152.4 (2023 16:16)    Daily Weight in k.3 (2023 06:19)    Vital Signs Last 24 Hrs  T(C): 36.7 (2023 08:26), Max: 37.7 (2023 16:16)  T(F): 98.1 (2023 08:26), Max: 99.9 (2023 16:16)  HR: 117 (2023 08:26) (103 - 117)  BP: 119/69 (2023 08:26) (100/69 - 129/67)  BP(mean): 89 (2023 21:40) (89 - 89)  RR: 18 (2023 08:26) (18 - 22)  SpO2: 93% (2023 08:26) (86% - 97%)    Constitutional: Weak appearing  HEENT: Atraumatic, TANISHA,   Respiratory: Breath Sounds normal, no rhonchi/wheeze  Cardiovascular: N S1S2;   Gastrointestinal: Abdomen soft, non tender, Bowel Sounds present  Extremities: trace edema, peripheral pulses present  Neurological: AAO x 3, no gross focal motor deficits  Skin: Non cellulitic, no rash, ulcers  Lymph Nodes: No lymphadenopathy noted  Back: No CVA tenderness   Musculoskeletal: non tender  Breasts: Deferred  Genitourinary: deferred  Rectal: Deferred    All Labs/EKG/Radiology/Meds reviewed by me                          7.0    39.04 )-----------( 705      ( 2023 07:23 )             23.6       CBC Full  -  ( 2023 07:23 )  WBC Count : 39.04 K/uL  RBC Count : 2.49 M/uL  Hemoglobin : 7.0 g/dL  Hematocrit : 23.6 %  Platelet Count - Automated : 705 K/uL  Mean Cell Volume : 94.8 fl  Mean Cell Hemoglobin : 28.1 pg  Mean Cell Hemoglobin Concentration : 29.7 gm/dL  Auto Neutrophil # : 30.45 K/uL  Auto Lymphocyte # : 3.90 K/uL  Auto Monocyte # : 0.78 K/uL  Auto Eosinophil # : 1.17 K/uL  Auto Basophil # : 0.00 K/uL  Auto Neutrophil % : 65.0 %  Auto Lymphocyte % : 10.0 %  Auto Monocyte % : 2.0 %  Auto Eosinophil % : 3.0 %  Auto Basophil % : 0.0 %          138  |  100  |  31<H>  ----------------------------<  117<H>  4.3   |  37<H>  |  0.67    Ca    8.5      2023 07:23  Mg     1.4         TPro  7.1  /  Alb  3.1<L>  /  TBili  0.9  /  DBili  x   /  AST  17  /  ALT  16  /  AlkPhos  160<H>        LIVER FUNCTIONS - ( 2023 07:23 )  Alb: 3.1 g/dL / Pro: 7.1 gm/dL / ALK PHOS: 160 U/L / ALT: 16 U/L / AST: 17 U/L / GGT: x             PT/INR - ( 2023 16:39 )   PT: 15.9 sec;   INR: 1.37 ratio         PTT - ( 2023 16:39 )  PTT:36.8 sec                  MEDICATIONS  (STANDING):  aspirin enteric coated 81 milliGRAM(s) Oral daily  atorvastatin 10 milliGRAM(s) Oral at bedtime  budesonide 160 MICROgram(s)/formoterol 4.5 MICROgram(s) Inhaler 2 Puff(s) Inhalation two times a day  cholecalciferol 1000 Unit(s) Oral daily  digoxin     Tablet 125 MICROGram(s) Oral daily  furosemide   Injectable 40 milliGRAM(s) IV Push daily  metoprolol succinate ER 50 milliGRAM(s) Oral daily  pantoprazole    Tablet 40 milliGRAM(s) Oral before breakfast  rivaroxaban 15 milliGRAM(s) Oral with dinner  tamsulosin 0.4 milliGRAM(s) Oral at bedtime  tiotropium 2.5 MICROgram(s) Inhaler 2 Puff(s) Inhalation daily    MEDICATIONS  (PRN):  acetaminophen     Tablet .. 650 milliGRAM(s) Oral every 6 hours PRN Temp greater or equal to 38C (100.4F), Mild Pain (1 - 3)  albuterol    90 MICROgram(s) HFA Inhaler 2 Puff(s) Inhalation every 6 hours PRN for shortness of breath and/or wheezing  aluminum hydroxide/magnesium hydroxide/simethicone Suspension 30 milliLiter(s) Oral every 4 hours PRN Dyspepsia  melatonin 3 milliGRAM(s) Oral at bedtime PRN Insomnia  ondansetron Injectable 4 milliGRAM(s) IV Push every 8 hours PRN Nausea and/or Vomiting    71 y/o F presented with shortness of breath     1. Acute on chronic hypoxic/hypercapneic respiratory failure secondary to acute CHF on CHFpEF   - Telemetry observation   - c/w supplemental O2 to keep SpO2 88-92%   - SpO2 96% on 3L nasal cannula (pt on 1L during the day and 2L at night at home)   - No expiratory wheezing to suggest COPD exacerbation; however, pt with CO2 retention on VBG, c/w inhalers   - ECHO (3/15/23): LVEF 60%, mild MR, small pericardial effusion, pleural effusion present, mild to moderate pulmonic regurgitation, moderate 2+ TR   - BNP 4436, pt is fluid overloaded on exam   - Pt refusing CT chest   - s/p 80 mg IVP lasix in the ER   - c/w 40 mg IVP lasix daily   - c/w home medications: beta blockers    - Strict I+Os, daily weights   - 2L H2O restriction, 2g sodium restriction      - Keep K > 4 and Mg > 2    - Cardiology consult - Dr. Leon     2. Normocytic anemia likely anemia of chronic disease in the setting of Myelofibrosis :  - Hb 7.1 (baseline ~8), monitor closely   - No hx of bloody stools or tarry stools   - Ordered fecal occult, type and screen    - Blood consent in chart if H+H drops   - Transfuse for Hb < 7     3. Hyperglycemia   - Glucose 144, ordered HbA1c     4. Hypoalbuminemia   - Albumin 3.2   - Nutrition consult     5. History of atrial fibrillation, small-medium pericardial effusion, myelofibrosis, recent pneumonia, renal stone, CHFpEF, mild COPD, on supplemental O2 (1L during the day and 2L at night since hospitalization at [facility]/[place] for PNA)  - c/w home medications; verified with pt at the bedside   - , will give 1 dose of 2.5 mg lopressor -> monitor on telemetry   - WBC 37.78, , metamyelocytes 6%, myelocytes 5%, alkaline phosphatase 173 -> secondary to meylofibrosis, trend -> pt to start new treatment outpt next week     DVT ppx: Xarelto   Code status: Full code (Pt agrees to chest compressions and intubation if required).  Emergency contact: Stacy Moncada ( [phone])    4/9 : feeling better;   Hb 7.0, stable      PHYSICAL EXAM:    Daily Height in cm: 152.4 (2023 16:16)    Daily Weight in k.3 (2023 06:19)    Vital Signs Last 24 Hrs  T(C): 36.7 (2023 08:26), Max: 37.7 (2023 16:16)  T(F): 98.1 (2023 08:26), Max: 99.9 (2023 16:16)  HR: 117 (2023 08:26) (103 - 117)  BP: 119/69 (2023 08:26) (100/69 - 129/67)  BP(mean): 89 (2023 21:40) (89 - 89)  RR: 18 (2023 08:26) (18 - 22)  SpO2: 93% (2023 08:26) (86% - 97%)    Constitutional: Weak appearing  HEENT: Atraumatic, TANISHA,   Respiratory: Breath Sounds normal, no rhonchi/wheeze  Cardiovascular: N S1S2;   Gastrointestinal: Abdomen soft, non tender, Bowel Sounds present  Extremities: trace edema, peripheral pulses present  Neurological: AAO x 3, no gross focal motor deficits  Skin: Non cellulitic, no rash, ulcers  Lymph Nodes: No lymphadenopathy noted  Back: No CVA tenderness   Musculoskeletal: non tender  Breasts: Deferred  Genitourinary: deferred  Rectal: Deferred    All Labs/EKG/Radiology/Meds reviewed by me                          7.0    39.04 )-----------( 705      ( 2023 07:23 )             23.6       CBC Full  -  ( 2023 07:23 )  WBC Count : 39.04 K/uL  RBC Count : 2.49 M/uL  Hemoglobin : 7.0 g/dL  Hematocrit : 23.6 %  Platelet Count - Automated : 705 K/uL  Mean Cell Volume : 94.8 fl  Mean Cell Hemoglobin : 28.1 pg  Mean Cell Hemoglobin Concentration : 29.7 gm/dL  Auto Neutrophil # : 30.45 K/uL  Auto Lymphocyte # : 3.90 K/uL  Auto Monocyte # : 0.78 K/uL  Auto Eosinophil # : 1.17 K/uL  Auto Basophil # : 0.00 K/uL  Auto Neutrophil % : 65.0 %  Auto Lymphocyte % : 10.0 %  Auto Monocyte % : 2.0 %  Auto Eosinophil % : 3.0 %  Auto Basophil % : 0.0 %          138  |  100  |  31<H>  ----------------------------<  117<H>  4.3   |  37<H>  |  0.67    Ca    8.5      2023 07:23  Mg     1.4         TPro  7.1  /  Alb  3.1<L>  /  TBili  0.9  /  DBili  x   /  AST  17  /  ALT  16  /  AlkPhos  160<H>        LIVER FUNCTIONS - ( 2023 07:23 )  Alb: 3.1 g/dL / Pro: 7.1 gm/dL / ALK PHOS: 160 U/L / ALT: 16 U/L / AST: 17 U/L / GGT: x             PT/INR - ( 2023 16:39 )   PT: 15.9 sec;   INR: 1.37 ratio         PTT - ( 2023 16:39 )  PTT:36.8 sec                  MEDICATIONS  (STANDING):  aspirin enteric coated 81 milliGRAM(s) Oral daily  atorvastatin 10 milliGRAM(s) Oral at bedtime  budesonide 160 MICROgram(s)/formoterol 4.5 MICROgram(s) Inhaler 2 Puff(s) Inhalation two times a day  cholecalciferol 1000 Unit(s) Oral daily  digoxin     Tablet 125 MICROGram(s) Oral daily  furosemide   Injectable 40 milliGRAM(s) IV Push daily  metoprolol succinate ER 50 milliGRAM(s) Oral daily  pantoprazole    Tablet 40 milliGRAM(s) Oral before breakfast  rivaroxaban 15 milliGRAM(s) Oral with dinner  tamsulosin 0.4 milliGRAM(s) Oral at bedtime  tiotropium 2.5 MICROgram(s) Inhaler 2 Puff(s) Inhalation daily    MEDICATIONS  (PRN):  acetaminophen     Tablet .. 650 milliGRAM(s) Oral every 6 hours PRN Temp greater or equal to 38C (100.4F), Mild Pain (1 - 3)  albuterol    90 MICROgram(s) HFA Inhaler 2 Puff(s) Inhalation every 6 hours PRN for shortness of breath and/or wheezing  aluminum hydroxide/magnesium hydroxide/simethicone Suspension 30 milliLiter(s) Oral every 4 hours PRN Dyspepsia  melatonin 3 milliGRAM(s) Oral at bedtime PRN Insomnia  ondansetron Injectable 4 milliGRAM(s) IV Push every 8 hours PRN Nausea and/or Vomiting    73 y/o F presented with shortness of breath     1. Acute on chronic hypoxic/hypercapneic respiratory failure secondary to acute CHF on CHFpEF : On Home O2 1-2 L/min nc  - Telemetry observation   - c/w supplemental O2 to keep SpO2 88-92%   - SpO2 96% on 3L nasal cannula (pt on 1L during the day and 2L at night at home)   - No expiratory wheezing to suggest COPD exacerbation; however, pt with CO2 retention on VBG, c/w inhalers   - ECHO (3/15/23): LVEF 60%, mild MR, small pericardial effusion, pleural effusion present, mild to moderate pulmonic regurgitation, moderate 2+ TR   - BNP 4436, pt is fluid overloaded on exam   - Pt refusing CT chest   - s/p 80 mg IVP lasix in the ER   - c/w 40 mg IVP lasix daily   - c/w home medications: beta blockers    - Strict I+Os, daily weights   - 2L H2O restriction, 2g sodium restriction      - Keep K > 4 and Mg > 2    - Cardiology consult - Dr. Leon     2. Normocytic anemia likely anemia of chronic disease in the setting of Myelofibrosis :  - Hb 7.1 (baseline ~8), monitor closely   - No hx of bloody stools or tarry stools   - Ordered fecal occult, type and screen    - Blood consent in chart if H+H drops   - Transfuse for Hb < 7     3. Hyperglycemia   - Glucose 144, ordered HbA1c     4. Hypoalbuminemia   - Albumin 3.2   - Nutrition consult     5. History of atrial fibrillation, small-medium pericardial effusion, myelofibrosis, recent pneumonia, renal stone, CHFpEF, mild COPD, on supplemental O2 (1L during the day and 2L at night since hospitalization at [facility] for PNA)  - c/w home medications; verified with pt at the bedside   - , will give 1 dose of 2.5 mg lopressor -> monitor on telemetry   - WBC 37.78, , metamyelocytes 6%, myelocytes 5%, alkaline phosphatase 173 -> secondary to meylofibrosis, trend -> pt to start new treatment outpt next week     DVT ppx: Xarelto   Code status: Full code (Pt agrees to chest compressions and intubation if required).  Emergency contact: Stacy Moncada ( [phone])

## 2023-04-09 NOTE — PHYSICAL THERAPY INITIAL EVALUATION ADULT - PERTINENT HX OF CURRENT PROBLEM, REHAB EVAL
73 y/o F with PMH atrial fibrillation, small-medium pericardial effusion, myelofibrosis, recent pneumonia, renal stone, CHFpEF, mild COPD, on supplemental O2 (1L during the day and 2L at night since hospitalization at [facility]/[place] for PNA) presented with shortness of breath for the last 2 days. She also reports lower extremity swelling that started a while ago and occassional wheezing and palpitations today. No weight gain (pt was 121 upon d/c and was 119 today). She took 4 baby aspirin before coming to the ER today. Denies fevers, chills, chest pain, abdominal pain, N/V, diarrhea/constipation. Pt was previously on Jakafi which she was not responding to and was switched to Inrebic which was stopped d/t multiple side effects. She is due to start a new agent for myelofibrosis next week. 71 y/o F with PMH atrial fibrillation, small-medium pericardial effusion, myelofibrosis, recent pneumonia, renal stone, CHFpEF, mild COPD, on supplemental O2 (1L during the day and 2L at night since hospitalization at [facility]/[place] for PNA) presented with shortness of breath for the last 2 days. She also reports lower extremity swelling that started a while ago and occasional wheezing and palpitations today. No weight gain (pt was 121 upon d/c and was 119 today). She took 4 baby aspirin before coming to the ER today. Denies fevers, chills, chest pain, abdominal pain, N/V, diarrhea/constipation. Pt was previously on Jakafi which she was not responding to and was switched to Inrebic which was stopped d/t multiple side effects. She is due to start a new agent for myelofibrosis next week.

## 2023-04-09 NOTE — PHYSICAL THERAPY INITIAL EVALUATION ADULT - GENERAL OBSERVATIONS, REHAB EVAL
Pt rec'd supine in bed with family at bedside, pleasant and cooperative with PT, no c/o pain, on 2L O2 via nc

## 2023-04-09 NOTE — PATIENT PROFILE ADULT - FOOD INSECURITY
Subjective:       Patient ID: Sarbjit Schmidt no

## 2023-04-10 LAB
APPEARANCE: ABNORMAL
BACTERIA UR CULT: NORMAL
BACTERIA: NEGATIVE
BILIRUBIN URINE: NEGATIVE
BLOOD URINE: ABNORMAL
COLOR: YELLOW
GLUCOSE QUALITATIVE U: NEGATIVE
HYALINE CASTS: 7 /LPF
KETONES URINE: NEGATIVE
LEUKOCYTE ESTERASE URINE: ABNORMAL
MICROSCOPIC-UA: NORMAL
NITRITE URINE: NEGATIVE
PH URINE: 6
PROTEIN URINE: ABNORMAL
RED BLOOD CELLS URINE: 83 /HPF
SPECIFIC GRAVITY URINE: 1.02
SQUAMOUS EPITHELIAL CELLS: 1 /HPF
URIC ACID CRYSTALS: ABNORMAL
UROBILINOGEN URINE: NORMAL
WHITE BLOOD CELLS URINE: 21 /HPF

## 2023-04-10 PROCEDURE — 85014 HEMATOCRIT: CPT

## 2023-04-10 PROCEDURE — 36430 TRANSFUSION BLD/BLD COMPNT: CPT

## 2023-04-10 PROCEDURE — 99233 SBSQ HOSP IP/OBS HIGH 50: CPT

## 2023-04-10 PROCEDURE — 94640 AIRWAY INHALATION TREATMENT: CPT

## 2023-04-10 PROCEDURE — 82150 ASSAY OF AMYLASE: CPT

## 2023-04-10 PROCEDURE — 83880 ASSAY OF NATRIURETIC PEPTIDE: CPT

## 2023-04-10 PROCEDURE — 86902 BLOOD TYPE ANTIGEN DONOR EA: CPT

## 2023-04-10 PROCEDURE — 86922 COMPATIBILITY TEST ANTIGLOB: CPT

## 2023-04-10 PROCEDURE — 86920 COMPATIBILITY TEST SPIN: CPT

## 2023-04-10 PROCEDURE — 36415 COLL VENOUS BLD VENIPUNCTURE: CPT

## 2023-04-10 PROCEDURE — 80162 ASSAY OF DIGOXIN TOTAL: CPT

## 2023-04-10 PROCEDURE — 85027 COMPLETE CBC AUTOMATED: CPT

## 2023-04-10 PROCEDURE — 82607 VITAMIN B-12: CPT

## 2023-04-10 PROCEDURE — 85018 HEMOGLOBIN: CPT

## 2023-04-10 PROCEDURE — P9016: CPT

## 2023-04-10 PROCEDURE — 83690 ASSAY OF LIPASE: CPT

## 2023-04-10 PROCEDURE — 86921 COMPATIBILITY TEST INCUBATE: CPT

## 2023-04-10 RX ORDER — BUMETANIDE 0.25 MG/ML
1 INJECTION INTRAMUSCULAR; INTRAVENOUS ONCE
Refills: 0 | Status: COMPLETED | OUTPATIENT
Start: 2023-04-10 | End: 2023-04-10

## 2023-04-10 RX ORDER — BUMETANIDE 0.25 MG/ML
1 INJECTION INTRAMUSCULAR; INTRAVENOUS
Refills: 0 | Status: DISCONTINUED | OUTPATIENT
Start: 2023-04-10 | End: 2023-04-13

## 2023-04-10 RX ADMIN — PANTOPRAZOLE SODIUM 40 MILLIGRAM(S): 20 TABLET, DELAYED RELEASE ORAL at 05:09

## 2023-04-10 RX ADMIN — BUDESONIDE AND FORMOTEROL FUMARATE DIHYDRATE 2 PUFF(S): 160; 4.5 AEROSOL RESPIRATORY (INHALATION) at 09:07

## 2023-04-10 RX ADMIN — Medication 50 MILLIGRAM(S): at 10:42

## 2023-04-10 RX ADMIN — Medication 125 MICROGRAM(S): at 10:42

## 2023-04-10 RX ADMIN — RIVAROXABAN 15 MILLIGRAM(S): KIT at 17:07

## 2023-04-10 RX ADMIN — BUDESONIDE AND FORMOTEROL FUMARATE DIHYDRATE 2 PUFF(S): 160; 4.5 AEROSOL RESPIRATORY (INHALATION) at 21:38

## 2023-04-10 RX ADMIN — Medication 81 MILLIGRAM(S): at 10:41

## 2023-04-10 RX ADMIN — BUMETANIDE 1 MILLIGRAM(S): 0.25 INJECTION INTRAMUSCULAR; INTRAVENOUS at 17:07

## 2023-04-10 RX ADMIN — ATORVASTATIN CALCIUM 10 MILLIGRAM(S): 80 TABLET, FILM COATED ORAL at 21:33

## 2023-04-10 RX ADMIN — TIOTROPIUM BROMIDE 2 PUFF(S): 18 CAPSULE ORAL; RESPIRATORY (INHALATION) at 09:07

## 2023-04-10 RX ADMIN — Medication 3 MILLIGRAM(S): at 21:33

## 2023-04-10 RX ADMIN — Medication 1000 UNIT(S): at 10:42

## 2023-04-10 RX ADMIN — BUMETANIDE 1 MILLIGRAM(S): 0.25 INJECTION INTRAMUSCULAR; INTRAVENOUS at 10:41

## 2023-04-10 NOTE — DIETITIAN NUTRITION RISK NOTIFICATION - ADDITIONAL COMMENTS/DIETITIAN RECOMMENDATIONS
1) Liberalize diet to regular to maximize caloric and nutrient intake, 1500mL FR as per MD  2) Add ensure + HP shake BID (350kcal, 20g protein)  3) Monitor bowel movements, if no BM for >3 days, consider implementing bowel regimen.  4) MVI w/ minerals daily to ensure 100% RDA met  5) Consider adding thiamine 100 mg daily 2/2 poor PO intake/ malnutrition  6) Encourage protein-rich foods, maximize food preferences  7) Meal encouragement; tray set-up to increase po intake  8) Confirm goals of care regarding nutrition support  RD will continue to monitor PO intake, labs, hydration, and wt prn.

## 2023-04-10 NOTE — PROGRESS NOTE ADULT - SUBJECTIVE AND OBJECTIVE BOX
CHIEF COMPLAINT: Patient is a 72y old  Female who presents with a chief complaint of     HPI:  73 y/o F with PMH atrial fibrillation, small-medium pericardial effusion, myelofibrosis, recent pneumonia, renal stone, CHFpEF, mild COPD, on supplemental O2 (1L during the day and 2L at night since hospitalization at [facility]/[place] for PNA) presented with shortness of breath for the last 2 days. She also reports lower extremity swelling that started a while ago and occassional wheezing and palpitations today. No weight gain (pt was 121 upon d/c and was 119 today). She took 4 baby aspirin before coming to the ER today. Denies fevers, chills, chest pain, abdominal pain, N/V, diarrhea/constipation. Pt was previously on Jakafi which she was not responding to and was switched to Inrebic which was stopped d/t multiple side effects. She is due to start a new agent for myelofibrosis next week.     Prior admission:   - 3/29/23: mild acute on chronic CHFpEF     ER course: , SpO2 96% on 3L nasal cannula. Labs: WBC 37.78, Hb 7.1 (baseline ~8), , metamyelocytes 6%, myelocytes 5%, INR 1.37, glucose 144, albumin 3.2, alkaline phosphatase 173, BNP 4436. VBG: pH 7.41, CO2 50, HCO3 23. COVID and RVP negative.  EKG: Sinus tachycardia with  bpm, normal intervals, no ST segment changes, no T wave inversions (personally reviewed). CXR: cardiomegaly, increased vascular congestion b/l, right pleural effusion (personally reviewed).     Pt was given 80 mg IVP lasix. She is being placed on telemetry observation for further management.  (08 Apr 2023 21:31)    Cardiology consulted for increasing SOB - HFpEF.    4/10/23: denies chest pain, still SOB, palpitations; Tele: Sinus Tachy 110s, diuretic changed to bumex    MEDICATIONS  (STANDING):  aspirin enteric coated 81 milliGRAM(s) Oral daily  atorvastatin 10 milliGRAM(s) Oral at bedtime  budesonide 160 MICROgram(s)/formoterol 4.5 MICROgram(s) Inhaler 2 Puff(s) Inhalation two times a day  buMETAnide Injectable 1 milliGRAM(s) IV Push two times a day  cholecalciferol 1000 Unit(s) Oral daily  digoxin     Tablet 125 MICROGram(s) Oral daily  metoprolol succinate ER 50 milliGRAM(s) Oral daily  pantoprazole    Tablet 40 milliGRAM(s) Oral before breakfast  rivaroxaban 15 milliGRAM(s) Oral with dinner  tamsulosin 0.4 milliGRAM(s) Oral at bedtime  tiotropium 2.5 MICROgram(s) Inhaler 2 Puff(s) Inhalation daily      MEDICATIONS  (PRN):  acetaminophen     Tablet .. 650 milliGRAM(s) Oral every 6 hours PRN Temp greater or equal to 38C (100.4F), Mild Pain (1 - 3)  albuterol    90 MICROgram(s) HFA Inhaler 2 Puff(s) Inhalation every 6 hours PRN for shortness of breath and/or wheezing  aluminum hydroxide/magnesium hydroxide/simethicone Suspension 30 milliLiter(s) Oral every 4 hours PRN Dyspepsia  melatonin 3 milliGRAM(s) Oral at bedtime PRN Insomnia  ondansetron Injectable 4 milliGRAM(s) IV Push every 8 hours PRN Nausea and/or Vomiting    Vital Signs Last 24 Hrs  T(C): 36.4 (10 Apr 2023 08:22), Max: 36.8 (09 Apr 2023 17:10)  T(F): 97.5 (10 Apr 2023 08:22), Max: 98.2 (09 Apr 2023 17:10)  HR: 112 (10 Apr 2023 09:15) (92 - 114)  BP: 110/63 (10 Apr 2023 08:22) (94/54 - 114/75)  BP(mean): --  RR: 18 (10 Apr 2023 08:22) (18 - 18)  SpO2: 92% (10 Apr 2023 09:15) (92% - 96%)    Parameters below as of 10 Apr 2023 09:15  Patient On (Oxygen Delivery Method): nasal cannula,3 lpm    PHYSICAL EXAM:  Constitutional: NAD, awake and alert  HEENT:  EOMI,  Pupils round, No oral cyanosis.  Pulmonary: Non-labored, breath sounds are clear bilaterally, No wheezing, rales or rhonchi  Cardiovascular: S1 and S2, sinus tachcyardia, no Murmurs, gallops or rubs  Gastrointestinal: Bowel Sounds present, soft, nontender.   Lymph: +2 B/L LE edema. No cervical lymphadenopathy.  Neurological: Alert, no focal deficits  Skin: No rashes.  Psych:  Mood & affect appropriate    LABS:                          7.0    39.04 )-----------( 705      ( 09 Apr 2023 07:23 )             23.6     04-09    138  |  100  |  31<H>  ----------------------------<  117<H>  4.3   |  37<H>  |  0.67    Ca    8.5      09 Apr 2023 07:23  Mg     1.4     04-08    TPro  7.1  /  Alb  3.1<L>  /  TBili  0.9  /  DBili  x   /  AST  17  /  ALT  16  /  AlkPhos  160<H>  04-09    - TroponinI hsT: <-20.70                        7.0    39.04 )-----------( 705      ( 09 Apr 2023 07:23 )             23.6     04-09    138  |  100  |  31<H>  ----------------------------<  117<H>  4.3   |  37<H>  |  0.67    Ca    8.5      09 Apr 2023 07:23  Mg     1.4     04-08    TPro  7.1  /  Alb  3.1<L>  /  TBili  0.9  /  DBili  x   /  AST  17  /  ALT  16  /  AlkPhos  160<H>  04-09    - TroponinI hsT: <-20.70                        7.0    39.04 )-----------( 705      ( 09 Apr 2023 07:23 )             23.6                         7.1    37.78 )-----------( 726      ( 08 Apr 2023 16:39 )             23.4     09 Apr 2023 07:23    138    |  100    |  31     ----------------------------<  117    4.3     |  37     |  0.67   08 Apr 2023 16:39    136    |  100    |  29     ----------------------------<  144    4.0     |  31     |  0.73     Ca    8.5        09 Apr 2023 07:23  Ca    8.3        08 Apr 2023 16:39  Mg     1.4       08 Apr 2023 16:39    TPro  7.1    /  Alb  3.1    /  TBili  0.9    /  DBili  x      /  AST  17     /  ALT  16     /  AlkPhos  160    09 Apr 2023 07:23  TPro  7.3    /  Alb  3.2    /  TBili  0.9    /  DBili  x      /  AST  25     /  ALT  17     /  AlkPhos  173    08 Apr 2023 16:39    PT/INR - ( 08 Apr 2023 16:39 )   PT: 15.9 sec;   INR: 1.37 ratio         PTT - ( 08 Apr 2023 16:39 )  PTT:36.8 sec    Radiology: reviewed    < from: Xray Chest 1 View- PORTABLE-Urgent (04.08.23 @ 17:07) >    ACC: [id number] EXAM:  XR CHEST PORTABLE URGENT 1V   ORDERED BY: JUDY MEDINA     PROCEDURE DATE:  04/08/2023          INTERPRETATION:  CLINICAL STATEMENT: Chest Pain.    TECHNIQUE: AP view of the chest.      COMPARISON: 3/23/2023      Cardiomegaly.Pulmonary vascular congestion. Increased right base   effusion.    No significant osseous abnormality.    IMPRESSION:    Cardiomegaly. Pulmonary vascular congestion. Increased right base   effusion.    < end of copied text >    -----------------------------------------------------------------------------------------------------  < from: TTE Echo Complete w/o Contrast w/ Doppler (03.15.23 @ 11:22) >   Impression     Summary     The aortic valve is well visualized, appears mildly calcified. Valve   opening seems to be normal.   The ascending aorta appears to be mildly dilated.   The left atrium appears moderately dilated.   The left ventricle is normal in size, wall thickness, wall motion and   contractility.   Estimated left ventricular ejection fraction is 60 %.   IVC is dilated and not collapsing with inspiration.   Mild mitral annular calcification is present.   There is thickening of both mitral valve leaflets. The leaflet opening is   normal.   Mild (1+) mitral regurgitation is present.   A small pericardial effusion is present.   Pleural effusion - is present.   Mild to moderate pulmonic valvular regurgitation (1+) is present.   The right atrium appears mildly dilated.   Normal appearing right ventricle structure and function.   Moderate (2+) tricuspid valve regurgitation is present.     Signature     ----------------------------------------------------------------   Electronically signed by Caroline Perez MD(Interpreting   physician) on 03/15/2023 05:55 PM    < end of copied text >  -----------------------------------------------------------------------------------------------------

## 2023-04-10 NOTE — DIETITIAN INITIAL EVALUATION ADULT - IDEAL BODY WEIGHT (KG)
Clearance Decision: Based on today's exam and history, patient is cleared for sports for 1 year without restrictions.  See [employer] Pre-participation Examination form for details. Form wasreviewed with patient and his mother for accuracy.    PATIENT EDUCATION & FOLLOW-UP INSTRUCTIONS    It is important to follow-up with your PCP for your annual preventative wellness visit.  Continue regular follow-up with your primary care provider or call [phone] for assistance in selecting a primary/family provider.        Concussions     A concussion is any injury to the brain that disrupts normal brain function typically on a temporary basis. Concussions are usually caused by a blow or jolt to the head but may occur from a hit to the body that produces a reflex force to the head.   The following is information from the American Academy of Pediatrics about concussions, including guidance on treatment and prevention.  When do concussions occur?  Concussions can happen in any sport but more often occur in collision sports, such as football, rugby, or ice hockey. They also are common in contact sports that don't require helmets, such as soccer, basketball, wrestling, cheerleading, and lacrosse. Many concussions also occur outside organized sports. For example, a child riding a bike or skateboard can fall down and hit their head on the street or an obstacle.  Symptoms  The symptoms of a concussion range from subtle to obvious and usually happen right after the injury but some may take hours to a few days to develop. Athletes who have had concussions may report feeling normal before their brain has fully recovered. With most concussions, the player is not knocked out or unconscious.  Symptoms of a concussion include the following:  · Headache  · Nausea or vomiting  · Dizziness or balance problems  · Double or blurry vision   · Sensitivity to light   · Sensitivity to noise  · Feeling dazed or stunned  · Feeling mentally  \"foggy\"  · Trouble concentrating  · Trouble remembering  · Confused or forgetful about recent events  · Slow to answer questions  · Changes in mood--irritable, sad, emotional, nervous  · Drowsiness  · Sleeping more or less than usual  · Trouble falling asleep  · What to do if you suspect a concussion  Concussions should be taken seriously and all athletes with suspected concussions should not return to full sports practice or participation until cleared by a doctor. A doctor can confirm the diagnosis of concussion and decide when it is OK for the athlete to return to play. Athletes who continue to play after their injury are at increased risk for worse symptoms, a more prolonged recovery and more serious injury to the brain if additional head injuries occur while recovering.  No one knows how many concussions are too many before permanent damage occurs. Repeated concussions are of greater concern, especially if each one takes longer to resolve or if a repeat concussion occurs from a light blow. The evaluating doctor needs to know about all prior concussions, including those that occurred outside of a sports setting, in order to make proper recommendations regarding return to play and future sports participation.  Treatment  The best treatment for a concussion is reducing physical and mental activity. Children should be monitored often, but there is no need for wake-up checks during sleep. School attendance and work may need to be modified with tests and projects postponed. Students need to be excused from gym class or recess activities. Light physical exertion may be recommended to improve recovery. Physical therapy may be initiated as some problems associated with concussion may need active rehabilitation. Any progressive worsening of concussion symptoms or changes in behavior should be immediately reported to your doctor.  Returning to physical activity  Recovery time from concussion is variable with each injury  but may be prolonged in athletes who continue to play immediately after their concussion. An athlete may feel better and want to return to play before their brain has completely recovered. Given the uncertain and unpredictable time frame for recovery, all formal sports activity should be suspended until symptoms have completely resolved at rest. A stepwise return to physical activity can begin once the athlete has clearance from their doctor. Having an , if available, involved in monitoring this plan can be very helpful. It is important to pay close attention to worsening symptoms (like increasing headache, nausea, or dizziness) while completing the activity progression. Any concussion-related symptoms that return with exertion are a clear indicator that the concussion has not healed. Final clearance to return to full activity should also be at the direction of a physician.  Prevention  Not all concussions can be prevented, but some may be avoided. Helmets should be worn for any riding activities (like horseback, all-terrain vehicle [ATV], motorbike, bike, skateboard, or snowboard) or contact sports (like football, hockey, or lacrosse). Helmets should fit appropriately and be in good condition. Athletes should be taught safe playing techniques and to follow the rules of the game. Most importantly, every athlete needs to know how crucial it is to let their , , or parent know if they have hit their head or have symptoms of a head injury--even if it means stopping play. Never ignore a head injury, no matter how minor.     45.3

## 2023-04-10 NOTE — DIETITIAN INITIAL EVALUATION ADULT - ORAL INTAKE PTA/DIET HISTORY
Pt lives at home w/ ;  does grocery shopping and cooking. Reports "perfect" appetite - ? accuracy, likely consuming <50% of ENN chronically 2/2 SOB, wheezing

## 2023-04-10 NOTE — DIETITIAN NUTRITION RISK NOTIFICATION - TREATMENT: THE FOLLOWING DIET HAS BEEN RECOMMENDED
Diet, DASH/TLC:   Sodium & Cholesterol Restricted  1500mL Fluid Restriction ([id number]) (04-08-23 @ 21:29) [Active]

## 2023-04-10 NOTE — DIETITIAN INITIAL EVALUATION ADULT - PERTINENT LABORATORY DATA
04-09    138  |  100  |  31<H>  ----------------------------<  117<H>  4.3   |  37<H>  |  0.67    Ca    8.5      09 Apr 2023 07:23  Mg     1.4     04-08    TPro  7.1  /  Alb  3.1<L>  /  TBili  0.9  /  DBili  x   /  AST  17  /  ALT  16  /  AlkPhos  160<H>  04-09  A1C with Estimated Average Glucose Result: 5.1 % (04-09-23 @ 07:23)  A1C with Estimated Average Glucose Result: 5.4 % (03-16-23 @ 07:03)

## 2023-04-10 NOTE — DIETITIAN INITIAL EVALUATION ADULT - OTHER INFO
71 y/o F with PMH atrial fibrillation, small-medium pericardial effusion, myelofibrosis, recent pneumonia, renal stone, CHFpEF, mild COPD, on supplemental O2 (1L during the day and 2L at night since hospitalization at [facility]/[place] for PNA) presented with shortness of breath for the last 2 days. She also reports lower extremity swelling that started a while ago and occassional wheezing and palpitations today.   Admit for acute on chronic CHF, normocytic anemia likely anemia of chronic disease in the setting of myelofibrosis     Is full code and with NC 2L oxygen. Reports good po intake and consuming ~75% of trays since admit (x 2 days) - ? accuracy; likely consuming small meals. Reports UBW of 115# x 1 year ago; bed scale wt of 108# taken by RD on 4/10/23. Unintentional wt loss of 7# / 6% wt loss x 1 year ; not clinically significant. NFPE reveals severe muscle/ fat wasting - pt appears thin, frail, kiersten and malnourished; edema could be masking losses, skewing appearance. Liberalize diet to regular to maximize caloric and nutrient intake, c/w 1500mL FR as per MD. Add ensure + HP shake BID (350kcal, 20g protein). See below for other recommendations.

## 2023-04-10 NOTE — PROGRESS NOTE ADULT - SUBJECTIVE AND OBJECTIVE BOX
4/9: feeling better  H: 7.1 stable     4/10: more sob  on 3 L O2 nc  lasix d/katia; iv Bumex started  Vital Signs Last 24 Hrs  T(C): 36.4 (10 Apr 2023 08:22), Max: 36.8 (09 Apr 2023 17:10)  T(F): 97.5 (10 Apr 2023 08:22), Max: 98.2 (09 Apr 2023 17:10)  HR: 112 (10 Apr 2023 09:15) (92 - 114)  BP: 110/63 (10 Apr 2023 08:22) (94/54 - 114/75)  BP(mean): --  RR: 18 (10 Apr 2023 08:22) (18 - 18)  SpO2: 92% (10 Apr 2023 09:15) (92% - 96%)    Parameters below as of 10 Apr 2023 09:15  Patient On (Oxygen Delivery Method): nasal cannula,3 lpm      General: Awake and alert, cooperative with exam. No acute distress.   Skin: Warm, dry, and pink.   Eyes: Pupils equal and reactive to light. Extraocular eye movements intact. No conjunctival injection, discharge, or scleral icterus.   HEENT: Atraumatic, normocephalic. Moist mucus membranes.  Cardiology: Normal S1, S2. No murmurs, rubs, or gallops. Irregularly irregular.   Respiratory: Crackles at the bases b/l. No wheezes, rales, or rhonchi. Normal chest expansion.   Gastrointestinal: Positive bowel sounds. Soft, non-tender, non-distended. No guarding, rigidity, or rebound tenderness. No hepatosplenomegaly.   Musculoskeletal: 5/5 motor strength in all extremities. Normal range of motion.   Extremities: 1+ pitting edema bilaterally. Dorsalis pedis pulses 2+ bilaterally.   Neurological: A+Ox3 (person, place, and time). Cranial nerves 2-12 intact. Normal speech. No facial droop. No focal neurological deficits.  Psychiatric: Normal affect. Normal mood                               7.0    39.04 )-----------( 705      ( 09 Apr 2023 07:23 )             23.6     04-09    138  |  100  |  31<H>  ----------------------------<  117<H>  4.3   |  37<H>  |  0.67    Ca    8.5      09 Apr 2023 07:23  Mg     1.4     04-08    TPro  7.1  /  Alb  3.1<L>  /  TBili  0.9  /  DBili  x   /  AST  17  /  ALT  16  /  AlkPhos  160<H>  04-09    - TroponinI hsT: <-20.70    Radiology:    < from: Xray Chest 1 View- PORTABLE-Urgent (04.08.23 @ 17:07) >    ACC: [id number] EXAM:  XR CHEST PORTABLE URGENT 1V   ORDERED BY: JUDY MEDINA     PROCEDURE DATE:  04/08/2023          INTERPRETATION:  CLINICAL STATEMENT: Chest Pain.    TECHNIQUE: AP view of the chest.      COMPARISON: 3/23/2023      Cardiomegaly.Pulmonary vascular congestion. Increased right base   effusion.    No significant osseous abnormality.    IMPRESSION:    Cardiomegaly. Pulmonary vascular congestion. Increased right base   effusion.    --- End of Report ---            STEVE VAUGHN MD; Attending Radiologist  This document has been electronically signed. Apr 9 2023 11:46AM    < end of copied text >    MEDICATIONS  (STANDING):  aspirin enteric coated 81 milliGRAM(s) Oral daily  atorvastatin 10 milliGRAM(s) Oral at bedtime  budesonide 160 MICROgram(s)/formoterol 4.5 MICROgram(s) Inhaler 2 Puff(s) Inhalation two times a day  buMETAnide Injectable 1 milliGRAM(s) IV Push two times a day  cholecalciferol 1000 Unit(s) Oral daily  digoxin     Tablet 125 MICROGram(s) Oral daily  metoprolol succinate ER 50 milliGRAM(s) Oral daily  pantoprazole    Tablet 40 milliGRAM(s) Oral before breakfast  rivaroxaban 15 milliGRAM(s) Oral with dinner  tamsulosin 0.4 milliGRAM(s) Oral at bedtime  tiotropium 2.5 MICROgram(s) Inhaler 2 Puff(s) Inhalation daily    MEDICATIONS  (PRN):  acetaminophen     Tablet .. 650 milliGRAM(s) Oral every 6 hours PRN Temp greater or equal to 38C (100.4F), Mild Pain (1 - 3)  albuterol    90 MICROgram(s) HFA Inhaler 2 Puff(s) Inhalation every 6 hours PRN for shortness of breath and/or wheezing  aluminum hydroxide/magnesium hydroxide/simethicone Suspension 30 milliLiter(s) Oral every 4 hours PRN Dyspepsia  melatonin 3 milliGRAM(s) Oral at bedtime PRN Insomnia  ondansetron Injectable 4 milliGRAM(s) IV Push every 8 hours PRN Nausea and/or Vomiting

## 2023-04-10 NOTE — PROGRESS NOTE ADULT - PROBLEM SELECTOR PLAN 2
·  Problem: Afib.   ·  Recommendation: History of atrial fibrillation, small-medium pericardial effusion, myelofibrosis, recent pneumonia hospialized at [facility]  Now with sinus tachycardia due to fluid overload  Recommendation. cont. asa, eliquis, BB, no improvement with lasix - change to bumex 1 m ivp bid - D/W Dr. Hernandez

## 2023-04-10 NOTE — PROGRESS NOTE ADULT - PROBLEM SELECTOR PLAN 1
·  Problem: Acute on chronic diastolic congestive heart failure.   ·  Recommendation: pt. recently discharged end of March, now with SOB, seems fluid overloaded, Echo in 3/23 - preserved LVEF 60%, moderate TR, pleural effusion, elevated [id number]  Recommendation: no improvement on lasix - changed to bumex 1 mg ivp id, cont. BB, daily weight, strict i&0s, fluid restriction 1.5L/day, recheck BNP tomorrow - ordered

## 2023-04-10 NOTE — DIETITIAN INITIAL EVALUATION ADULT - NSFNSGIIOFT_GEN_A_CORE
I&O's Detail    09 Apr 2023 07:01  -  10 Apr 2023 07:00  --------------------------------------------------------  IN:  Total IN: 0 mL    OUT:    Voided (mL): 900 mL  Total OUT: 900 mL    Total NET: -900 mL

## 2023-04-10 NOTE — DIETITIAN INITIAL EVALUATION ADULT - PERTINENT MEDS FT
MEDICATIONS  (STANDING):  aspirin enteric coated 81 milliGRAM(s) Oral daily  atorvastatin 10 milliGRAM(s) Oral at bedtime  budesonide 160 MICROgram(s)/formoterol 4.5 MICROgram(s) Inhaler 2 Puff(s) Inhalation two times a day  buMETAnide Injectable 1 milliGRAM(s) IV Push two times a day  cholecalciferol 1000 Unit(s) Oral daily  digoxin     Tablet 125 MICROGram(s) Oral daily  metoprolol succinate ER 50 milliGRAM(s) Oral daily  pantoprazole    Tablet 40 milliGRAM(s) Oral before breakfast  rivaroxaban 15 milliGRAM(s) Oral with dinner  tamsulosin 0.4 milliGRAM(s) Oral at bedtime  tiotropium 2.5 MICROgram(s) Inhaler 2 Puff(s) Inhalation daily    MEDICATIONS  (PRN):  acetaminophen     Tablet .. 650 milliGRAM(s) Oral every 6 hours PRN Temp greater or equal to 38C (100.4F), Mild Pain (1 - 3)  albuterol    90 MICROgram(s) HFA Inhaler 2 Puff(s) Inhalation every 6 hours PRN for shortness of breath and/or wheezing  aluminum hydroxide/magnesium hydroxide/simethicone Suspension 30 milliLiter(s) Oral every 4 hours PRN Dyspepsia  melatonin 3 milliGRAM(s) Oral at bedtime PRN Insomnia  ondansetron Injectable 4 milliGRAM(s) IV Push every 8 hours PRN Nausea and/or Vomiting

## 2023-04-11 LAB
DIGOXIN SERPL-MCNC: 1.96 NG/ML — SIGNIFICANT CHANGE UP (ref 0.8–2)
HCT VFR BLD CALC: 20.5 % — CRITICAL LOW (ref 34.5–45)
HGB BLD-MCNC: 6 G/DL — CRITICAL LOW (ref 11.5–15.5)
MCHC RBC-ENTMCNC: 27.9 PG — SIGNIFICANT CHANGE UP (ref 27–34)
MCHC RBC-ENTMCNC: 29.3 GM/DL — LOW (ref 32–36)
MCV RBC AUTO: 95.3 FL — SIGNIFICANT CHANGE UP (ref 80–100)
NT-PROBNP SERPL-SCNC: 5898 PG/ML — HIGH (ref 0–125)
PLATELET # BLD AUTO: 532 K/UL — HIGH (ref 150–400)
RBC # BLD: 2.15 M/UL — LOW (ref 3.8–5.2)
RBC # FLD: 25.8 % — HIGH (ref 10.3–14.5)
WBC # BLD: 32.54 K/UL — HIGH (ref 3.8–10.5)
WBC # FLD AUTO: 32.54 K/UL — HIGH (ref 3.8–10.5)

## 2023-04-11 PROCEDURE — 99233 SBSQ HOSP IP/OBS HIGH 50: CPT

## 2023-04-11 PROCEDURE — 99232 SBSQ HOSP IP/OBS MODERATE 35: CPT

## 2023-04-11 RX ADMIN — Medication 50 MILLIGRAM(S): at 09:32

## 2023-04-11 RX ADMIN — ATORVASTATIN CALCIUM 10 MILLIGRAM(S): 80 TABLET, FILM COATED ORAL at 21:05

## 2023-04-11 RX ADMIN — TIOTROPIUM BROMIDE 2 PUFF(S): 18 CAPSULE ORAL; RESPIRATORY (INHALATION) at 08:45

## 2023-04-11 RX ADMIN — Medication 81 MILLIGRAM(S): at 09:31

## 2023-04-11 RX ADMIN — BUMETANIDE 1 MILLIGRAM(S): 0.25 INJECTION INTRAMUSCULAR; INTRAVENOUS at 16:54

## 2023-04-11 RX ADMIN — PANTOPRAZOLE SODIUM 40 MILLIGRAM(S): 20 TABLET, DELAYED RELEASE ORAL at 06:05

## 2023-04-11 RX ADMIN — Medication 125 MICROGRAM(S): at 09:32

## 2023-04-11 RX ADMIN — RIVAROXABAN 15 MILLIGRAM(S): KIT at 16:52

## 2023-04-11 RX ADMIN — Medication 1000 UNIT(S): at 09:32

## 2023-04-11 RX ADMIN — BUDESONIDE AND FORMOTEROL FUMARATE DIHYDRATE 2 PUFF(S): 160; 4.5 AEROSOL RESPIRATORY (INHALATION) at 08:44

## 2023-04-11 RX ADMIN — BUMETANIDE 1 MILLIGRAM(S): 0.25 INJECTION INTRAMUSCULAR; INTRAVENOUS at 06:05

## 2023-04-11 RX ADMIN — BUDESONIDE AND FORMOTEROL FUMARATE DIHYDRATE 2 PUFF(S): 160; 4.5 AEROSOL RESPIRATORY (INHALATION) at 21:02

## 2023-04-11 RX ADMIN — Medication 3 MILLIGRAM(S): at 21:07

## 2023-04-11 NOTE — PROVIDER CONTACT NOTE (CRITICAL VALUE NOTIFICATION) - NS PROVIDER READ BACK TO LAB
Hospitalist Admission Note    NAME: Kenn Lopez   :  1938   MRN:  [id number]     Date/Time:  2018 8:24 PM    Patient PCP: Azalia Bray MD  ________________________________________________________________________    My assessment of this patient's clinical condition and my plan of care is as follows. Assessment / Plan:    1) Stroke: Patient with hx of previous strokes. The symptoms started last night so no candidate for TPA. At this time apparently patient recovering some how. CT head no acute findings. Will follow stroke protocol order set. Also will order brain MRI W/WO, MRA head/Neck W/WO, echocardiogram, Neurology consult    2) Hx CAD: no issues at this time  Continue home meds at this time after s/s eval    3) Hyperlipidemia ; Continue home meds (after s/s eval)      Code Status: full  Surrogate Decision Maker:    DVT Prophylaxis: yes  GI Prophylaxis: not indicated    Baseline: lives at home with family        Subjective:   CHIEF COMPLAINT: Slurred speech, LUE weakness    HISTORY OF PRESENT ILLNESS:     Brandon Bain is a 78 y.o. PMH Strokes, CAD,hyperlipidemia who presents from home after family noticed since last night that the patient was having  Slurred speech \" more than usual\" and her LUE was weaker than usual. They did not think much of it last night , but this morning she was still feeling the same reason why they came here for further eval. Patient denies fevers, CP,SOB or any other symptom    We were asked to admit for work up and evaluation of the above problems. Past Medical History:   Diagnosis Date    Acute [facility] failure ([facility] [address] [facility]     Acute systolic heart failure ([facility] [address] 2016    CAD (coronary artery disease)     Heart failure ([facility] [address] [facility]     Stroke [facility])     Stroke    Stroke [facility])         History reviewed. No pertinent surgical history.     Social History   Substance Use Topics    Smoking status: Former Smoker     Quit date: 2009   
yes
Smokeless tobacco: Never Used    Alcohol use 0.0 oz/week     0 Standard drinks or equivalent per week      Comment: Very little        Family History   Problem Relation Age of Onset    Coronary Artery Disease Other      grandmother age 76     Allergies   Allergen Reactions    Decadron [Dexamethasone Sodium Phosphate] Hives    Pcn [Penicillins] Hives        Prior to Admission medications    Medication Sig Start Date End Date Taking? Authorizing Provider   potassium chloride (K-DUR, KLOR-CON) 20 mEq tablet Take 20 mEq by mouth daily. Yes Gildardo Wood MD   amLODIPine (NORVASC) 2.5 mg tablet Take 1 Tab by mouth daily. 10/18/17  Yes ROME Biswas   furosemide (LASIX) 40 mg tablet Take 1 Tab by mouth daily. One pill daily 10/18/17  Yes ROME Biswas   atorvastatin (LIPITOR) 40 mg tablet TAKE ONE TABLET BY MOUTH EVERY DAY 10/18/17  Yes ROME Biswas   aspirin delayed-release 81 mg tablet Take 1 Tab by mouth daily. 10/18/17  Yes ROEM Biswas   lisinopril (PRINIVIL, ZESTRIL) 2.5 mg tablet Take 1 Tab by mouth daily. 10/18/17  Yes ROME Biswas   carvedilol (COREG) 12.5 mg tablet Take 1 Tab by mouth two (2) times a day. 10/18/17  Yes ROME Biswas   isosorbide mononitrate ER (IMDUR) 30 mg tablet Take 1 Tab by mouth every morning. 10/18/17  Yes ROME Biswas   albuterol (PROVENTIL HFA, VENTOLIN HFA, PROAIR HFA) 90 mcg/actuation inhaler Take 2 Puffs by inhalation every four (4) hours as needed for Wheezing. 7/21/16  Yes KESHIA Staples       REVIEW OF SYSTEMS:     I am not able to complete the review of systems because:    The patient is intubated and sedated    The patient has altered mental status due to his acute medical problems    The patient has baseline aphasia from prior stroke(s)    The patient has baseline dementia and is not reliable historian    The patient is in acute medical distress and unable to provide information           Total of 12 systems reviewed as follows:
POSITIVE= underlined text  Negative = text not underlined  General:  fever, chills, sweats, generalized weakness, weight loss/gain,      loss of appetite   Eyes:    blurred vision, eye pain, loss of vision, double vision  ENT:    rhinorrhea, pharyngitis   Respiratory:   cough, sputum production, SOB, CHRISTINE, wheezing, pleuritic pain   Cardiology:   chest pain, palpitations, orthopnea, PND, edema, syncope   Gastrointestinal:  abdominal pain , N/V, diarrhea, dysphagia, constipation, bleeding   Genitourinary:  frequency, urgency, dysuria, hematuria, incontinence   Muskuloskeletal :  arthralgia, myalgia, back pain  Hematology:  easy bruising, nose or gum bleeding, lymphadenopathy   Dermatological: rash, ulceration, pruritis, color change / jaundice  Endocrine:   hot flashes or polydipsia   Neurological:  headache, dizziness, confusion, focal weakness, paresthesia,     Speech difficulties, memory loss, gait difficulty  Psychological: Feelings of anxiety, depression, agitation    Objective:   VITALS:    Visit Vitals    /78    Pulse 79    Temp 97.5 °F (36.4 °C)    Resp 20    Ht 5' (1.524 m)    Wt 63.5 kg (140 lb)    SpO2 97%    BMI 27.34 kg/m2       PHYSICAL EXAM:    General:    Alert, cooperative, no distress, appears stated age. HEENT: Atraumatic, anicteric sclerae, pink conjunctivae     No oral ulcers, mucosa moist, throat clear, dentition fair  Neck:  Supple, symmetrical,  thyroid: non tender  Lungs:   Clear to auscultation bilaterally. No Wheezing or Rhonchi. No rales. Chest wall:  No tenderness  No Accessory muscle use. Heart:   Regular  rhythm,  No  murmur   No edema  Abdomen:   Soft, non-tender. Not distended. Bowel sounds normal  Extremities: No cyanosis. No clubbing,      Skin turgor normal, Capillary refill normal, Radial dial pulse 2+  Skin:     Not pale. Not Jaundiced  No rashes   Psych:  Good insight. Not depressed. Not anxious or agitated. Neurologic: EOMs intact.  facial
asymmetry. slurred speech. Symmetrical strength, Sensation grossly intact. Alert and oriented, weakness LUE      _______________________________________________________________________  Care Plan discussed with:    Comments   Patient x    Family      RN     Care Manager                    Consultant:      _______________________________________________________________________  Expected  Disposition:   Home with Family x   HH/PT/OT/RN    SNF/LTC    SOLA    ________________________________________________________________________  TOTAL TIME:  48 Minutes    Critical Care Provided     Minutes non procedure based      Comments    x Reviewed previous records   >50% of visit spent in counseling and coordination of care  Discussion with patient and/or family and questions answered       ________________________________________________________________________  Signed: Leonardo Bansal MD    Procedures: see electronic medical records for all procedures/Xrays and details which were not copied into this note but were reviewed prior to creation of Plan. LAB DATA REVIEWED:    Recent Results (from the past 24 hour(s))   CBC WITH AUTOMATED DIFF    Collection Time: 02/05/18  4:47 PM   Result Value Ref Range    WBC 6.7 3.6 - 11.0 K/uL    RBC 4.28 3.80 - 5.20 M/uL    HGB 11.5 11.5 - 16.0 g/dL    HCT 34.4 (L) 35.0 - 47.0 %    MCV 80.4 80.0 - 99.0 FL    MCH 26.9 26.0 - 34.0 PG    MCHC 33.4 30.0 - 36.5 g/dL    RDW 13.6 11.5 - 14.5 %    PLATELET 864 877 - 869 K/uL    MPV 11.5 8.9 - 12.9 FL    NRBC 0.0 0  WBC    ABSOLUTE NRBC 0.00 0.00 - 0.01 K/uL    NEUTROPHILS 66 32 - 75 %    LYMPHOCYTES 23 12 - 49 %    MONOCYTES 9 5 - 13 %    EOSINOPHILS 2 0 - 7 %    BASOPHILS 0 0 - 1 %    IMMATURE GRANULOCYTES 0 0.0 - 0.5 %    ABS. NEUTROPHILS 4.4 1.8 - 8.0 K/UL    ABS. LYMPHOCYTES 1.6 0.8 - 3.5 K/UL    ABS. MONOCYTES 0.6 0.0 - 1.0 K/UL    ABS. EOSINOPHILS 0.1 0.0 - 0.4 K/UL    ABS. BASOPHILS 0.0 0.0 - 0.1 K/UL    ABS. IMM. GRANS.
yes
0.0 0.00 - 0.04 K/UL    DF AUTOMATED     METABOLIC PANEL, BASIC    Collection Time: 02/05/18  4:47 PM   Result Value Ref Range    Sodium 142 136 - 145 mmol/L    Potassium 4.5 3.5 - 5.1 mmol/L    Chloride 108 97 - 108 mmol/L    CO2 29 21 - 32 mmol/L    Anion gap 5 5 - 15 mmol/L    Glucose 120 (H) 65 - 100 mg/dL    BUN 29 (H) 6 - 20 MG/DL    Creatinine 1.72 (H) 0.55 - 1.02 MG/DL    BUN/Creatinine ratio 17 12 - 20      GFR est AA 35 (L) >60 ml/min/1.73m2    GFR est non-AA 29 (L) >60 ml/min/1.73m2    Calcium 8.8 8.5 - 10.1 MG/DL   PROTHROMBIN TIME + INR    Collection Time: 02/05/18  4:47 PM   Result Value Ref Range    INR 1.0 0.9 - 1.1      Prothrombin time 10.4 9.0 - 11.1 sec   PTT    Collection Time: 02/05/18  4:47 PM   Result Value Ref Range    aPTT 27.4 22.1 - 32.5 sec    aPTT, therapeutic range     58.0 - 77.0 SECS   EKG, 12 LEAD, INITIAL    Collection Time: 02/05/18  4:57 PM   Result Value Ref Range    Ventricular Rate 70 BPM    Atrial Rate 70 BPM    P-R Interval 142 ms    QRS Duration 128 ms    Q-T Interval 442 ms    QTC Calculation (Bezet) 477 ms    Calculated P Axis 48 degrees    Calculated R Axis -26 degrees    Calculated T Axis 142 degrees    Diagnosis       Normal sinus rhythm  Left bundle branch block  Abnormal ECG  When compared with ECG of 01-AUG-2017 12:56,  No significant change was found

## 2023-04-11 NOTE — PROGRESS NOTE ADULT - SUBJECTIVE AND OBJECTIVE BOX
4/9: feeling better  H: 7.1 stable     4/10: more sob  on 3 L O2 nc  lasix d/katia; iv Bumex started    4/11: went into A fib + RVR  Hb dropped to 6; 1 unit PRBC  Dig level normal.  BP borderline  FOBT neg    Vital Signs Last 24 Hrs  T(C): 37 (11 Apr 2023 08:26), Max: 37 (11 Apr 2023 08:26)  T(F): 98.6 (11 Apr 2023 08:26), Max: 98.6 (11 Apr 2023 08:26)  HR: 101 (11 Apr 2023 08:26) (90 - 104)  BP: 98/70 (11 Apr 2023 08:26) (98/70 - 105/67)  BP(mean): --  RR: 18 (11 Apr 2023 08:26) (18 - 18)  SpO2: 92% (11 Apr 2023 08:26) (92% - 100%)    Parameters below as of 11 Apr 2023 08:26  Patient On (Oxygen Delivery Method): nasal cannula          General: Awake and alert, cooperative with exam. No acute distress.   Skin: Warm, dry, and pink.   Eyes: Pupils equal and reactive to light. Extraocular eye movements intact. No conjunctival injection, discharge, or scleral icterus.   HEENT: Atraumatic, normocephalic. Moist mucus membranes.  Cardiology: Normal S1, S2. No murmurs, rubs, or gallops. Irregularly irregular.   Respiratory: Crackles at the bases b/l. No wheezes, rales, or rhonchi. Normal chest expansion.   Gastrointestinal: Positive bowel sounds. Soft, non-tender, non-distended. No guarding, rigidity, or rebound tenderness. No hepatosplenomegaly.   Musculoskeletal: 5/5 motor strength in all extremities. Normal range of motion.   Extremities: 1+ pitting edema bilaterally. Dorsalis pedis pulses 2+ bilaterally.   Neurological: A+Ox3 (person, place, and time). Cranial nerves 2-12 intact. Normal speech. No facial droop. No focal neurological deficits.  Psychiatric: Normal affect. Normal mood                          6.0    32.54 )-----------( 532      ( 11 Apr 2023 10:56 )             20.5                              7.0    39.04 )-----------( 705      ( 09 Apr 2023 07:23 )             23.6     04-09    138  |  100  |  31<H>  ----------------------------<  117<H>  4.3   |  37<H>  |  0.67    Ca    8.5      09 Apr 2023 07:23  Mg     1.4     04-08    TPro  7.1  /  Alb  3.1<L>  /  TBili  0.9  /  DBili  x   /  AST  17  /  ALT  16  /  AlkPhos  160<H>  04-09    - TroponinI hsT: <-20.70    Radiology:    < from: Xray Chest 1 View- PORTABLE-Urgent (04.08.23 @ 17:07) >    ACC: [id number] EXAM:  XR CHEST PORTABLE URGENT 1V   ORDERED BY: JUDY MEDINA     PROCEDURE DATE:  04/08/2023          INTERPRETATION:  CLINICAL STATEMENT: Chest Pain.    TECHNIQUE: AP view of the chest.      COMPARISON: 3/23/2023      Cardiomegaly.Pulmonary vascular congestion. Increased right base   effusion.    No significant osseous abnormality.    IMPRESSION:    Cardiomegaly. Pulmonary vascular congestion. Increased right base   effusion.    --- End of Report ---            STEVE VAUGHN MD; Attending Radiologist  This document has been electronically signed. Apr 9 2023 11:46AM    < end of copied text >    MEDICATIONS  (STANDING):  aspirin enteric coated 81 milliGRAM(s) Oral daily  atorvastatin 10 milliGRAM(s) Oral at bedtime  budesonide 160 MICROgram(s)/formoterol 4.5 MICROgram(s) Inhaler 2 Puff(s) Inhalation two times a day  buMETAnide Injectable 1 milliGRAM(s) IV Push two times a day  cholecalciferol 1000 Unit(s) Oral daily  digoxin     Tablet 125 MICROGram(s) Oral daily  metoprolol succinate ER 50 milliGRAM(s) Oral daily  pantoprazole    Tablet 40 milliGRAM(s) Oral before breakfast  rivaroxaban 15 milliGRAM(s) Oral with dinner  tamsulosin 0.4 milliGRAM(s) Oral at bedtime  tiotropium 2.5 MICROgram(s) Inhaler 2 Puff(s) Inhalation daily    MEDICATIONS  (PRN):  acetaminophen     Tablet .. 650 milliGRAM(s) Oral every 6 hours PRN Temp greater or equal to 38C (100.4F), Mild Pain (1 - 3)  albuterol    90 MICROgram(s) HFA Inhaler 2 Puff(s) Inhalation every 6 hours PRN for shortness of breath and/or wheezing  aluminum hydroxide/magnesium hydroxide/simethicone Suspension 30 milliLiter(s) Oral every 4 hours PRN Dyspepsia  melatonin 3 milliGRAM(s) Oral at bedtime PRN Insomnia  ondansetron Injectable 4 milliGRAM(s) IV Push every 8 hours PRN Nausea and/or Vomiting

## 2023-04-11 NOTE — PROGRESS NOTE ADULT - PROBLEM SELECTOR PLAN 1
·  Problem: Acute on chronic diastolic congestive heart failure.   ·  Recommendation: pt. recently discharged end of March, now with SOB, Elevated BNP 4436;  Echo in 3/23 - preserved LVEF 60%, moderate TR, pleural effusion  .  Weight improved since changing lasix to bumex.  Continue current dose 1 mg ivp id, cont. BB, daily weight, strict i&0s, fluid restriction 1.5L/day,  Repeat BNP pending ·  Problem: Acute on chronic diastolic congestive heart failure.   ·  Recommendation: pt. recently discharged end of March, now with SOB, Elevated BNP 4436;  Echo in 3/23 - preserved LVEF 60%, moderate TR, pleural effusion  .  Weight improved since changing lasix to bumex.  Continue current dose 1 mg ivp Bid, cont. BB, daily weight, strict i&0s, fluid restriction 1.5L/day,  Repeat BNP pending

## 2023-04-11 NOTE — CHART NOTE - NSCHARTNOTEFT_GEN_A_CORE
notified by nurse eariler for pt from sinus to a fib at 90bpm. other VSS, pt asymptomatic. on xarelto    no intervention needed overnight

## 2023-04-11 NOTE — PROGRESS NOTE ADULT - SUBJECTIVE AND OBJECTIVE BOX
CHIEF COMPLAINT: Patient is a 72y old  Female who presents with a chief complaint of     HPI:  71 y/o F with PMH atrial fibrillation, small-medium pericardial effusion, myelofibrosis, recent pneumonia, renal stone, CHFpEF, mild COPD, on supplemental O2 (1L during the day and 2L at night since hospitalization at [facility] for PNA) presented with shortness of breath for the last 2 days. She also reports lower extremity swelling that started a while ago and occassional wheezing and palpitations today. No weight gain (pt was 121 upon d/c and was 119 today). She took 4 baby aspirin before coming to the ER today. Denies fevers, chills, chest pain, abdominal pain, N/V, diarrhea/constipation. Pt was previously on Jakafi which she was not responding to and was switched to Inrebic which was stopped d/t multiple side effects. She is due to start a new agent for myelofibrosis next week.     Prior admission:   - 3/29/23: mild acute on chronic CHFpEF     ER course: , SpO2 96% on 3L nasal cannula. Labs: WBC 37.78, Hb 7.1 (baseline ~8), , metamyelocytes 6%, myelocytes 5%, INR 1.37, glucose 144, albumin 3.2, alkaline phosphatase 173, BNP 4436. VBG: pH 7.41, CO2 50, HCO3 23. COVID and RVP negative.  EKG: Sinus tachycardia with  bpm, normal intervals, no ST segment changes, no T wave inversions (personally reviewed). CXR: cardiomegaly, increased vascular congestion b/l, right pleural effusion (personally reviewed).     Pt was given 80 mg IVP lasix. She is being placed on telemetry observation for further management.  (08 Apr 2023 21:31)    Cardiology consulted for increasing SOB - HFpEF.    4/10/23: denies chest pain, still SOB, palpitations; Tele: Sinus Tachy 110s, diuretic changed to bumex  4/11/23: Offers no complaints of cp/sob/palpitations. Tele: afib 100-115    MEDICATIONS  (STANDING):  aspirin enteric coated 81 milliGRAM(s) Oral daily  atorvastatin 10 milliGRAM(s) Oral at bedtime  budesonide 160 MICROgram(s)/formoterol 4.5 MICROgram(s) Inhaler 2 Puff(s) Inhalation two times a day  buMETAnide Injectable 1 milliGRAM(s) IV Push two times a day  cholecalciferol 1000 Unit(s) Oral daily  digoxin     Tablet 125 MICROGram(s) Oral daily  metoprolol succinate ER 50 milliGRAM(s) Oral daily  pantoprazole    Tablet 40 milliGRAM(s) Oral before breakfast  rivaroxaban 15 milliGRAM(s) Oral with dinner  tamsulosin 0.4 milliGRAM(s) Oral at bedtime  tiotropium 2.5 MICROgram(s) Inhaler 2 Puff(s) Inhalation daily    MEDICATIONS  (PRN):  acetaminophen     Tablet .. 650 milliGRAM(s) Oral every 6 hours PRN Temp greater or equal to 38C (100.4F), Mild Pain (1 - 3)  albuterol    90 MICROgram(s) HFA Inhaler 2 Puff(s) Inhalation every 6 hours PRN for shortness of breath and/or wheezing  aluminum hydroxide/magnesium hydroxide/simethicone Suspension 30 milliLiter(s) Oral every 4 hours PRN Dyspepsia  melatonin 3 milliGRAM(s) Oral at bedtime PRN Insomnia  ondansetron Injectable 4 milliGRAM(s) IV Push every 8 hours PRN Nausea and/or Vomiting      Vital Signs Last 24 Hrs  T(C): 37 (11 Apr 2023 08:26), Max: 37 (11 Apr 2023 08:26)  T(F): 98.6 (11 Apr 2023 08:26), Max: 98.6 (11 Apr 2023 08:26)  HR: 101 (11 Apr 2023 08:26) (90 - 104)  BP: 98/70 (11 Apr 2023 08:26) (98/70 - 105/67)  BP(mean): --  RR: 18 (11 Apr 2023 08:26) (18 - 18)  SpO2: 92% (11 Apr 2023 08:26) (92% - 100%)    Parameters below as of 11 Apr 2023 08:26  Patient On (Oxygen Delivery Method): nasal cannula      PHYSICAL EXAM:  Constitutional: NAD, awake and alert  HEENT:  EOMI,  Pupils round, No oral cyanosis.  Pulmonary: Non-labored, breath sounds are clear bilaterally  Cardiovascular: S1 and S2, no Murmurs, gallops or rubs  Gastrointestinal: Bowel Sounds present, soft, nontender.   Lymph: No edema  Neurological: Alert, no focal deficits  Skin: No rashes.      LABS:                              7.0    39.04 )-----------( 705      ( 09 Apr 2023 07:23 )             23.6         04-09    138  |  100  |  31<H>  ----------------------------<  117<H>  4.3   |  37<H>  |  0.67    Ca    8.5      09 Apr 2023 07:23  Mg     1.4     04-08    TPro  7.1  /  Alb  3.1<L>  /  TBili  0.9  /  DBili  x   /  AST  17  /  ALT  16  /  AlkPhos  160<H>  04-09    - TroponinI hsT: <-20.70                        7.0    39.04 )-----------( 705      ( 09 Apr 2023 07:23 )             23.6     04-09    138  |  100  |  31<H>  ----------------------------<  117<H>  4.3   |  37<H>  |  0.67    Ca    8.5      09 Apr 2023 07:23  Mg     1.4     04-08    TPro  7.1  /  Alb  3.1<L>  /  TBili  0.9  /  DBili  x   /  AST  17  /  ALT  16  /  AlkPhos  160<H>  04-09    - TroponinI hsT: <-20.70                        7.0    39.04 )-----------( 705      ( 09 Apr 2023 07:23 )             23.6                         7.1    37.78 )-----------( 726      ( 08 Apr 2023 16:39 )             23.4     09 Apr 2023 07:23    138    |  100    |  31     ----------------------------<  117    4.3     |  37     |  0.67   08 Apr 2023 16:39    136    |  100    |  29     ----------------------------<  144    4.0     |  31     |  0.73     Ca    8.5        09 Apr 2023 07:23  Ca    8.3        08 Apr 2023 16:39  Mg     1.4       08 Apr 2023 16:39    TPro  7.1    /  Alb  3.1    /  TBili  0.9    /  DBili  x      /  AST  17     /  ALT  16     /  AlkPhos  160    09 Apr 2023 07:23  TPro  7.3    /  Alb  3.2    /  TBili  0.9    /  DBili  x      /  AST  25     /  ALT  17     /  AlkPhos  173    08 Apr 2023 16:39    PT/INR - ( 08 Apr 2023 16:39 )   PT: 15.9 sec;   INR: 1.37 ratio         PTT - ( 08 Apr 2023 16:39 )  PTT:36.8 sec        Radiology: reviewed    < from: Xray Chest 1 View- PORTABLE-Urgent (04.08.23 @ 17:07) >    ACC: [id number] EXAM:  XR CHEST PORTABLE URGENT 1V   ORDERED BY: JUDY MEDINA     PROCEDURE DATE:  04/08/2023          INTERPRETATION:  CLINICAL STATEMENT: Chest Pain.    TECHNIQUE: AP view of the chest.      COMPARISON: 3/23/2023      Cardiomegaly.Pulmonary vascular congestion. Increased right base   effusion.    No significant osseous abnormality.    IMPRESSION:    Cardiomegaly. Pulmonary vascular congestion. Increased right base   effusion.    < end of copied text >    -----------------------------------------------------------------------------------------------------  < from: TTE Echo Complete w/o Contrast w/ Doppler (03.15.23 @ 11:22) >   Impression     Summary     The aortic valve is well visualized, appears mildly calcified. Valve   opening seems to be normal.   The ascending aorta appears to be mildly dilated.   The left atrium appears moderately dilated.   The left ventricle is normal in size, wall thickness, wall motion and   contractility.   Estimated left ventricular ejection fraction is 60 %.   IVC is dilated and not collapsing with inspiration.   Mild mitral annular calcification is present.   There is thickening of both mitral valve leaflets. The leaflet opening is   normal.   Mild (1+) mitral regurgitation is present.   A small pericardial effusion is present.   Pleural effusion - is present.   Mild to moderate pulmonic valvular regurgitation (1+) is present.   The right atrium appears mildly dilated.   Normal appearing right ventricle structure and function.   Moderate (2+) tricuspid valve regurgitation is present.     Signature     ----------------------------------------------------------------   Electronically signed by Caroline Perez MD(Interpreting   physician) on 03/15/2023 05:55 PM    < end of copied text >  -----------------------------------------------------------------------------------------------------         CHIEF COMPLAINT: Patient is a 72y old  Female who presents with a chief complaint of     HPI:  71 y/o F with PMH atrial fibrillation, small-medium pericardial effusion, myelofibrosis, recent pneumonia, renal stone, CHFpEF, mild COPD, on supplemental O2 (1L during the day and 2L at night since hospitalization at [facility] for PNA) presented with shortness of breath for the last 2 days. She also reports lower extremity swelling that started a while ago and occassional wheezing and palpitations today. No weight gain (pt was 121 upon d/c and was 119 today). She took 4 baby aspirin before coming to the ER today. Denies fevers, chills, chest pain, abdominal pain, N/V, diarrhea/constipation. Pt was previously on Jakafi which she was not responding to and was switched to Inrebic which was stopped d/t multiple side effects. She is due to start a new agent for myelofibrosis next week.     Prior admission:   - 3/29/23: mild acute on chronic CHFpEF     ER course: , SpO2 96% on 3L nasal cannula. Labs: WBC 37.78, Hb 7.1 (baseline ~8), , metamyelocytes 6%, myelocytes 5%, INR 1.37, glucose 144, albumin 3.2, alkaline phosphatase 173, BNP 4436. VBG: pH 7.41, CO2 50, HCO3 23. COVID and RVP negative.  EKG: Sinus tachycardia with  bpm, normal intervals, no ST segment changes, no T wave inversions (personally reviewed). CXR: cardiomegaly, increased vascular congestion b/l, right pleural effusion (personally reviewed).     Pt was given 80 mg IVP lasix. She is being placed on telemetry observation for further management.  (08 Apr 2023 21:31)    Cardiology consulted for increasing SOB - HFpEF.    4/10/23: denies chest pain, still SOB, palpitations; Tele: Sinus Tachy 110s, diuretic changed to bumex  4/11/23: Offers no complaints of cp/sob/palpitations.  recurrence of atrial fibrillation  with rapid rate Tele: afib 100-115    MEDICATIONS Home Medications:  Albuterol (Eqv-ProAir HFA) 90 mcg/inh inhalation aerosol: 2 puff(s) inhaled every 6 hours as needed for  shortness of breath and/or wheezing (11 Apr 2023 09:31)  Anoro Ellipta 62.5 mcg-25 mcg/inh inhalation powder: 1 puff(s) inhaled once a day (11 Apr 2023 09:31)  Aspir 81 oral delayed release tablet: 1 tab(s) orally once a day (08 Apr 2023 18:33)  atorvastatin 10 mg oral tablet: 1 tab(s) orally once a day (at bedtime) (11 Apr 2023 09:31)  cholecalciferol 25 mcg (1000 intl units) oral tablet: 1 tab(s) orally once a day (11 Apr 2023 09:31)  digoxin 125 mcg (0.125 mg) oral tablet: 1 tab(s) orally once a day (11 Apr 2023 09:31)  Metoprolol Succinate ER 50 mg oral tablet, extended release: 1 tab(s) orally once a day (08 Apr 2023 18:33)  omeprazole 20 mg oral delayed release capsule: 1 tab(s) orally once a day (11 Apr 2023 09:31)  rivaroxaban 15 mg oral tablet: 1 tab(s) orally once a day (in the evening) (11 Apr 2023 09:31)  tamsulosin 0.4 mg oral capsule: 1 tab(s) orally once a day (at bedtime) (11 Apr 2023 09:31)       (STANDING):  aspirin enteric coated 81 milliGRAM(s) Oral daily  atorvastatin 10 milliGRAM(s) Oral at bedtime  budesonide 160 MICROgram(s)/formoterol 4.5 MICROgram(s) Inhaler 2 Puff(s) Inhalation two times a day  buMETAnide Injectable 1 milliGRAM(s) IV Push two times a day  cholecalciferol 1000 Unit(s) Oral daily  digoxin     Tablet 125 MICROGram(s) Oral daily  metoprolol succinate ER 50 milliGRAM(s) Oral daily  pantoprazole    Tablet 40 milliGRAM(s) Oral before breakfast  rivaroxaban 15 milliGRAM(s) Oral with dinner  tamsulosin 0.4 milliGRAM(s) Oral at bedtime  tiotropium 2.5 MICROgram(s) Inhaler 2 Puff(s) Inhalation daily    MEDICATIONS  (PRN):  acetaminophen     Tablet .. 650 milliGRAM(s) Oral every 6 hours PRN Temp greater or equal to 38C (100.4F), Mild Pain (1 - 3)  albuterol    90 MICROgram(s) HFA Inhaler 2 Puff(s) Inhalation every 6 hours PRN for shortness of breath and/or wheezing  aluminum hydroxide/magnesium hydroxide/simethicone Suspension 30 milliLiter(s) Oral every 4 hours PRN Dyspepsia  melatonin 3 milliGRAM(s) Oral at bedtime PRN Insomnia  ondansetron Injectable 4 milliGRAM(s) IV Push every 8 hours PRN Nausea and/or Vomiting      Vital Signs Last 24 Hrs  T(C): 37 (11 Apr 2023 08:26), Max: 37 (11 Apr 2023 08:26)  T(F): 98.6 (11 Apr 2023 08:26), Max: 98.6 (11 Apr 2023 08:26)  HR: 101 (11 Apr 2023 08:26) (90 - 104)  BP: 98/70 (11 Apr 2023 08:26) (98/70 - 105/67)  BP(mean): --  RR: 18 (11 Apr 2023 08:26) (18 - 18)  SpO2: 92% (11 Apr 2023 08:26) (92% - 100%)    Parameters below as of 11 Apr 2023 08:26  Patient On (Oxygen Delivery Method): nasal cannula      PHYSICAL EXAM:  Constitutional: NAD, awake and alert  HEENT:  EOMI,  Pupils round, No oral cyanosis.  Pulmonary: Non-labored, breath sounds are clear bilaterally  Cardiovascular: S1 and S2, no Murmurs, gallops or rubs  Gastrointestinal: Bowel Sounds present, soft, nontender.   Lymph: No edema  Neurological: Alert, no focal deficits  Skin: No rashes.      LABS:        04-09    138  |  100  |  31<H>  ----------------------------<  117<H>  4.3   |  37<H>  |  0.67    Ca    8.5      09 Apr 2023 07:23  Mg     1.4     04-08    TPro  7.1  /  Alb  3.1<L>  /  TBili  0.9  /  DBili  x   /  AST  17  /  ALT  16  /  AlkPhos  160<H>  04-09    - TroponinI hsT: <-20.70                        7.0    39.04 )-----------( 705      ( 09 Apr 2023 07:23 )             23.6                         7.1    37.78 )-----------( 726      ( 08 Apr 2023 16:39 )             23.4     09 Apr 2023 07:23    138    |  100    |  31     ----------------------------<  117    4.3     |  37     |  0.67   08 Apr 2023 16:39    136    |  100    |  29     ----------------------------<  144    4.0     |  31     |  0.73     Ca    8.5        09 Apr 2023 07:23  Ca    8.3        08 Apr 2023 16:39  Mg     1.4       08 Apr 2023 16:39    TPro  7.1    /  Alb  3.1    /  TBili  0.9    /  DBili  x      /  AST  17     /  ALT  16     /  AlkPhos  160    09 Apr 2023 07:23  TPro  7.3    /  Alb  3.2    /  TBili  0.9    /  DBili  x      /  AST  25     /  ALT  17     /  AlkPhos  173    08 Apr 2023 16:39    PT/INR - ( 08 Apr 2023 16:39 )   PT: 15.9 sec;   INR: 1.37 ratio         PTT - ( 08 Apr 2023 16:39 )  PTT:36.8 sec        Radiology: reviewed    < from: Xray Chest 1 View- PORTABLE-Urgent (04.08.23 @ 17:07) >    ACC: [id number] EXAM:  XR CHEST PORTABLE URGENT 1V   ORDERED BY: JUDY MEDINA     PROCEDURE DATE:  04/08/2023          INTERPRETATION:  CLINICAL STATEMENT: Chest Pain.    TECHNIQUE: AP view of the chest.      COMPARISON: 3/23/2023      Cardiomegaly.Pulmonary vascular congestion. Increased right base   effusion.    No significant osseous abnormality.    IMPRESSION:    Cardiomegaly. Pulmonary vascular congestion. Increased right base   effusion.    < end of copied text >    -----------------------------------------------------------------------------------------------------  < from: TTE Echo Complete w/o Contrast w/ Doppler (03.15.23 @ 11:22) >   Impression     Summary     The aortic valve is well visualized, appears mildly calcified. Valve   opening seems to be normal.   The ascending aorta appears to be mildly dilated.   The left atrium appears moderately dilated.   The left ventricle is normal in size, wall thickness, wall motion and   contractility.   Estimated left ventricular ejection fraction is 60 %.   IVC is dilated and not collapsing with inspiration.   Mild mitral annular calcification is present.   There is thickening of both mitral valve leaflets. The leaflet opening is   normal.   Mild (1+) mitral regurgitation is present.   A small pericardial effusion is present.   Pleural effusion - is present.   Mild to moderate pulmonic valvular regurgitation (1+) is present.   The right atrium appears mildly dilated.   Normal appearing right ventricle structure and function.   Moderate (2+) tricuspid valve regurgitation is present.     Signature     ----------------------------------------------------------------   Electronically signed by Caroline Perez MD(Interpreting   physician) on 03/15/2023 05:55 PM    < end of copied text >  -----------------------------------------------------------------------------------------------------

## 2023-04-11 NOTE — PROGRESS NOTE ADULT - PROBLEM SELECTOR PLAN 2
·  Problem: Afib.   ·  Recommendation: History of atrial fibrillation, small-medium pericardial effusion, myelofibrosis, recent pneumonia hospialized at [facility]  .  Patient converted to afib from ST last evening -115.  Continue PO digoxin and BB.  May not be able to titrate BB due to soft BP.  Continue xarelto, BB ·  Problem: Afib.   ·  Recommendation: History of atrial fibrillation, small-medium pericardial effusion, myelofibrosis, recent pneumonia hospitalized at [facility]  .  Patient converted to afib from ST last evening -115.  Continue PO digoxin and BB.  May not be able to titrate BB due to soft BP.  Continue xarelto, BB,  check digoxin level , consider increasing the dose to .25 mg  however would prefer to transfuse to maintain hemoglobin more than 8

## 2023-04-12 ENCOUNTER — TRANSCRIPTION ENCOUNTER (OUTPATIENT)
Age: 73
End: 2023-04-12

## 2023-04-12 LAB
AMYLASE P1 CFR SERPL: 113 U/L — SIGNIFICANT CHANGE UP (ref 25–115)
HCT VFR BLD CALC: 26.3 % — LOW (ref 34.5–45)
HGB BLD-MCNC: 7.8 G/DL — LOW (ref 11.5–15.5)
LIDOCAIN IGE QN: 365 U/L — SIGNIFICANT CHANGE UP (ref 73–393)
VIT B12 SERPL-MCNC: >2000 PG/ML — HIGH (ref 232–1245)

## 2023-04-12 PROCEDURE — 99233 SBSQ HOSP IP/OBS HIGH 50: CPT

## 2023-04-12 PROCEDURE — 99232 SBSQ HOSP IP/OBS MODERATE 35: CPT

## 2023-04-12 RX ADMIN — ATORVASTATIN CALCIUM 10 MILLIGRAM(S): 80 TABLET, FILM COATED ORAL at 22:17

## 2023-04-12 RX ADMIN — Medication 1000 UNIT(S): at 11:03

## 2023-04-12 RX ADMIN — Medication 50 MILLIGRAM(S): at 11:03

## 2023-04-12 RX ADMIN — PANTOPRAZOLE SODIUM 40 MILLIGRAM(S): 20 TABLET, DELAYED RELEASE ORAL at 06:06

## 2023-04-12 RX ADMIN — BUMETANIDE 1 MILLIGRAM(S): 0.25 INJECTION INTRAMUSCULAR; INTRAVENOUS at 13:06

## 2023-04-12 RX ADMIN — BUMETANIDE 1 MILLIGRAM(S): 0.25 INJECTION INTRAMUSCULAR; INTRAVENOUS at 06:06

## 2023-04-12 RX ADMIN — Medication 81 MILLIGRAM(S): at 11:03

## 2023-04-12 RX ADMIN — TIOTROPIUM BROMIDE 2 PUFF(S): 18 CAPSULE ORAL; RESPIRATORY (INHALATION) at 08:30

## 2023-04-12 RX ADMIN — RIVAROXABAN 15 MILLIGRAM(S): KIT at 17:07

## 2023-04-12 RX ADMIN — BUDESONIDE AND FORMOTEROL FUMARATE DIHYDRATE 2 PUFF(S): 160; 4.5 AEROSOL RESPIRATORY (INHALATION) at 08:32

## 2023-04-12 RX ADMIN — Medication 125 MICROGRAM(S): at 11:03

## 2023-04-12 RX ADMIN — BUDESONIDE AND FORMOTEROL FUMARATE DIHYDRATE 2 PUFF(S): 160; 4.5 AEROSOL RESPIRATORY (INHALATION) at 20:28

## 2023-04-12 NOTE — PROGRESS NOTE ADULT - PROBLEM SELECTOR PLAN 2
·  Problem: Afib.   ·  Recommendation: History of atrial fibrillation, small-medium pericardial effusion, myelofibrosis, recent pneumonia hospitalized at [facility]  .  Patient converted to afib from ST last evening -115.  Continue PO digoxin and BB.  May not be able to titrate BB due to soft BP.  Continue xarelto, BB,  check digoxin level , consider increasing the dose to .25 mg  however would prefer to transfuse to maintain hemoglobin more than 8 ·  Problem: Afib.   ·  Recommendation: History of atrial fibrillation, small-medium pericardial effusion, myelofibrosis, recent pneumonia hospitalized at [facility]  .  Patient converted to afib from ST last evening -115.  Continue PO digoxin and BB.  May not be able to titrate BB due to soft BP.  Continue xarelto, BB,  digoxin level - 1.96 - wnl, , consider increasing the dose to 0.25 mg  however would prefer to transfuse to maintain hemoglobin more than 8

## 2023-04-12 NOTE — PROGRESS NOTE ADULT - PROBLEM SELECTOR PLAN 1
·  Problem: Acute on chronic diastolic congestive heart failure.   ·  Recommendation: pt. recently discharged end of March, now with SOB, Elevated BNP 4436;  Echo in 3/23 - preserved LVEF 60%, moderate TR, pleural effusion  .  Weight improved since changing lasix to bumex.  Continue current dose 1 mg ivp Bid, cont. BB, daily weight, strict i&0s, fluid restriction 1.5L/day,  Repeat BNP pending ·  Problem: Acute on chronic diastolic congestive heart failure.   ·  Recommendation: pt. recently discharged end of March, now with SOB, Elevated BNP 4436;  Echo in 3/23 - preserved LVEF 60%, moderate TR, pleural effusion  .  Weight improved since changing lasix to bumex.  Continue current dose 1 mg ivp Bid, cont. BB, daily weight, strict i&0s, fluid restriction 1.5L/day,  Repeat BNP 5898 trended up

## 2023-04-12 NOTE — PROGRESS NOTE ADULT - SUBJECTIVE AND OBJECTIVE BOX
CHIEF COMPLAINT: Patient is a 72y old  Female who presents with a chief complaint of     HPI:  73 y/o F with PMH atrial fibrillation, small-medium pericardial effusion, myelofibrosis, recent pneumonia, renal stone, CHFpEF, mild COPD, on supplemental O2 (1L during the day and 2L at night since hospitalization at [facility]/[place] for PNA) presented with shortness of breath for the last 2 days. She also reports lower extremity swelling that started a while ago and occassional wheezing and palpitations today. No weight gain (pt was 121 upon d/c and was 119 today). She took 4 baby aspirin before coming to the ER today. Denies fevers, chills, chest pain, abdominal pain, N/V, diarrhea/constipation. Pt was previously on Jakafi which she was not responding to and was switched to Inrebic which was stopped d/t multiple side effects. She is due to start a new agent for myelofibrosis next week.     Prior admission:   - 3/29/23: mild acute on chronic CHFpEF     ER course: , SpO2 96% on 3L nasal cannula. Labs: WBC 37.78, Hb 7.1 (baseline ~8), , metamyelocytes 6%, myelocytes 5%, INR 1.37, glucose 144, albumin 3.2, alkaline phosphatase 173, BNP 4436. VBG: pH 7.41, CO2 50, HCO3 23. COVID and RVP negative.  EKG: Sinus tachycardia with  bpm, normal intervals, no ST segment changes, no T wave inversions (personally reviewed). CXR: cardiomegaly, increased vascular congestion b/l, right pleural effusion (personally reviewed).     Pt was given 80 mg IVP lasix. She is being placed on telemetry observation for further management.  (08 Apr 2023 21:31)    Cardiology consulted for increasing SOB - HFpEF.    4/10/23: denies chest pain, still SOB, palpitations; Tele: Sinus Tachy 110s, diuretic changed to bumex  4/11/23: Offers no complaints of cp/sob/palpitations.  recurrence of atrial fibrillation  with rapid rate Tele: afib 100-115    MEDICATIONS Home Medications:  Albuterol (Eqv-ProAir HFA) 90 mcg/inh inhalation aerosol: 2 puff(s) inhaled every 6 hours as needed for  shortness of breath and/or wheezing (11 Apr 2023 09:31)  Anoro Ellipta 62.5 mcg-25 mcg/inh inhalation powder: 1 puff(s) inhaled once a day (11 Apr 2023 09:31)  Aspir 81 oral delayed release tablet: 1 tab(s) orally once a day (08 Apr 2023 18:33)  atorvastatin 10 mg oral tablet: 1 tab(s) orally once a day (at bedtime) (11 Apr 2023 09:31)  cholecalciferol 25 mcg (1000 intl units) oral tablet: 1 tab(s) orally once a day (11 Apr 2023 09:31)  digoxin 125 mcg (0.125 mg) oral tablet: 1 tab(s) orally once a day (11 Apr 2023 09:31)  Metoprolol Succinate ER 50 mg oral tablet, extended release: 1 tab(s) orally once a day (08 Apr 2023 18:33)  omeprazole 20 mg oral delayed release capsule: 1 tab(s) orally once a day (11 Apr 2023 09:31)  rivaroxaban 15 mg oral tablet: 1 tab(s) orally once a day (in the evening) (11 Apr 2023 09:31)  tamsulosin 0.4 mg oral capsule: 1 tab(s) orally once a day (at bedtime) (11 Apr 2023 09:31)       (STANDING):  aspirin enteric coated 81 milliGRAM(s) Oral daily  atorvastatin 10 milliGRAM(s) Oral at bedtime  budesonide 160 MICROgram(s)/formoterol 4.5 MICROgram(s) Inhaler 2 Puff(s) Inhalation two times a day  buMETAnide Injectable 1 milliGRAM(s) IV Push two times a day  cholecalciferol 1000 Unit(s) Oral daily  digoxin     Tablet 125 MICROGram(s) Oral daily  metoprolol succinate ER 50 milliGRAM(s) Oral daily  pantoprazole    Tablet 40 milliGRAM(s) Oral before breakfast  rivaroxaban 15 milliGRAM(s) Oral with dinner  tamsulosin 0.4 milliGRAM(s) Oral at bedtime  tiotropium 2.5 MICROgram(s) Inhaler 2 Puff(s) Inhalation daily    MEDICATIONS  (PRN):  acetaminophen     Tablet .. 650 milliGRAM(s) Oral every 6 hours PRN Temp greater or equal to 38C (100.4F), Mild Pain (1 - 3)  albuterol    90 MICROgram(s) HFA Inhaler 2 Puff(s) Inhalation every 6 hours PRN for shortness of breath and/or wheezing  aluminum hydroxide/magnesium hydroxide/simethicone Suspension 30 milliLiter(s) Oral every 4 hours PRN Dyspepsia  melatonin 3 milliGRAM(s) Oral at bedtime PRN Insomnia  ondansetron Injectable 4 milliGRAM(s) IV Push every 8 hours PRN Nausea and/or Vomiting      Vital Signs Last 24 Hrs  T(C): 37 (11 Apr 2023 08:26), Max: 37 (11 Apr 2023 08:26)  T(F): 98.6 (11 Apr 2023 08:26), Max: 98.6 (11 Apr 2023 08:26)  HR: 101 (11 Apr 2023 08:26) (90 - 104)  BP: 98/70 (11 Apr 2023 08:26) (98/70 - 105/67)  BP(mean): --  RR: 18 (11 Apr 2023 08:26) (18 - 18)  SpO2: 92% (11 Apr 2023 08:26) (92% - 100%)    Parameters below as of 11 Apr 2023 08:26  Patient On (Oxygen Delivery Method): nasal cannula      PHYSICAL EXAM:  Constitutional: NAD, awake and alert  HEENT:  EOMI,  Pupils round, No oral cyanosis.  Pulmonary: Non-labored, breath sounds are clear bilaterally  Cardiovascular: S1 and S2, no Murmurs, gallops or rubs  Gastrointestinal: Bowel Sounds present, soft, nontender.   Lymph: No edema  Neurological: Alert, no focal deficits  Skin: No rashes.      LABS:        04-09    138  |  100  |  31<H>  ----------------------------<  117<H>  4.3   |  37<H>  |  0.67    Ca    8.5      09 Apr 2023 07:23  Mg     1.4     04-08    TPro  7.1  /  Alb  3.1<L>  /  TBili  0.9  /  DBili  x   /  AST  17  /  ALT  16  /  AlkPhos  160<H>  04-09    - TroponinI hsT: <-20.70                        7.0    39.04 )-----------( 705      ( 09 Apr 2023 07:23 )             23.6                         7.1    37.78 )-----------( 726      ( 08 Apr 2023 16:39 )             23.4     09 Apr 2023 07:23    138    |  100    |  31     ----------------------------<  117    4.3     |  37     |  0.67   08 Apr 2023 16:39    136    |  100    |  29     ----------------------------<  144    4.0     |  31     |  0.73     Ca    8.5        09 Apr 2023 07:23  Ca    8.3        08 Apr 2023 16:39  Mg     1.4       08 Apr 2023 16:39    TPro  7.1    /  Alb  3.1    /  TBili  0.9    /  DBili  x      /  AST  17     /  ALT  16     /  AlkPhos  160    09 Apr 2023 07:23  TPro  7.3    /  Alb  3.2    /  TBili  0.9    /  DBili  x      /  AST  25     /  ALT  17     /  AlkPhos  173    08 Apr 2023 16:39    PT/INR - ( 08 Apr 2023 16:39 )   PT: 15.9 sec;   INR: 1.37 ratio         PTT - ( 08 Apr 2023 16:39 )  PTT:36.8 sec        Radiology: reviewed    < from: Xray Chest 1 View- PORTABLE-Urgent (04.08.23 @ 17:07) >    ACC: [id number] EXAM:  XR CHEST PORTABLE URGENT 1V   ORDERED BY: JUDY MEDINA     PROCEDURE DATE:  04/08/2023          INTERPRETATION:  CLINICAL STATEMENT: Chest Pain.    TECHNIQUE: AP view of the chest.      COMPARISON: 3/23/2023      Cardiomegaly.Pulmonary vascular congestion. Increased right base   effusion.    No significant osseous abnormality.    IMPRESSION:    Cardiomegaly. Pulmonary vascular congestion. Increased right base   effusion.    < end of copied text >    -----------------------------------------------------------------------------------------------------  < from: TTE Echo Complete w/o Contrast w/ Doppler (03.15.23 @ 11:22) >   Impression     Summary     The aortic valve is well visualized, appears mildly calcified. Valve   opening seems to be normal.   The ascending aorta appears to be mildly dilated.   The left atrium appears moderately dilated.   The left ventricle is normal in size, wall thickness, wall motion and   contractility.   Estimated left ventricular ejection fraction is 60 %.   IVC is dilated and not collapsing with inspiration.   Mild mitral annular calcification is present.   There is thickening of both mitral valve leaflets. The leaflet opening is   normal.   Mild (1+) mitral regurgitation is present.   A small pericardial effusion is present.   Pleural effusion - is present.   Mild to moderate pulmonic valvular regurgitation (1+) is present.   The right atrium appears mildly dilated.   Normal appearing right ventricle structure and function.   Moderate (2+) tricuspid valve regurgitation is present.     Signature     ----------------------------------------------------------------   Electronically signed by Caroline Perez MD(Interpreting   physician) on 03/15/2023 05:55 PM    < end of copied text >  -----------------------------------------------------------------------------------------------------         CHIEF COMPLAINT: Patient is a 72y old  Female who presents with a chief complaint of     HPI:  73 y/o F with PMH atrial fibrillation, small-medium pericardial effusion, myelofibrosis, recent pneumonia, renal stone, CHFpEF, mild COPD, on supplemental O2 (1L during the day and 2L at night since hospitalization at [facility]/[place] for PNA) presented with shortness of breath for the last 2 days. She also reports lower extremity swelling that started a while ago and occassional wheezing and palpitations today. No weight gain (pt was 121 upon d/c and was 119 today). She took 4 baby aspirin before coming to the ER today. Denies fevers, chills, chest pain, abdominal pain, N/V, diarrhea/constipation. Pt was previously on Jakafi which she was not responding to and was switched to Inrebic which was stopped d/t multiple side effects. She is due to start a new agent for myelofibrosis next week.     Prior admission:   - 3/29/23: mild acute on chronic CHFpEF     ER course: , SpO2 96% on 3L nasal cannula. Labs: WBC 37.78, Hb 7.1 (baseline ~8), , metamyelocytes 6%, myelocytes 5%, INR 1.37, glucose 144, albumin 3.2, alkaline phosphatase 173, BNP 4436. VBG: pH 7.41, CO2 50, HCO3 23. COVID and RVP negative.  EKG: Sinus tachycardia with  bpm, normal intervals, no ST segment changes, no T wave inversions (personally reviewed). CXR: cardiomegaly, increased vascular congestion b/l, right pleural effusion (personally reviewed).     Pt was given 80 mg IVP lasix. She is being placed on telemetry observation for further management.  (08 Apr 2023 21:31)    Cardiology consulted for increasing SOB - HFpEF.    4/10/23: denies chest pain, still SOB, palpitations; Tele: Sinus Tachy 110s, diuretic changed to bumex  4/11/23: Offers no complaints of cp/sob/palpitations.  recurrence of atrial fibrillation  with rapid rate Tele: afib 100-115  4/12/23: no cardiac complaints; Tele; Afib 80s    MEDICATIONS Home Medications:  Albuterol (Eqv-ProAir HFA) 90 mcg/inh inhalation aerosol: 2 puff(s) inhaled every 6 hours as needed for  shortness of breath and/or wheezing (11 Apr 2023 09:31)  Anoro Ellipta 62.5 mcg-25 mcg/inh inhalation powder: 1 puff(s) inhaled once a day (11 Apr 2023 09:31)  Aspir 81 oral delayed release tablet: 1 tab(s) orally once a day (08 Apr 2023 18:33)  atorvastatin 10 mg oral tablet: 1 tab(s) orally once a day (at bedtime) (11 Apr 2023 09:31)  cholecalciferol 25 mcg (1000 intl units) oral tablet: 1 tab(s) orally once a day (11 Apr 2023 09:31)  digoxin 125 mcg (0.125 mg) oral tablet: 1 tab(s) orally once a day (11 Apr 2023 09:31)  Metoprolol Succinate ER 50 mg oral tablet, extended release: 1 tab(s) orally once a day (08 Apr 2023 18:33)  omeprazole 20 mg oral delayed release capsule: 1 tab(s) orally once a day (11 Apr 2023 09:31)  rivaroxaban 15 mg oral tablet: 1 tab(s) orally once a day (in the evening) (11 Apr 2023 09:31)  tamsulosin 0.4 mg oral capsule: 1 tab(s) orally once a day (at bedtime) (11 Apr 2023 09:31)    MEDICATIONS  (STANDING):  aspirin enteric coated 81 milliGRAM(s) Oral daily  atorvastatin 10 milliGRAM(s) Oral at bedtime  budesonide 160 MICROgram(s)/formoterol 4.5 MICROgram(s) Inhaler 2 Puff(s) Inhalation two times a day  buMETAnide Injectable 1 milliGRAM(s) IV Push two times a day  cholecalciferol 1000 Unit(s) Oral daily  digoxin     Tablet 125 MICROGram(s) Oral daily  metoprolol succinate ER 50 milliGRAM(s) Oral daily  pantoprazole    Tablet 40 milliGRAM(s) Oral before breakfast  rivaroxaban 15 milliGRAM(s) Oral with dinner  tamsulosin 0.4 milliGRAM(s) Oral at bedtime  tiotropium 2.5 MICROgram(s) Inhaler 2 Puff(s) Inhalation daily      MEDICATIONS  (PRN):  acetaminophen     Tablet .. 650 milliGRAM(s) Oral every 6 hours PRN Temp greater or equal to 38C (100.4F), Mild Pain (1 - 3)  albuterol    90 MICROgram(s) HFA Inhaler 2 Puff(s) Inhalation every 6 hours PRN for shortness of breath and/or wheezing  aluminum hydroxide/magnesium hydroxide/simethicone Suspension 30 milliLiter(s) Oral every 4 hours PRN Dyspepsia  melatonin 3 milliGRAM(s) Oral at bedtime PRN Insomnia  ondansetron Injectable 4 milliGRAM(s) IV Push every 8 hours PRN Nausea and/or Vomiting    Vital Signs Last 24 Hrs  T(C): 36.6 (12 Apr 2023 08:24), Max: 36.9 (12 Apr 2023 05:58)  T(F): 97.9 (12 Apr 2023 08:24), Max: 98.4 (12 Apr 2023 05:58)  HR: 95 (12 Apr 2023 08:24) (91 - 106)  BP: 101/59 (12 Apr 2023 08:24) (101/59 - 109/54)  BP(mean): 78 (12 Apr 2023 05:58) (78 - 78)  RR: 18 (12 Apr 2023 08:24) (18 - 18)  SpO2: 93% (12 Apr 2023 08:24) (93% - 98%)    Parameters below as of 12 Apr 2023 08:24  Patient On (Oxygen Delivery Method): nasal cannula      PHYSICAL EXAM:  Constitutional: NAD, awake and alert  HEENT:  EOMI,  Pupils round, No oral cyanosis.  Pulmonary: Non-labored, breath sounds are clear bilaterally  Cardiovascular: S1 and S2, no Murmurs, gallops or rubs  Gastrointestinal: Bowel Sounds present, soft, nontender.   Lymph: No edema  Neurological: Alert, no focal deficits  Skin: No rashes.      LABS:                          7.8    x     )-----------( x        ( 12 Apr 2023 07:43 )             26.3     - TroponinI hsT: <-20.70    04-09    138  |  100  |  31<H>  ----------------------------<  117<H>  4.3   |  37<H>  |  0.67    Ca    8.5      09 Apr 2023 07:23  Mg     1.4     04-08    TPro  7.1  /  Alb  3.1<L>  /  TBili  0.9  /  DBili  x   /  AST  17  /  ALT  16  /  AlkPhos  160<H>  04-09    - TroponinI hsT: <-20.70                        7.0    39.04 )-----------( 705      ( 09 Apr 2023 07:23 )             23.6                         7.1    37.78 )-----------( 726      ( 08 Apr 2023 16:39 )             23.4     09 Apr 2023 07:23    138    |  100    |  31     ----------------------------<  117    4.3     |  37     |  0.67   08 Apr 2023 16:39    136    |  100    |  29     ----------------------------<  144    4.0     |  31     |  0.73     Ca    8.5        09 Apr 2023 07:23  Ca    8.3        08 Apr 2023 16:39  Mg     1.4       08 Apr 2023 16:39    TPro  7.1    /  Alb  3.1    /  TBili  0.9    /  DBili  x      /  AST  17     /  ALT  16     /  AlkPhos  160    09 Apr 2023 07:23  TPro  7.3    /  Alb  3.2    /  TBili  0.9    /  DBili  x      /  AST  25     /  ALT  17     /  AlkPhos  173    08 Apr 2023 16:39    PT/INR - ( 08 Apr 2023 16:39 )   PT: 15.9 sec;   INR: 1.37 ratio         PTT - ( 08 Apr 2023 16:39 )  PTT:36.8 sec        Radiology: reviewed    < from: Xray Chest 1 View- PORTABLE-Urgent (04.08.23 @ 17:07) >    ACC: [id number] EXAM:  XR CHEST PORTABLE URGENT 1V   ORDERED BY: JUDY MEDINA     PROCEDURE DATE:  04/08/2023          INTERPRETATION:  CLINICAL STATEMENT: Chest Pain.    TECHNIQUE: AP view of the chest.      COMPARISON: 3/23/2023      Cardiomegaly.Pulmonary vascular congestion. Increased right base   effusion.    No significant osseous abnormality.    IMPRESSION:    Cardiomegaly. Pulmonary vascular congestion. Increased right base   effusion.    < end of copied text >    -----------------------------------------------------------------------------------------------------  < from: TTE Echo Complete w/o Contrast w/ Doppler (03.15.23 @ 11:22) >   Impression     Summary     The aortic valve is well visualized, appears mildly calcified. Valve   opening seems to be normal.   The ascending aorta appears to be mildly dilated.   The left atrium appears moderately dilated.   The left ventricle is normal in size, wall thickness, wall motion and   contractility.   Estimated left ventricular ejection fraction is 60 %.   IVC is dilated and not collapsing with inspiration.   Mild mitral annular calcification is present.   There is thickening of both mitral valve leaflets. The leaflet opening is   normal.   Mild (1+) mitral regurgitation is present.   A small pericardial effusion is present.   Pleural effusion - is present.   Mild to moderate pulmonic valvular regurgitation (1+) is present.   The right atrium appears mildly dilated.   Normal appearing right ventricle structure and function.   Moderate (2+) tricuspid valve regurgitation is present.     Signature     ----------------------------------------------------------------   Electronically signed by Caroline Perez MD(Interpreting   physician) on 03/15/2023 05:55 PM    < end of copied text >  -----------------------------------------------------------------------------------------------------

## 2023-04-12 NOTE — DISCHARGE NOTE NURSING/CASE MANAGEMENT/SOCIAL WORK - NSDCFUADDAPPT_GEN_ALL_CORE_FT
Follow-up appointment with Dr. Leon on Friday, April 14, 2023 At 8:00am  [address] [place] [address]

## 2023-04-12 NOTE — PROGRESS NOTE ADULT - ASSESSMENT
73 y/o F presented with shortness of breath     1. Acute on chronic hypoxic/hypercapneic respiratory failure secondary to acute CHF on CHFpEF   - Telemetry observation   - c/w supplemental O2 to keep SpO2 88-92%   - SpO2 96% on 3L nasal cannula (pt on 1L during the day and 2L at night at home)   - No expiratory wheezing to suggest COPD exacerbation; however, pt with CO2 retention on VBG, c/w inhalers   - ECHO (3/15/23): LVEF 60%, mild MR, small pericardial effusion, pleural effusion present, mild to moderate pulmonic regurgitation, moderate 2+ TR   - BNP 4436, pt is fluid overloaded on exam   - Pt refusing CT chest   - s/p 80 mg IVP lasix in the ER   - c/w 40 mg IVP lasix daily   - c/w home medications: beta blockers    - Ordered procalcitonin to r/o superimposed bacterial PNA   - Strict I+Os, daily weights   - 2L H20 restriction, 2g sodium restriction      - Keep K > 4 and Mg > 2    - Cardiology consult - Dr. Leon     2. Normocytic anemia likely anemia of chronic disease in the setting of myelofibrosis   - Hb 7.1 (baseline ~8), monitor closely   - No hx of bloody stools or tarry stools   - Ordered fecal occult, type and screen    - Blood consent in chart if H+H drops   - Transfuse for Hb < 7     3. Hyperglycemia   - Glucose 144, ordered HbA1c     4. Hypoalbuminemia   - Albumin 3.2   - Nutrition consult     5. History of atrial fibrillation, small-medium pericardial effusion, myelofibrosis, recent pneumonia, renal stone, CHFpEF, mild COPD, on supplemental O2 (1L during the day and 2L at night since hospitalization at [facility] for PNA)  - c/w home medications; verified with pt at the bedside   - , will give 1 dose of 2.5 mg lopressor -> monitor on telemetry   - WBC 37.78, , metamyelocytes 6%, myelocytes 5%, alkaline phosphatase 173 -> secondary to meylofibrosis, trend -> pt to start new treatment outpt next week     DVT ppx: Xarelto   Code status: Full code (Pt agrees to chest compressions and intubation if required).  Emergency contact: Stacy Moncada ( [phone])   
71 y/o F presented with shortness of breath     1. Acute on chronic hypoxic/hypercapneic respiratory failure secondary to acute CHF on CHFpEF   admit to tele  - c/w supplemental O2 to keep SpO2 88-92%   - SpO2 96% on 3L nasal cannula (pt on 1L during the day and 2L at night at home)   - No expiratory wheezing to suggest COPD exacerbation; however, pt with CO2 retention on VBG, c/w inhalers   - ECHO (3/15/23): LVEF 60%, mild MR, small pericardial effusion, pleural effusion present, mild to moderate pulmonic regurgitation, moderate 2+ TR   - BNP 4436, pt is fluid overloaded on exam   - c/w home medications: beta blockers    - Strict I+Os, daily weights   - 2L H20 restriction, 2g sodium restriction      - Keep K > 4 and Mg > 2    - Cardiology consult - Dr. Leon   started Bumex 4/10  monitor closely  cont iv bumex    2. Normocytic anemia likely anemia of chronic disease in the setting of myelofibrosis   - Hb 7.1 (baseline ~8), monitor closely   - No hx of bloody stools or tarry stools   FOBT neg  Hb dropped to 6.0  s/p 1 unit PRBC on  4/11  Hb 7.8    3. Hyperglycemia   - Glucose 144, ordered HbA1c     4. Hypoalbuminemia   - Albumin 3.2   - Nutrition consult     5. History of atrial fibrillation, small-medium pericardial effusion, myelofibrosis, recent pneumonia, renal stone, CHFpEF, mild COPD, on supplemental O2 (1L during the day and 2L at night since hospitalization at [facility] for PNA)  - c/w home medications; verified with pt at the bedside   - , will give 1 dose of 2.5 mg lopressor -> monitor on telemetry   - WBC 37.78, , metamyelocytes 6%, myelocytes 5%, alkaline phosphatase 173 -> secondary to meylofibrosis, trend -> pt to start new treatment outpt next week     DVT ppx: Xarelto   Code status: Full code (Pt agrees to chest compressions and intubation if required).  Emergency contact: Stacy Moncada ( [phone])     poc discussed with pt, tea, her , ben team    DISPO: cont 1 more day of iv bumex per cardio  no need for vit b12 shot at this time; Vit B 12 level >2000  lipase, amylase normal    likely d/c on 4/13 if fine with cardio
73 y/o F presented with shortness of breath     1. Acute on chronic hypoxic/hypercapneic respiratory failure secondary to acute CHF on CHFpEF   - Telemetry observation   - c/w supplemental O2 to keep SpO2 88-92%   - SpO2 96% on 3L nasal cannula (pt on 1L during the day and 2L at night at home)   - No expiratory wheezing to suggest COPD exacerbation; however, pt with CO2 retention on VBG, c/w inhalers   - ECHO (3/15/23): LVEF 60%, mild MR, small pericardial effusion, pleural effusion present, mild to moderate pulmonic regurgitation, moderate 2+ TR   - BNP 4436, pt is fluid overloaded on exam   - Pt refusing CT chest   - s/p 80 mg IVP lasix in the ER   - c/w home medications: beta blockers    - Ordered procalcitonin to r/o superimposed bacterial PNA   - Strict I+Os, daily weights   - 2L H20 restriction, 2g sodium restriction      - Keep K > 4 and Mg > 2    - Cardiology consult - Dr. Leon   started Bumex 4/10  monitor closely    2. Normocytic anemia likely anemia of chronic disease in the setting of myelofibrosis   - Hb 7.1 (baseline ~8), monitor closely   - No hx of bloody stools or tarry stools   - Ordered fecal occult, type and screen    - Blood consent in chart if H+H drops   - Transfuse for Hb < 7   H/H in am    3. Hyperglycemia   - Glucose 144, ordered HbA1c     4. Hypoalbuminemia   - Albumin 3.2   - Nutrition consult     5. History of atrial fibrillation, small-medium pericardial effusion, myelofibrosis, recent pneumonia, renal stone, CHFpEF, mild COPD, on supplemental O2 (1L during the day and 2L at night since hospitalization at [facility] for PNA)  - c/w home medications; verified with pt at the bedside   - , will give 1 dose of 2.5 mg lopressor -> monitor on telemetry   - WBC 37.78, , metamyelocytes 6%, myelocytes 5%, alkaline phosphatase 173 -> secondary to meylofibrosis, trend -> pt to start new treatment outpt next week     DVT ppx: Xarelto   Code status: Full code (Pt agrees to chest compressions and intubation if required).  Emergency contact: Stacy Moncada ( [phone])   
73 y/o F presented with shortness of breath     1. Acute on chronic hypoxic/hypercapneic respiratory failure secondary to acute CHF on CHFpEF   - Telemetry observation   - c/w supplemental O2 to keep SpO2 88-92%   - SpO2 96% on 3L nasal cannula (pt on 1L during the day and 2L at night at home)   - No expiratory wheezing to suggest COPD exacerbation; however, pt with CO2 retention on VBG, c/w inhalers   - ECHO (3/15/23): LVEF 60%, mild MR, small pericardial effusion, pleural effusion present, mild to moderate pulmonic regurgitation, moderate 2+ TR   - BNP 4436, pt is fluid overloaded on exam   - Pt refusing CT chest   - s/p 80 mg IVP lasix in the ER   - c/w home medications: beta blockers    - Ordered procalcitonin to r/o superimposed bacterial PNA   - Strict I+Os, daily weights   - 2L H20 restriction, 2g sodium restriction      - Keep K > 4 and Mg > 2    - Cardiology consult - Dr. Leon   started Bumex 4/10  monitor closely    2. Normocytic anemia likely anemia of chronic disease in the setting of myelofibrosis   - Hb 7.1 (baseline ~8), monitor closely   - No hx of bloody stools or tarry stools   - Ordered fecal occult, type and screen    - Blood consent in chart if H+H drops   - Transfuse for Hb < 7   H/H in am    3. Hyperglycemia   - Glucose 144, ordered HbA1c     4. Hypoalbuminemia   - Albumin 3.2   - Nutrition consult     5. History of atrial fibrillation, small-medium pericardial effusion, myelofibrosis, recent pneumonia, renal stone, CHFpEF, mild COPD, on supplemental O2 (1L during the day and 2L at night since hospitalization at [facility] for PNA)  - c/w home medications; verified with pt at the bedside   - , will give 1 dose of 2.5 mg lopressor -> monitor on telemetry   - WBC 37.78, , metamyelocytes 6%, myelocytes 5%, alkaline phosphatase 173 -> secondary to meylofibrosis, trend -> pt to start new treatment outpt next week     DVT ppx: Xarelto   Code status: Full code (Pt agrees to chest compressions and intubation if required).  Emergency contact: Stacy Moncada ( [phone])     poc discussed with pt, donovan, her , Dr. Palla.

## 2023-04-12 NOTE — DISCHARGE NOTE NURSING/CASE MANAGEMENT/SOCIAL WORK - NSDCPEFALRISK_GEN_ALL_CORE
For information on Fall & Injury Prevention, visit: [email or web address] OR  [email or web address] OR  https://www.cdc.gov/steadi/patient.html

## 2023-04-12 NOTE — PROGRESS NOTE ADULT - SUBJECTIVE AND OBJECTIVE BOX
4/9: feeling better  H: 7.1 stable     4/10: more sob  on 3 L O2 nc  lasix d/katia; iv Bumex started    4/11: went into A fib + RVR  Hb dropped to 6; 1 unit PRBC  Dig level normal.  BP borderline  FOBT neg    4/12: no new complaints  cont iv bumex as per cardio x 1 more day    Vital Signs Last 24 Hrs  T(C): 36.7 (12 Apr 2023 17:11), Max: 36.9 (12 Apr 2023 05:58)  T(F): 98.1 (12 Apr 2023 17:11), Max: 98.4 (12 Apr 2023 05:58)  HR: 79 (12 Apr 2023 17:11) (79 - 104)  BP: 105/69 (12 Apr 2023 17:11) (101/59 - 107/67)  BP(mean): 78 (12 Apr 2023 05:58) (78 - 78)  RR: 18 (12 Apr 2023 17:11) (18 - 18)  SpO2: 95% (12 Apr 2023 17:11) (93% - 98%)    Parameters below as of 12 Apr 2023 17:11  Patient On (Oxygen Delivery Method): room air  O2 Flow (L/min): 2            General: Awake and alert, cooperative with exam. No acute distress.   Skin: Warm, dry, and pink.   Eyes: Pupils equal and reactive to light. Extraocular eye movements intact. No conjunctival injection, discharge, or scleral icterus.   HEENT: Atraumatic, normocephalic. Moist mucus membranes.  Cardiology: Normal S1, S2. No murmurs, rubs, or gallops. Irregularly irregular.   Respiratory: Crackles at the bases b/l. No wheezes, rales, or rhonchi. Normal chest expansion.   Gastrointestinal: Positive bowel sounds. Soft, non-tender, non-distended. No guarding, rigidity, or rebound tenderness. No hepatosplenomegaly.   Musculoskeletal: 5/5 motor strength in all extremities. Normal range of motion.   Extremities: 1+ pitting edema bilaterally. Dorsalis pedis pulses 2+ bilaterally.   Neurological: A+Ox3 (person, place, and time). Cranial nerves 2-12 intact. Normal speech. No facial droop. No focal neurological deficits.  Psychiatric: Normal affect. Normal mood             All Labs/EKG/Radiology/Meds reviewed    Lab Results:  CBC  CBC Full  -  ( 12 Apr 2023 07:43 )  WBC Count : x  RBC Count : x  Hemoglobin : 7.8 g/dL  Hematocrit : 26.3 %  Platelet Count - Automated : x  Mean Cell Volume : x  Mean Cell Hemoglobin : x  Mean Cell Hemoglobin Concentration : x  Auto Neutrophil # : x  Auto Lymphocyte # : x  Auto Monocyte # : x  Auto Eosinophil # : x  Auto Basophil # : x  Auto Neutrophil % : x  Auto Lymphocyte % : x  Auto Monocyte % : x  Auto Eosinophil % : x  Auto Basophil % : x    .		Differential:	[] Automated		[] Manual  Chemistry                MICROBIOLOGY/CULTURES:                     6.0    32.54 )-----------( 532      ( 11 Apr 2023 10:56 )             20.5                              7.0    39.04 )-----------( 705      ( 09 Apr 2023 07:23 )             23.6     04-09    138  |  100  |  31<H>  ----------------------------<  117<H>  4.3   |  37<H>  |  0.67    Ca    8.5      09 Apr 2023 07:23  Mg     1.4     04-08    TPro  7.1  /  Alb  3.1<L>  /  TBili  0.9  /  DBili  x   /  AST  17  /  ALT  16  /  AlkPhos  160<H>  04-09    - TroponinI hsT: <-20.70    Radiology:    < from: Xray Chest 1 View- PORTABLE-Urgent (04.08.23 @ 17:07) >    ACC: [id number] EXAM:  XR CHEST PORTABLE URGENT 1V   ORDERED BY: JUDY MEDINA     PROCEDURE DATE:  04/08/2023          INTERPRETATION:  CLINICAL STATEMENT: Chest Pain.    TECHNIQUE: AP view of the chest.      COMPARISON: 3/23/2023      Cardiomegaly.Pulmonary vascular congestion. Increased right base   effusion.    No significant osseous abnormality.    IMPRESSION:    Cardiomegaly. Pulmonary vascular congestion. Increased right base   effusion.    --- End of Report ---            STEVE VAUGHN MD; Attending Radiologist  This document has been electronically signed. Apr 9 2023 11:46AM    < end of copied text >    MEDICATIONS  (STANDING):  aspirin enteric coated 81 milliGRAM(s) Oral daily  atorvastatin 10 milliGRAM(s) Oral at bedtime  budesonide 160 MICROgram(s)/formoterol 4.5 MICROgram(s) Inhaler 2 Puff(s) Inhalation two times a day  buMETAnide Injectable 1 milliGRAM(s) IV Push two times a day  cholecalciferol 1000 Unit(s) Oral daily  digoxin     Tablet 125 MICROGram(s) Oral daily  metoprolol succinate ER 50 milliGRAM(s) Oral daily  pantoprazole    Tablet 40 milliGRAM(s) Oral before breakfast  rivaroxaban 15 milliGRAM(s) Oral with dinner  tamsulosin 0.4 milliGRAM(s) Oral at bedtime  tiotropium 2.5 MICROgram(s) Inhaler 2 Puff(s) Inhalation daily    MEDICATIONS  (PRN):  acetaminophen     Tablet .. 650 milliGRAM(s) Oral every 6 hours PRN Temp greater or equal to 38C (100.4F), Mild Pain (1 - 3)  albuterol    90 MICROgram(s) HFA Inhaler 2 Puff(s) Inhalation every 6 hours PRN for shortness of breath and/or wheezing  aluminum hydroxide/magnesium hydroxide/simethicone Suspension 30 milliLiter(s) Oral every 4 hours PRN Dyspepsia  melatonin 3 milliGRAM(s) Oral at bedtime PRN Insomnia  ondansetron Injectable 4 milliGRAM(s) IV Push every 8 hours PRN Nausea and/or Vomiting

## 2023-04-13 ENCOUNTER — TRANSCRIPTION ENCOUNTER (OUTPATIENT)
Age: 73
End: 2023-04-13

## 2023-04-13 VITALS
HEART RATE: 86 BPM | SYSTOLIC BLOOD PRESSURE: 107 MMHG | TEMPERATURE: 98 F | RESPIRATION RATE: 20 BRPM | DIASTOLIC BLOOD PRESSURE: 54 MMHG | OXYGEN SATURATION: 99 %

## 2023-04-13 LAB
HCT VFR BLD CALC: 26 % — LOW (ref 34.5–45)
HGB BLD-MCNC: 7.7 G/DL — LOW (ref 11.5–15.5)

## 2023-04-13 PROCEDURE — 99232 SBSQ HOSP IP/OBS MODERATE 35: CPT

## 2023-04-13 PROCEDURE — 99239 HOSP IP/OBS DSCHRG MGMT >30: CPT

## 2023-04-13 RX ORDER — BUMETANIDE 0.25 MG/ML
1 INJECTION INTRAMUSCULAR; INTRAVENOUS
Qty: 60 | Refills: 0
Start: 2023-04-13 | End: 2023-05-12

## 2023-04-13 RX ADMIN — Medication 1000 UNIT(S): at 09:55

## 2023-04-13 RX ADMIN — Medication 81 MILLIGRAM(S): at 09:55

## 2023-04-13 RX ADMIN — BUMETANIDE 1 MILLIGRAM(S): 0.25 INJECTION INTRAMUSCULAR; INTRAVENOUS at 06:05

## 2023-04-13 RX ADMIN — PANTOPRAZOLE SODIUM 40 MILLIGRAM(S): 20 TABLET, DELAYED RELEASE ORAL at 06:05

## 2023-04-13 RX ADMIN — Medication 125 MICROGRAM(S): at 09:55

## 2023-04-13 RX ADMIN — TIOTROPIUM BROMIDE 2 PUFF(S): 18 CAPSULE ORAL; RESPIRATORY (INHALATION) at 09:48

## 2023-04-13 RX ADMIN — BUDESONIDE AND FORMOTEROL FUMARATE DIHYDRATE 2 PUFF(S): 160; 4.5 AEROSOL RESPIRATORY (INHALATION) at 09:48

## 2023-04-13 NOTE — DISCHARGE NOTE PROVIDER - DETAILS OF MALNUTRITION DIAGNOSIS/DIAGNOSES
This patient has been assessed with a concern for Malnutrition and was treated during this hospitalization for the following Nutrition diagnosis/diagnoses:     -  04/10/2023: Severe protein-calorie malnutrition

## 2023-04-13 NOTE — PROGRESS NOTE ADULT - PROVIDER SPECIALTY LIST ADULT
Cardiology
Hospitalist
Cardiology

## 2023-04-13 NOTE — PROGRESS NOTE ADULT - PROBLEM SELECTOR PLAN 1
·  Problem: Acute on chronic diastolic congestive heart failure.   ·  Recommendation: pt. recently discharged end of March, now with SOB, Elevated BNP 4436;  Echo in 3/23 - preserved LVEF 60%, moderate TR, pleural effusion  .  Weight improved since changing lasix to bumex.  Continue current dose 1 mg ivp Bid - change to po on discharge, cont. BB, daily weight, strict i&0s, fluid restriction 1.5L/day,  Repeat BNP 5898 trended up

## 2023-04-13 NOTE — DISCHARGE NOTE PROVIDER - PROVIDER TOKENS
PROVIDER:[TOKEN:[[id number]],FOLLOWUP:[1-3 days],ESTABLISHEDPATIENT:[T]],PROVIDER:[TOKEN:[[id number]],FOLLOWUP:[1-3 days],ESTABLISHEDPATIENT:[T]]

## 2023-04-13 NOTE — PROGRESS NOTE ADULT - NUTRITIONAL ASSESSMENT
This patient has been assessed with a concern for Malnutrition and has been determined to have a diagnosis/diagnoses of Severe protein-calorie malnutrition.    This patient is being managed with:   Diet DASH/TLC-  Sodium & Cholesterol Restricted  1500mL Fluid Restriction ([id number])  Entered: Apr 8 2023  9:27PM  
This patient has been assessed with a concern for Malnutrition and has been determined to have a diagnosis/diagnoses of Severe protein-calorie malnutrition.    This patient is being managed with:   Diet DASH/TLC-  Sodium & Cholesterol Restricted  1500mL Fluid Restriction ([id number])  Entered: Apr 8 2023  9:27PM  
This patient has been assessed with a concern for Malnutrition and has been determined to have a diagnosis/diagnoses of Severe protein-calorie malnutrition.    This patient is being managed with:   Diet DASH/TLC-  Sodium & Cholesterol Restricted  1500mL Fluid Restriction ([id number])  Entered: Apr 8 2023  9:27PM  
This patient has been assessed with a concern for Malnutrition and has been determined to have a diagnosis/diagnoses of Severe protein-calorie malnutrition.    This patient is being managed with:   Diet DASH/TLC-  Sodium & Cholesterol Restricted  1500mL Fluid Restriction ([id number])  Entered: Apr 8 2023  9:27PM  
This patient has been assessed with a concern for Malnutrition and has been determined to have a diagnosis/diagnoses of Severe protein-calorie malnutrition.    This patient is being managed with:   Diet DASH/TLC-  Sodium & Cholesterol Restricted  1500mL Fluid Restriction ([id number])  Entered: Apr 8 2023  9:27PM  
This patient has been assessed with a concern for Malnutrition and has been determined to have a diagnosis/diagnoses of Severe protein-calorie malnutrition.    This patient is being managed with:   Diet DASH/TLC-  Sodium & Cholesterol Restricted  1500mL Fluid Restriction ([id number])  Entered: Apr 8 2023  9:27PM

## 2023-04-13 NOTE — DISCHARGE NOTE PROVIDER - NSDCCAREPROVSEEN_GEN_ALL_CORE_FT
Miles, Pablo Munoz, Arlene Moses, Jerald Leon, Celestine Tavares, Preston Hernandez, Soy Moody, Cristina Palla, Luciano JACKSON

## 2023-04-13 NOTE — DISCHARGE NOTE PROVIDER - NSDCMRMEDTOKEN_GEN_ALL_CORE_FT
Albuterol (Eqv-ProAir HFA) 90 mcg/inh inhalation aerosol: 2 puff(s) inhaled every 6 hours as needed for  shortness of breath and/or wheezing  Anoro Ellipta 62.5 mcg-25 mcg/inh inhalation powder: 1 puff(s) inhaled once a day  Aspir 81 oral delayed release tablet: 1 tab(s) orally once a day  atorvastatin 10 mg oral tablet: 1 tab(s) orally once a day (at bedtime)  bumetanide 1 mg oral tablet: 1 tab(s) orally 2 times a day  cholecalciferol 25 mcg (1000 intl units) oral tablet: 1 tab(s) orally once a day  digoxin 125 mcg (0.125 mg) oral tablet: 1 tab(s) orally once a day  Metoprolol Succinate ER 50 mg oral tablet, extended release: 1 tab(s) orally once a day  omeprazole 20 mg oral delayed release capsule: 1 tab(s) orally once a day  rivaroxaban 15 mg oral tablet: 1 tab(s) orally once a day (in the evening)  tamsulosin 0.4 mg oral capsule: 1 tab(s) orally once a day (at bedtime)

## 2023-04-13 NOTE — DISCHARGE NOTE PROVIDER - NSDCCPCAREPLAN_GEN_ALL_CORE_FT
PRINCIPAL DISCHARGE DIAGNOSIS  Diagnosis: Acute CHF  Assessment and Plan of Treatment: You were treated with IV Bumex. Your Lasix is discontinued. Please continue to take Bumex as prescribed and follow up with your PCP and Cardiologist soon after discharge

## 2023-04-13 NOTE — PROGRESS NOTE ADULT - PROBLEM SELECTOR PLAN 2
·  Problem: Afib.   ·  Recommendation: History of atrial fibrillation, small-medium pericardial effusion, myelofibrosis, recent pneumonia hospitalized at [facility]  .  Patient converted to afib from ST last evening HR 90s  Continue PO digoxin and BB.  May not be able to titrate BB due to soft BP.  Continue xarelto, BB,  digoxin level - 1.96 - wnl, pt; with low Hemoglobin -  transfuse to maintain hemoglobin more than 8

## 2023-04-13 NOTE — DISCHARGE NOTE PROVIDER - CARE PROVIDER_API CALL
Khris Osorio)  Cardiology  [address] [place] [id number]  Phone: ()-  Fax: ()-  Established Patient  Follow Up Time: 1-3 days    CHUCK DESOUZA  Internal Medicine  [address] [place] [phone]  Phone: ()-  Fax: ()-  Established Patient  Follow Up Time: 1-3 days

## 2023-04-13 NOTE — DISCHARGE NOTE PROVIDER - HOSPITAL COURSE
71 y/o F presented with shortness of breath     Vital Signs Last 24 Hrs  T(C): 36.4 (13 Apr 2023 08:00), Max: 36.7 (12 Apr 2023 17:11)  T(F): 97.6 (13 Apr 2023 08:00), Max: 98.1 (12 Apr 2023 17:11)  HR: 100 (13 Apr 2023 09:49) (79 - 101)  BP: 107/54 (13 Apr 2023 08:00) (99/54 - 113/67)  RR: 20 (13 Apr 2023 08:00) (18 - 20)  SpO2: 99% (13 Apr 2023 08:00) (94% - 99%)    Parameters below as of 13 Apr 2023 08:00  Patient On (Oxygen Delivery Method): nasal cannula            1. Acute on chronic hypoxic/hypercapneic respiratory failure secondary to acute CHF on CHFpEF   admit to tele  - c/w supplemental O2 to keep SpO2 88-92%   - SpO2 96% on 3L nasal cannula (pt on 1L during the day and 2L at night at home)   - No expiratory wheezing to suggest COPD exacerbation; however, pt with CO2 retention on VBG, c/w inhalers   - ECHO (3/15/23): LVEF 60%, mild MR, small pericardial effusion, pleural effusion present, mild to moderate pulmonic regurgitation, moderate 2+ TR   - BNP 4436, pt is fluid overloaded on exam   - c/w home medications: beta blockers    - Strict I+Os, daily weights   - 2L H20 restriction, 2g sodium restriction      - Keep K > 4 and Mg > 2    - Cardiology consult - Dr. Leon   -c/w Bumex 1mg PO   -d/c home Lasix     2. Normocytic anemia likely anemia of chronic disease in the setting of myelofibrosis   - Hb 7.1 (baseline ~8), monitor closely   - No hx of bloody stools or tarry stools   FOBT neg  Hb dropped to 6.0  s/p 1 unit PRBC on  4/11  Hb 7.8    3. Hyperglycemia   - Glucose 144, ordered HbA1c     4. Hypoalbuminemia   - Albumin 3.2   - Nutrition consult     5. History of atrial fibrillation, small-medium pericardial effusion, myelofibrosis, recent pneumonia, renal stone, CHFpEF, mild COPD, on supplemental O2 (1L during the day and 2L at night since hospitalization at [facility] for PNA)  - c/w home medications; verified with pt at the bedside   - , will give 1 dose of 2.5 mg lopressor -> monitor on telemetry   - WBC 37.78, , metamyelocytes 6%, myelocytes 5%, alkaline phosphatase 173 -> secondary to meylofibrosis, trend -> pt to start new treatment outpt next week

## 2023-04-13 NOTE — PROGRESS NOTE ADULT - SUBJECTIVE AND OBJECTIVE BOX
CHIEF COMPLAINT: Patient is a 72y old  Female who presents with a chief complaint of     HPI:  71 y/o F with PMH atrial fibrillation, small-medium pericardial effusion, myelofibrosis, recent pneumonia, renal stone, CHFpEF, mild COPD, on supplemental O2 (1L during the day and 2L at night since hospitalization at [facility] for PNA) presented with shortness of breath for the last 2 days. She also reports lower extremity swelling that started a while ago and occassional wheezing and palpitations today. No weight gain (pt was 121 upon d/c and was 119 today). She took 4 baby aspirin before coming to the ER today. Denies fevers, chills, chest pain, abdominal pain, N/V, diarrhea/constipation. Pt was previously on Jakafi which she was not responding to and was switched to Inrebic which was stopped d/t multiple side effects. She is due to start a new agent for myelofibrosis next week.     Prior admission:   - 3/29/23: mild acute on chronic CHFpEF     ER course: , SpO2 96% on 3L nasal cannula. Labs: WBC 37.78, Hb 7.1 (baseline ~8), , metamyelocytes 6%, myelocytes 5%, INR 1.37, glucose 144, albumin 3.2, alkaline phosphatase 173, BNP 4436. VBG: pH 7.41, CO2 50, HCO3 23. COVID and RVP negative.  EKG: Sinus tachycardia with  bpm, normal intervals, no ST segment changes, no T wave inversions (personally reviewed). CXR: cardiomegaly, increased vascular congestion b/l, right pleural effusion (personally reviewed).     Pt was given 80 mg IVP lasix. She is being placed on telemetry observation for further management.  (08 Apr 2023 21:31)    Cardiology consulted for increasing SOB - HFpEF.    4/10/23: denies chest pain, still SOB, palpitations; Tele: Sinus Tachy 110s, diuretic changed to bumex  4/11/23: Offers no complaints of cp/sob/palpitations.  recurrence of atrial fibrillation  with rapid rate Tele: afib 100-115  4/12/23: no cardiac complaints; Tele; Afib 80s  4/13/23: feels better, no SOB; Tele: Afib 90s     MEDICATIONS Home Medications:  Albuterol (Eqv-ProAir HFA) 90 mcg/inh inhalation aerosol: 2 puff(s) inhaled every 6 hours as needed for  shortness of breath and/or wheezing (11 Apr 2023 09:31)  Anoro Ellipta 62.5 mcg-25 mcg/inh inhalation powder: 1 puff(s) inhaled once a day (11 Apr 2023 09:31)  Aspir 81 oral delayed release tablet: 1 tab(s) orally once a day (08 Apr 2023 18:33)  atorvastatin 10 mg oral tablet: 1 tab(s) orally once a day (at bedtime) (11 Apr 2023 09:31)  cholecalciferol 25 mcg (1000 intl units) oral tablet: 1 tab(s) orally once a day (11 Apr 2023 09:31)  digoxin 125 mcg (0.125 mg) oral tablet: 1 tab(s) orally once a day (11 Apr 2023 09:31)  Metoprolol Succinate ER 50 mg oral tablet, extended release: 1 tab(s) orally once a day (08 Apr 2023 18:33)  omeprazole 20 mg oral delayed release capsule: 1 tab(s) orally once a day (11 Apr 2023 09:31)  rivaroxaban 15 mg oral tablet: 1 tab(s) orally once a day (in the evening) (11 Apr 2023 09:31)  tamsulosin 0.4 mg oral capsule: 1 tab(s) orally once a day (at bedtime) (11 Apr 2023 09:31)  MEDICATIONS  (STANDING):  aspirin enteric coated 81 milliGRAM(s) Oral daily  atorvastatin 10 milliGRAM(s) Oral at bedtime  budesonide 160 MICROgram(s)/formoterol 4.5 MICROgram(s) Inhaler 2 Puff(s) Inhalation two times a day  buMETAnide Injectable 1 milliGRAM(s) IV Push two times a day  cholecalciferol 1000 Unit(s) Oral daily  digoxin     Tablet 125 MICROGram(s) Oral daily  metoprolol succinate ER 50 milliGRAM(s) Oral daily  pantoprazole    Tablet 40 milliGRAM(s) Oral before breakfast  rivaroxaban 15 milliGRAM(s) Oral with dinner  tamsulosin 0.4 milliGRAM(s) Oral at bedtime  tiotropium 2.5 MICROgram(s) Inhaler 2 Puff(s) Inhalation daily    MEDICATIONS  (PRN):  acetaminophen     Tablet .. 650 milliGRAM(s) Oral every 6 hours PRN Temp greater or equal to 38C (100.4F), Mild Pain (1 - 3)  albuterol    90 MICROgram(s) HFA Inhaler 2 Puff(s) Inhalation every 6 hours PRN for shortness of breath and/or wheezing  aluminum hydroxide/magnesium hydroxide/simethicone Suspension 30 milliLiter(s) Oral every 4 hours PRN Dyspepsia  melatonin 3 milliGRAM(s) Oral at bedtime PRN Insomnia  ondansetron Injectable 4 milliGRAM(s) IV Push every 8 hours PRN Nausea and/or Vomiting    Vital Signs Last 24 Hrs  T(C): 36.6 (12 Apr 2023 08:24), Max: 36.9 (12 Apr 2023 05:58)  T(F): 97.9 (12 Apr 2023 08:24), Max: 98.4 (12 Apr 2023 05:58)  HR: 95 (12 Apr 2023 08:24) (91 - 106)  BP: 101/59 (12 Apr 2023 08:24) (101/59 - 109/54)  BP(mean): 78 (12 Apr 2023 05:58) (78 - 78)  RR: 18 (12 Apr 2023 08:24) (18 - 18)  SpO2: 93% (12 Apr 2023 08:24) (93% - 98%)    Parameters below as of 12 Apr 2023 08:24  Patient On (Oxygen Delivery Method): nasal cannula      PHYSICAL EXAM:  Constitutional: NAD, awake and alert  HEENT:  EOMI,  Pupils round, No oral cyanosis.  Pulmonary: Non-labored, breath sounds are clear bilaterally  Cardiovascular: S1 and S2, no Murmurs, gallops or rubs  Gastrointestinal: Bowel Sounds present, soft, nontender.   Lymph: No edema  Neurological: Alert, no focal deficits  Skin: No rashes.      LABS:                          7.8    x     )-----------( x        ( 12 Apr 2023 07:43 )             26.3     - TroponinI hsT: <-20.70    04-09    138  |  100  |  31<H>  ----------------------------<  117<H>  4.3   |  37<H>  |  0.67    Ca    8.5      09 Apr 2023 07:23  Mg     1.4     04-08    TPro  7.1  /  Alb  3.1<L>  /  TBili  0.9  /  DBili  x   /  AST  17  /  ALT  16  /  AlkPhos  160<H>  04-09    - TroponinI hsT: <-20.70                        7.0    39.04 )-----------( 705      ( 09 Apr 2023 07:23 )             23.6                         7.1    37.78 )-----------( 726      ( 08 Apr 2023 16:39 )             23.4     09 Apr 2023 07:23    138    |  100    |  31     ----------------------------<  117    4.3     |  37     |  0.67   08 Apr 2023 16:39    136    |  100    |  29     ----------------------------<  144    4.0     |  31     |  0.73     Ca    8.5        09 Apr 2023 07:23  Ca    8.3        08 Apr 2023 16:39  Mg     1.4       08 Apr 2023 16:39    TPro  7.1    /  Alb  3.1    /  TBili  0.9    /  DBili  x      /  AST  17     /  ALT  16     /  AlkPhos  160    09 Apr 2023 07:23  TPro  7.3    /  Alb  3.2    /  TBili  0.9    /  DBili  x      /  AST  25     /  ALT  17     /  AlkPhos  173    08 Apr 2023 16:39    PT/INR - ( 08 Apr 2023 16:39 )   PT: 15.9 sec;   INR: 1.37 ratio         PTT - ( 08 Apr 2023 16:39 )  PTT:36.8 sec        Radiology: reviewed    < from: Xray Chest 1 View- PORTABLE-Urgent (04.08.23 @ 17:07) >    ACC: [id number] EXAM:  XR CHEST PORTABLE URGENT 1V   ORDERED BY: JUDY MEDINA     PROCEDURE DATE:  04/08/2023          INTERPRETATION:  CLINICAL STATEMENT: Chest Pain.    TECHNIQUE: AP view of the chest.      COMPARISON: 3/23/2023      Cardiomegaly.Pulmonary vascular congestion. Increased right base   effusion.    No significant osseous abnormality.    IMPRESSION:    Cardiomegaly. Pulmonary vascular congestion. Increased right base   effusion.    < end of copied text >    -----------------------------------------------------------------------------------------------------  < from: TTE Echo Complete w/o Contrast w/ Doppler (03.15.23 @ 11:22) >   Impression     Summary     The aortic valve is well visualized, appears mildly calcified. Valve   opening seems to be normal.   The ascending aorta appears to be mildly dilated.   The left atrium appears moderately dilated.   The left ventricle is normal in size, wall thickness, wall motion and   contractility.   Estimated left ventricular ejection fraction is 60 %.   IVC is dilated and not collapsing with inspiration.   Mild mitral annular calcification is present.   There is thickening of both mitral valve leaflets. The leaflet opening is   normal.   Mild (1+) mitral regurgitation is present.   A small pericardial effusion is present.   Pleural effusion - is present.   Mild to moderate pulmonic valvular regurgitation (1+) is present.   The right atrium appears mildly dilated.   Normal appearing right ventricle structure and function.   Moderate (2+) tricuspid valve regurgitation is present.     Signature     ----------------------------------------------------------------   Electronically signed by Caroline Perez MD(Interpreting   physician) on 03/15/2023 05:55 PM    < end of copied text >  -----------------------------------------------------------------------------------------------------         REASON FOR VISIT:  AF, Edema    HPI:  71 y/o F with PMH atrial fibrillation, small-medium pericardial effusion, myelofibrosis, recent pneumonia, renal stone, CHFpEF, mild COPD, on supplemental O2 (1L during the day and 2L at night since hospitalization at [facility] for PNA) presented with shortness of breath for the last 2 days. She also reports lower extremity swelling that started a while ago and occassional wheezing and palpitations today. No weight gain (pt was 121 upon d/c and was 119 today). She took 4 baby aspirin before coming to the ER today. Denies fevers, chills, chest pain, abdominal pain, N/V, diarrhea/constipation. Pt was previously on Jakafi which she was not responding to and was switched to Inrebic which was stopped d/t multiple side effects. She is due to start a new agent for myelofibrosis next week.     Prior admission:   - 3/29/23: mild acute on chronic CHFpEF     ER course: , SpO2 96% on 3L nasal cannula. Labs: WBC 37.78, Hb 7.1 (baseline ~8), , metamyelocytes 6%, myelocytes 5%, INR 1.37, glucose 144, albumin 3.2, alkaline phosphatase 173, BNP 4436. VBG: pH 7.41, CO2 50, HCO3 23. COVID and RVP negative.  EKG: Sinus tachycardia with  bpm, normal intervals, no ST segment changes, no T wave inversions (personally reviewed). CXR: cardiomegaly, increased vascular congestion b/l, right pleural effusion (personally reviewed).     Pt was given 80 mg IVP lasix. She is being placed on telemetry observation for further management.  (08 Apr 2023 21:31)    Cardiology consulted for increasing SOB - HFpEF.    4/10/23: denies chest pain, still SOB, palpitations; Tele: Sinus Tachy 110s, diuretic changed to bumex  4/11/23: Offers no complaints of cp/sob/palpitations.  recurrence of atrial fibrillation  with rapid rate Tele: afib 100-115  4/12/23: no cardiac complaints; Tele; Afib 80s  4/13/23: feels better, no SOB; Tele: Afib 90s     MEDICATIONS Home Medications:  Albuterol (Eqv-ProAir HFA) 90 mcg/inh inhalation aerosol: 2 puff(s) inhaled every 6 hours as needed for  shortness of breath and/or wheezing (11 Apr 2023 09:31)  Anoro Ellipta 62.5 mcg-25 mcg/inh inhalation powder: 1 puff(s) inhaled once a day (11 Apr 2023 09:31)  Aspir 81 oral delayed release tablet: 1 tab(s) orally once a day (08 Apr 2023 18:33)  atorvastatin 10 mg oral tablet: 1 tab(s) orally once a day (at bedtime) (11 Apr 2023 09:31)  cholecalciferol 25 mcg (1000 intl units) oral tablet: 1 tab(s) orally once a day (11 Apr 2023 09:31)  digoxin 125 mcg (0.125 mg) oral tablet: 1 tab(s) orally once a day (11 Apr 2023 09:31)  Metoprolol Succinate ER 50 mg oral tablet, extended release: 1 tab(s) orally once a day (08 Apr 2023 18:33)  omeprazole 20 mg oral delayed release capsule: 1 tab(s) orally once a day (11 Apr 2023 09:31)  rivaroxaban 15 mg oral tablet: 1 tab(s) orally once a day (in the evening) (11 Apr 2023 09:31)  tamsulosin 0.4 mg oral capsule: 1 tab(s) orally once a day (at bedtime) (11 Apr 2023 09:31)  MEDICATIONS  (STANDING):  aspirin enteric coated 81 milliGRAM(s) Oral daily  atorvastatin 10 milliGRAM(s) Oral at bedtime  budesonide 160 MICROgram(s)/formoterol 4.5 MICROgram(s) Inhaler 2 Puff(s) Inhalation two times a day  buMETAnide Injectable 1 milliGRAM(s) IV Push two times a day  cholecalciferol 1000 Unit(s) Oral daily  digoxin     Tablet 125 MICROGram(s) Oral daily  metoprolol succinate ER 50 milliGRAM(s) Oral daily  pantoprazole    Tablet 40 milliGRAM(s) Oral before breakfast  rivaroxaban 15 milliGRAM(s) Oral with dinner  tamsulosin 0.4 milliGRAM(s) Oral at bedtime  tiotropium 2.5 MICROgram(s) Inhaler 2 Puff(s) Inhalation daily    MEDICATIONS  (PRN):  acetaminophen     Tablet .. 650 milliGRAM(s) Oral every 6 hours PRN Temp greater or equal to 38C (100.4F), Mild Pain (1 - 3)  albuterol    90 MICROgram(s) HFA Inhaler 2 Puff(s) Inhalation every 6 hours PRN for shortness of breath and/or wheezing  aluminum hydroxide/magnesium hydroxide/simethicone Suspension 30 milliLiter(s) Oral every 4 hours PRN Dyspepsia  melatonin 3 milliGRAM(s) Oral at bedtime PRN Insomnia  ondansetron Injectable 4 milliGRAM(s) IV Push every 8 hours PRN Nausea and/or Vomiting    Vital Signs Last 24 Hrs  T(C): 36.6 (12 Apr 2023 08:24), Max: 36.9 (12 Apr 2023 05:58)  T(F): 97.9 (12 Apr 2023 08:24), Max: 98.4 (12 Apr 2023 05:58)  HR: 95 (12 Apr 2023 08:24) (91 - 106)  BP: 101/59 (12 Apr 2023 08:24) (101/59 - 109/54)  BP(mean): 78 (12 Apr 2023 05:58) (78 - 78)  RR: 18 (12 Apr 2023 08:24) (18 - 18)  SpO2: 93% (12 Apr 2023 08:24) (93% - 98%)    Parameters below as of 12 Apr 2023 08:24  Patient On (Oxygen Delivery Method): nasal cannula      PHYSICAL EXAM:  Constitutional: NAD, awake and alert  HEENT:  EOMI,  Pupils round, No oral cyanosis.  Pulmonary: Non-labored, breath sounds are clear bilaterally  Cardiovascular: S1 and S2, no Murmurs, gallops or rubs  Gastrointestinal: Bowel Sounds present, soft, nontender.   Lymph: No edema  Neurological: Alert, no focal deficits  Skin: No rashes.      LABS:                          7.8    x     )-----------( x        ( 12 Apr 2023 07:43 )             26.3     - TroponinI hsT: <-20.70    04-09    138  |  100  |  31<H>  ----------------------------<  117<H>  4.3   |  37<H>  |  0.67    Ca    8.5      09 Apr 2023 07:23  Mg     1.4     04-08    TPro  7.1  /  Alb  3.1<L>  /  TBili  0.9  /  DBili  x   /  AST  17  /  ALT  16  /  AlkPhos  160<H>  04-09    - TroponinI hsT: <-20.70                        7.0    39.04 )-----------( 705      ( 09 Apr 2023 07:23 )             23.6                         7.1    37.78 )-----------( 726      ( 08 Apr 2023 16:39 )             23.4     09 Apr 2023 07:23    138    |  100    |  31     ----------------------------<  117    4.3     |  37     |  0.67   08 Apr 2023 16:39    136    |  100    |  29     ----------------------------<  144    4.0     |  31     |  0.73     Ca    8.5        09 Apr 2023 07:23  Ca    8.3        08 Apr 2023 16:39  Mg     1.4       08 Apr 2023 16:39    TPro  7.1    /  Alb  3.1    /  TBili  0.9    /  DBili  x      /  AST  17     /  ALT  16     /  AlkPhos  160    09 Apr 2023 07:23  TPro  7.3    /  Alb  3.2    /  TBili  0.9    /  DBili  x      /  AST  25     /  ALT  17     /  AlkPhos  173    08 Apr 2023 16:39    PT/INR - ( 08 Apr 2023 16:39 )   PT: 15.9 sec;   INR: 1.37 ratio         PTT - ( 08 Apr 2023 16:39 )  PTT:36.8 sec        Radiology: reviewed    < from: Xray Chest 1 View- PORTABLE-Urgent (04.08.23 @ 17:07) >    ACC: [id number] EXAM:  XR CHEST PORTABLE URGENT 1V   ORDERED BY: JUDY MEDINA     PROCEDURE DATE:  04/08/2023          INTERPRETATION:  CLINICAL STATEMENT: Chest Pain.    TECHNIQUE: AP view of the chest.      COMPARISON: 3/23/2023      Cardiomegaly.Pulmonary vascular congestion. Increased right base   effusion.    No significant osseous abnormality.    IMPRESSION:    Cardiomegaly. Pulmonary vascular congestion. Increased right base   effusion.    < end of copied text >    -----------------------------------------------------------------------------------------------------  < from: TTE Echo Complete w/o Contrast w/ Doppler (03.15.23 @ 11:22) >   Impression     Summary     The aortic valve is well visualized, appears mildly calcified. Valve   opening seems to be normal.   The ascending aorta appears to be mildly dilated.   The left atrium appears moderately dilated.   The left ventricle is normal in size, wall thickness, wall motion and   contractility.   Estimated left ventricular ejection fraction is 60 %.   IVC is dilated and not collapsing with inspiration.   Mild mitral annular calcification is present.   There is thickening of both mitral valve leaflets. The leaflet opening is   normal.   Mild (1+) mitral regurgitation is present.   A small pericardial effusion is present.   Pleural effusion - is present.   Mild to moderate pulmonic valvular regurgitation (1+) is present.   The right atrium appears mildly dilated.   Normal appearing right ventricle structure and function.   Moderate (2+) tricuspid valve regurgitation is present.     Signature     ----------------------------------------------------------------   Electronically signed by Caroline Perez MD(Interpreting   physician) on 03/15/2023 05:55 PM    < end of copied text >  -----------------------------------------------------------------------------------------------------

## 2023-04-13 NOTE — PROGRESS NOTE ADULT - NS ATTEND AMEND GEN_ALL_CORE FT
agree w/ above.
Patient with  diastolic heart failure  afib with rvr , with severe anemia , prefer to maintain hemoglobin  more than 8  as patient diastolic heart failure afib rapid rate , before increasing digoxin level
Patient seen and examined -- she feels better; edema has resolved; presently in AF but rate controlled (at rest); continue present management.  Case discussed with her  at the bedside.
Patient with  diastolic heart failure  afib with rvr , with severe anemia , prefer to maintain hemoglobin  more than 8  as patient diastolic heart failure afib rapid rate , before increasing digoxin level

## 2023-04-14 ENCOUNTER — TRANSCRIPTION ENCOUNTER (OUTPATIENT)
Age: 73
End: 2023-04-14

## 2023-04-14 ENCOUNTER — APPOINTMENT (OUTPATIENT)
Dept: CARDIOLOGY | Facility: CLINIC | Age: 73
End: 2023-04-14

## 2023-04-18 DIAGNOSIS — I50.33 ACUTE ON CHRONIC DIASTOLIC (CONGESTIVE) HEART FAILURE: ICD-10-CM

## 2023-04-18 DIAGNOSIS — J96.22 ACUTE AND CHRONIC RESPIRATORY FAILURE WITH HYPERCAPNIA: ICD-10-CM

## 2023-04-18 DIAGNOSIS — I48.91 UNSPECIFIED ATRIAL FIBRILLATION: ICD-10-CM

## 2023-04-18 DIAGNOSIS — J44.9 CHRONIC OBSTRUCTIVE PULMONARY DISEASE, UNSPECIFIED: ICD-10-CM

## 2023-04-18 DIAGNOSIS — D63.8 ANEMIA IN OTHER CHRONIC DISEASES CLASSIFIED ELSEWHERE: ICD-10-CM

## 2023-04-18 DIAGNOSIS — Z99.81 DEPENDENCE ON SUPPLEMENTAL OXYGEN: ICD-10-CM

## 2023-04-18 DIAGNOSIS — E43 UNSPECIFIED SEVERE PROTEIN-CALORIE MALNUTRITION: ICD-10-CM

## 2023-04-18 DIAGNOSIS — Z82.61 FAMILY HISTORY OF ARTHRITIS: ICD-10-CM

## 2023-04-18 DIAGNOSIS — R00.0 TACHYCARDIA, UNSPECIFIED: ICD-10-CM

## 2023-04-18 DIAGNOSIS — E87.70 FLUID OVERLOAD, UNSPECIFIED: ICD-10-CM

## 2023-04-18 DIAGNOSIS — Z79.01 LONG TERM (CURRENT) USE OF ANTICOAGULANTS: ICD-10-CM

## 2023-04-18 DIAGNOSIS — I31.39 OTHER PERICARDIAL EFFUSION (NONINFLAMMATORY): ICD-10-CM

## 2023-04-18 DIAGNOSIS — J96.21 ACUTE AND CHRONIC RESPIRATORY FAILURE WITH HYPOXIA: ICD-10-CM

## 2023-04-18 DIAGNOSIS — Z87.891 PERSONAL HISTORY OF NICOTINE DEPENDENCE: ICD-10-CM

## 2023-04-18 DIAGNOSIS — Z79.82 LONG TERM (CURRENT) USE OF ASPIRIN: ICD-10-CM

## 2023-04-18 DIAGNOSIS — R73.9 HYPERGLYCEMIA, UNSPECIFIED: ICD-10-CM

## 2023-04-18 DIAGNOSIS — D75.81 MYELOFIBROSIS: ICD-10-CM

## 2023-04-19 ENCOUNTER — TRANSCRIPTION ENCOUNTER (OUTPATIENT)
Age: 73
End: 2023-04-19

## 2023-04-20 PROBLEM — N28.1 RENAL CYST: Status: ACTIVE | Noted: 2023-04-20

## 2023-04-20 PROBLEM — N20.0 KIDNEY STONE ON RIGHT SIDE: Status: ACTIVE | Noted: 2023-04-20

## 2023-04-20 PROBLEM — N20.1 RIGHT URETERAL STONE: Status: ACTIVE | Noted: 2023-04-20

## 2023-04-20 NOTE — HISTORY OF PRESENT ILLNESS
[FreeTextEntry1] : 72-year-old female presents today for ureteral stone.\par Was seen at [facility] for 4 mm right distal ureteral stone.  Was placed on medical therapy with Flomax.\par Patient was admitted again and during the second admission passed the stone.  Patient is still taking Flomax. \par Patient has no prior history of kidney stones.  Patient has strong family history of kidney stones.\par Patient takes diuretic and urinates every hour to 2 hours day and night. \par Denies dysuria, hematuria, lower abdominal or flank pain, nausea, vomiting, fever, chills or rigors. \par \par

## 2023-04-20 NOTE — PHYSICAL EXAM
[Normal Appearance] : normal appearance [General Appearance - In No Acute Distress] : no acute distress [] : no respiratory distress [FreeTextEntry1] : On portable oxygen  [Oriented To Time, Place, And Person] : oriented to person, place, and time

## 2023-04-20 NOTE — ASSESSMENT
[FreeTextEntry1] : Reviewed outside records on [facility] [employer].\par No stone analysis was seen on review of records.\par \par Urinary frequency:\par Will get Urinalysis and Urine culture \par Discussed Anti cholinergic or Beta 3 agonist. Will hold for now. \par \par Ureteral stone:\par Kidney stone:\par Has punctate right kidney stone.\par Recommended to stop Flomax.\par Recommended Kidney stone prevention diet:\par Good oral hydration so that urine is clear to light yellow, usually 1.5 to 2 Liters of fluids, mainly water\par Increasing Citrate in diet by consuming citrus fruits and juices- monisha, limes, oranges, grapefruits and berries \par Less red meat\par Less salt\par Limit foods with oxalate like- dark green vegetables, rhubarb, chocolate, wheat bran, nuts, cranberries, and beans\par \par Renal cyst:\par Discussed that Renal cysts are a common finding on routine radiological studies. Autopsy studies in patients over the age of 50 reveal greater than a 50% chance of having at least one simple renal cyst.\par And that renal cysts may be classified as simple or complex depending how they look on imaging. Discussed complexity of renal cysts and its implications as regards malignancy. \par \par Return to office as needed.

## 2023-04-27 ENCOUNTER — TRANSCRIPTION ENCOUNTER (OUTPATIENT)
Age: 73
End: 2023-04-27

## 2023-04-27 NOTE — PHYSICAL THERAPY INITIAL EVALUATION ADULT - PLANNED THERAPY INTERVENTIONS, PT EVAL
Pt safe to amb on unit with nsg staff prn. Xelyvettez Pregnancy And Lactation Text: This medication is Pregnancy Category D and is not considered safe during pregnancy.  The risk during breast feeding is also uncertain.

## 2023-05-31 NOTE — PATIENT PROFILE ADULT - HEALTH LITERACY
Topical Clindamycin Counseling: Patient counseled that this medication may cause skin irritation or allergic reactions.  In the event of skin irritation, the patient was advised to reduce the amount of the drug applied or use it less frequently.   The patient verbalized understanding of the proper use and possible adverse effects of clindamycin.  All of the patient's questions and concerns were addressed. Doxycycline Pregnancy And Lactation Text: This medication is Pregnancy Category D and not consider safe during pregnancy. It is also excreted in breast milk but is considered safe for shorter treatment courses. Aklief Pregnancy And Lactation Text: It is unknown if this medication is safe to use during pregnancy.  It is unknown if this medication is excreted in breast milk.  Breastfeeding women should use the topical cream on the smallest area of the skin for the shortest time needed while breastfeeding.  Do not apply to nipple and areola. Azelaic Acid Pregnancy And Lactation Text: This medication is considered safe during pregnancy and breast feeding. High Dose Vitamin A Counseling: Side effects reviewed, pt to contact office should one occur. Dapsone Pregnancy And Lactation Text: This medication is Pregnancy Category C and is not considered safe during pregnancy or breast feeding. Use Enhanced Medication Counseling?: No Minocycline Counseling: Patient advised regarding possible photosensitivity and discoloration of the teeth, skin, lips, tongue and gums.  Patient instructed to avoid sunlight, if possible.  When exposed to sunlight, patients should wear protective clothing, sunglasses, and sunscreen.  The patient was instructed to call the office immediately if the following severe adverse effects occur:  hearing changes, easy bruising/bleeding, severe headache, or vision changes.  The patient verbalized understanding of the proper use and possible adverse effects of minocycline.  All of the patient's questions and concerns were addressed. Tazorac Counseling:  Patient advised that medication is irritating and drying.  Patient may need to apply sparingly and wash off after an hour before eventually leaving it on overnight.  The patient verbalized understanding of the proper use and possible adverse effects of tazorac.  All of the patient's questions and concerns were addressed. Azithromycin Counseling:  I discussed with the patient the risks of azithromycin including but not limited to GI upset, allergic reaction, drug rash, diarrhea, and yeast infections. Detail Level: Detailed Topical Retinoid counseling:  Patient advised to apply a pea-sized amount only at bedtime and wait 30 minutes after washing their face before applying.  If too drying, patient may add a non-comedogenic moisturizer. The patient verbalized understanding of the proper use and possible adverse effects of retinoids.  All of the patient's questions and concerns were addressed. Spironolactone Pregnancy And Lactation Text: This medication can cause feminization of the male fetus and should be avoided during pregnancy. The active metabolite is also found in breast milk. Topical Sulfur Applications Pregnancy And Lactation Text: This medication is Pregnancy Category C and has an unknown safety profile during pregnancy. It is unknown if this topical medication is excreted in breast milk. Birth Control Pills Pregnancy And Lactation Text: This medication should be avoided if pregnant and for the first 30 days post-partum. Winlevi Pregnancy And Lactation Text: This medication is considered safe during pregnancy and breastfeeding. Tetracycline Pregnancy And Lactation Text: This medication is Pregnancy Category D and not consider safe during pregnancy. It is also excreted in breast milk. Bactrim Pregnancy And Lactation Text: This medication is Pregnancy Category D and is known to cause fetal risk.  It is also excreted in breast milk. Erythromycin Counseling:  I discussed with the patient the risks of erythromycin including but not limited to GI upset, allergic reaction, drug rash, diarrhea, increase in liver enzymes, and yeast infections. Benzoyl Peroxide Counseling: Patient counseled that medicine may cause skin irritation and bleach clothing.  In the event of skin irritation, the patient was advised to reduce the amount of the drug applied or use it less frequently.   The patient verbalized understanding of the proper use and possible adverse effects of benzoyl peroxide.  All of the patient's questions and concerns were addressed. Isotretinoin Counseling: Patient should get monthly blood tests, not donate blood, not drive at night if vision affected, not share medication, and not undergo elective surgery for 6 months after tx completed. Side effects reviewed, pt to contact office should one occur. Azelaic Acid Counseling: Patient counseled that medicine may cause skin irritation and to avoid applying near the eyes.  In the event of skin irritation, the patient was advised to reduce the amount of the drug applied or use it less frequently.   The patient verbalized understanding of the proper use and possible adverse effects of azelaic acid.  All of the patient's questions and concerns were addressed. Tazorac Pregnancy And Lactation Text: This medication is not safe during pregnancy. It is unknown if this medication is excreted in breast milk. Azithromycin Pregnancy And Lactation Text: This medication is considered safe during pregnancy and is also secreted in breast milk. Topical Clindamycin Pregnancy And Lactation Text: This medication is Pregnancy Category B and is considered safe during pregnancy. It is unknown if it is excreted in breast milk. Dapsone Counseling: I discussed with the patient the risks of dapsone including but not limited to hemolytic anemia, agranulocytosis, rashes, methemoglobinemia, kidney failure, peripheral neuropathy, headaches, GI upset, and liver toxicity.  Patients who start dapsone require monitoring including baseline LFTs and weekly CBCs for the first month, then every month thereafter.  The patient verbalized understanding of the proper use and possible adverse effects of dapsone.  All of the patient's questions and concerns were addressed. Tetracycline Counseling: Patient counseled regarding possible photosensitivity and increased risk for sunburn.  Patient instructed to avoid sunlight, if possible.  When exposed to sunlight, patients should wear protective clothing, sunglasses, and sunscreen.  The patient was instructed to call the office immediately if the following severe adverse effects occur:  hearing changes, easy bruising/bleeding, severe headache, or vision changes.  The patient verbalized understanding of the proper use and possible adverse effects of tetracycline.  All of the patient's questions and concerns were addressed. Patient understands to avoid pregnancy while on therapy due to potential birth defects. Aklief counseling:  Patient advised to apply a pea-sized amount only at bedtime and wait 30 minutes after washing their face before applying.  If too drying, patient may add a non-comedogenic moisturizer.  The most commonly reported side effects including irritation, redness, scaling, dryness, stinging, burning, itching, and increased risk of sunburn.  The patient verbalized understanding of the proper use and possible adverse effects of retinoids.  All of the patient's questions and concerns were addressed. Doxycycline Counseling:  Patient counseled regarding possible photosensitivity and increased risk for sunburn.  Patient instructed to avoid sunlight, if possible.  When exposed to sunlight, patients should wear protective clothing, sunglasses, and sunscreen.  The patient was instructed to call the office immediately if the following severe adverse effects occur:  hearing changes, easy bruising/bleeding, severe headache, or vision changes.  The patient verbalized understanding of the proper use and possible adverse effects of doxycycline.  All of the patient's questions and concerns were addressed. High Dose Vitamin A Pregnancy And Lactation Text: High dose vitamin A therapy is contraindicated during pregnancy and breast feeding. Winlevi Counseling:  I discussed with the patient the risks of topical clascoterone including but not limited to erythema, scaling, itching, and stinging. Patient voiced their understanding. Isotretinoin Pregnancy And Lactation Text: This medication is Pregnancy Category X and is considered extremely dangerous during pregnancy. It is unknown if it is excreted in breast milk. Benzoyl Peroxide Pregnancy And Lactation Text: This medication is Pregnancy Category C. It is unknown if benzoyl peroxide is excreted in breast milk. Topical Retinoid Pregnancy And Lactation Text: This medication is Pregnancy Category C. It is unknown if this medication is excreted in breast milk. Bactrim Counseling:  I discussed with the patient the risks of sulfa antibiotics including but not limited to GI upset, allergic reaction, drug rash, diarrhea, dizziness, photosensitivity, and yeast infections.  Rarely, more serious reactions can occur including but not limited to aplastic anemia, agranulocytosis, methemoglobinemia, blood dyscrasias, liver or kidney failure, lung infiltrates or desquamative/blistering drug rashes. Erythromycin Pregnancy And Lactation Text: This medication is Pregnancy Category B and is considered safe during pregnancy. It is also excreted in breast milk. Sarecycline Counseling: Patient advised regarding possible photosensitivity and discoloration of the teeth, skin, lips, tongue and gums.  Patient instructed to avoid sunlight, if possible.  When exposed to sunlight, patients should wear protective clothing, sunglasses, and sunscreen.  The patient was instructed to call the office immediately if the following severe adverse effects occur:  hearing changes, easy bruising/bleeding, severe headache, or vision changes.  The patient verbalized understanding of the proper use and possible adverse effects of sarecycline.  All of the patient's questions and concerns were addressed. Topical Sulfur Applications Counseling: Topical Sulfur Counseling: Patient counseled that this medication may cause skin irritation or allergic reactions.  In the event of skin irritation, the patient was advised to reduce the amount of the drug applied or use it less frequently.   The patient verbalized understanding of the proper use and possible adverse effects of topical sulfur application.  All of the patient's questions and concerns were addressed. Spironolactone Counseling: Patient advised regarding risks of diarrhea, abdominal pain, hyperkalemia, birth defects (for female patients), liver toxicity and renal toxicity. The patient may need blood work to monitor liver and kidney function and potassium levels while on therapy. The patient verbalized understanding of the proper use and possible adverse effects of spironolactone.  All of the patient's questions and concerns were addressed. Birth Control Pills Counseling: Birth Control Pill Counseling: I discussed with the patient the potential side effects of OCPs including but not limited to increased risk of stroke, heart attack, thrombophlebitis, deep venous thrombosis, hepatic adenomas, breast changes, GI upset, headaches, and depression.  The patient verbalized understanding of the proper use and possible adverse effects of OCPs. All of the patient's questions and concerns were addressed. no 08-Sep-2018 02:00

## 2023-06-16 ENCOUNTER — INPATIENT (INPATIENT)
Facility: HOSPITAL | Age: 73
LOS: 2 days | Discharge: HOME | DRG: 864 | End: 2023-06-19
Attending: FAMILY MEDICINE | Admitting: INTERNAL MEDICINE
Payer: MEDICARE

## 2023-06-16 VITALS — WEIGHT: 110.89 LBS

## 2023-06-16 DIAGNOSIS — A41.9 SEPSIS, UNSPECIFIED ORGANISM: ICD-10-CM

## 2023-06-16 LAB
ALBUMIN SERPL ELPH-MCNC: 3.3 G/DL — SIGNIFICANT CHANGE UP (ref 3.3–5)
ALP SERPL-CCNC: 138 U/L — HIGH (ref 40–120)
ALT FLD-CCNC: 36 U/L — SIGNIFICANT CHANGE UP (ref 12–78)
ANION GAP SERPL CALC-SCNC: 8 MMOL/L — SIGNIFICANT CHANGE UP (ref 5–17)
ANISOCYTOSIS BLD QL: SIGNIFICANT CHANGE UP
APPEARANCE UR: CLEAR — SIGNIFICANT CHANGE UP
APTT BLD: 42.5 SEC — HIGH (ref 27.5–35.5)
AST SERPL-CCNC: 36 U/L — SIGNIFICANT CHANGE UP (ref 15–37)
BACTERIA # UR AUTO: ABNORMAL
BASO STIPL BLD QL SMEAR: PRESENT — SIGNIFICANT CHANGE UP
BASOPHILS # BLD AUTO: 0.17 K/UL — SIGNIFICANT CHANGE UP (ref 0–0.2)
BASOPHILS NFR BLD AUTO: 3 % — HIGH (ref 0–2)
BILIRUB SERPL-MCNC: 1.1 MG/DL — SIGNIFICANT CHANGE UP (ref 0.2–1.2)
BILIRUB UR-MCNC: NEGATIVE — SIGNIFICANT CHANGE UP
BLD GP AB SCN SERPL QL: SIGNIFICANT CHANGE UP
BUN SERPL-MCNC: 54 MG/DL — HIGH (ref 7–23)
CALCIUM SERPL-MCNC: 7.8 MG/DL — LOW (ref 8.5–10.1)
CHLORIDE SERPL-SCNC: 96 MMOL/L — SIGNIFICANT CHANGE UP (ref 96–108)
CO2 SERPL-SCNC: 26 MMOL/L — SIGNIFICANT CHANGE UP (ref 22–31)
COLOR SPEC: YELLOW — SIGNIFICANT CHANGE UP
CREAT SERPL-MCNC: 1.15 MG/DL — SIGNIFICANT CHANGE UP (ref 0.5–1.3)
DIFF PNL FLD: ABNORMAL
DIR ANTIGLOB POLYSPECIFIC INTERPRETATION: SIGNIFICANT CHANGE UP
EGFR: 51 ML/MIN/1.73M2 — LOW
EOSINOPHIL # BLD AUTO: 0.11 K/UL — SIGNIFICANT CHANGE UP (ref 0–0.5)
EOSINOPHIL NFR BLD AUTO: 2 % — SIGNIFICANT CHANGE UP (ref 0–6)
EPI CELLS # UR: ABNORMAL
GLUCOSE SERPL-MCNC: 92 MG/DL — SIGNIFICANT CHANGE UP (ref 70–99)
GLUCOSE UR QL: NEGATIVE — SIGNIFICANT CHANGE UP
HCT VFR BLD CALC: 16.5 % — CRITICAL LOW (ref 34.5–45)
HGB BLD-MCNC: 5.3 G/DL — CRITICAL LOW (ref 11.5–15.5)
HYALINE CASTS # UR AUTO: ABNORMAL /LPF
HYPOCHROMIA BLD QL: SIGNIFICANT CHANGE UP
INR BLD: 2.86 RATIO — HIGH (ref 0.88–1.16)
KETONES UR-MCNC: NEGATIVE — SIGNIFICANT CHANGE UP
LACTATE SERPL-SCNC: 3.1 MMOL/L — HIGH (ref 0.7–2)
LEUKOCYTE ESTERASE UR-ACNC: ABNORMAL
LG PLATELETS BLD QL AUTO: SLIGHT — SIGNIFICANT CHANGE UP
LYMPHOCYTES # BLD AUTO: 1.2 K/UL — SIGNIFICANT CHANGE UP (ref 1–3.3)
LYMPHOCYTES # BLD AUTO: 21 % — SIGNIFICANT CHANGE UP (ref 13–44)
LYMPHOCYTES # SPEC AUTO: 3 % — HIGH (ref 0–0)
MACROCYTES BLD QL: SLIGHT — SIGNIFICANT CHANGE UP
MANUAL SMEAR VERIFICATION: SIGNIFICANT CHANGE UP
MCHC RBC-ENTMCNC: 31.5 PG — SIGNIFICANT CHANGE UP (ref 27–34)
MCHC RBC-ENTMCNC: 32.1 GM/DL — SIGNIFICANT CHANGE UP (ref 32–36)
MCV RBC AUTO: 98.2 FL — SIGNIFICANT CHANGE UP (ref 80–100)
METAMYELOCYTES # FLD: 4 % — HIGH (ref 0–0)
MONOCYTES # BLD AUTO: 0.34 K/UL — SIGNIFICANT CHANGE UP (ref 0–0.9)
MONOCYTES NFR BLD AUTO: 6 % — SIGNIFICANT CHANGE UP (ref 2–14)
MYELOCYTES NFR BLD: 2 % — HIGH (ref 0–0)
NEUTROPHILS # BLD AUTO: 3.38 K/UL — SIGNIFICANT CHANGE UP (ref 1.8–7.4)
NEUTROPHILS NFR BLD AUTO: 50 % — SIGNIFICANT CHANGE UP (ref 43–77)
NEUTS BAND # BLD: 9 % — HIGH (ref 0–8)
NITRITE UR-MCNC: NEGATIVE — SIGNIFICANT CHANGE UP
NRBC # BLD: 2 /100 — HIGH (ref 0–0)
NRBC # BLD: SIGNIFICANT CHANGE UP /100 WBCS (ref 0–0)
OVALOCYTES BLD QL SMEAR: SLIGHT — SIGNIFICANT CHANGE UP
PH UR: 5 — SIGNIFICANT CHANGE UP (ref 5–8)
PLAT MORPH BLD: NORMAL — SIGNIFICANT CHANGE UP
PLATELET # BLD AUTO: 482 K/UL — HIGH (ref 150–400)
POIKILOCYTOSIS BLD QL AUTO: SLIGHT — SIGNIFICANT CHANGE UP
POTASSIUM SERPL-MCNC: 4.6 MMOL/L — SIGNIFICANT CHANGE UP (ref 3.5–5.3)
POTASSIUM SERPL-SCNC: 4.6 MMOL/L — SIGNIFICANT CHANGE UP (ref 3.5–5.3)
PROT SERPL-MCNC: 6.5 GM/DL — SIGNIFICANT CHANGE UP (ref 6–8.3)
PROT UR-MCNC: 30 MG/DL
PROTHROM AB SERPL-ACNC: 33.5 SEC — HIGH (ref 10.5–13.4)
RAPID RVP RESULT: SIGNIFICANT CHANGE UP
RBC # BLD: 1.68 M/UL — LOW (ref 3.8–5.2)
RBC # FLD: 23.9 % — HIGH (ref 10.3–14.5)
RBC BLD AUTO: ABNORMAL
RBC CASTS # UR COMP ASSIST: ABNORMAL /HPF (ref 0–4)
SARS-COV-2 RNA SPEC QL NAA+PROBE: SIGNIFICANT CHANGE UP
SODIUM SERPL-SCNC: 130 MMOL/L — LOW (ref 135–145)
SP GR SPEC: 1.01 — SIGNIFICANT CHANGE UP (ref 1.01–1.02)
STOMATOCYTES BLD QL SMEAR: SLIGHT — SIGNIFICANT CHANGE UP
TROPONIN I, HIGH SENSITIVITY RESULT: 8.18 NG/L — SIGNIFICANT CHANGE UP
UROBILINOGEN FLD QL: NEGATIVE — SIGNIFICANT CHANGE UP
WBC # BLD: 5.73 K/UL — SIGNIFICANT CHANGE UP (ref 3.8–10.5)
WBC # FLD AUTO: 5.73 K/UL — SIGNIFICANT CHANGE UP (ref 3.8–10.5)
WBC UR QL: >50 /HPF (ref 0–5)

## 2023-06-16 PROCEDURE — 80048 BASIC METABOLIC PNL TOTAL CA: CPT

## 2023-06-16 PROCEDURE — 99285 EMERGENCY DEPT VISIT HI MDM: CPT

## 2023-06-16 PROCEDURE — 83615 LACTATE (LD) (LDH) ENZYME: CPT

## 2023-06-16 PROCEDURE — 87484 EHRLICHA CHAFFEENSIS AMP PRB: CPT

## 2023-06-16 PROCEDURE — 86757 RICKETTSIA ANTIBODY: CPT

## 2023-06-16 PROCEDURE — 86665 EPSTEIN-BARR CAPSID VCA: CPT

## 2023-06-16 PROCEDURE — 87468 ANAPLSMA PHGCYTOPHLM AMP PRB: CPT

## 2023-06-16 PROCEDURE — 86922 COMPATIBILITY TEST ANTIGLOB: CPT

## 2023-06-16 PROCEDURE — 36415 COLL VENOUS BLD VENIPUNCTURE: CPT

## 2023-06-16 PROCEDURE — 86644 CMV ANTIBODY: CPT

## 2023-06-16 PROCEDURE — 93010 ELECTROCARDIOGRAM REPORT: CPT

## 2023-06-16 PROCEDURE — 86077 PHYS BLOOD BANK SERV XMATCH: CPT

## 2023-06-16 PROCEDURE — 86788 WEST NILE VIRUS AB IGM: CPT

## 2023-06-16 PROCEDURE — 85652 RBC SED RATE AUTOMATED: CPT

## 2023-06-16 PROCEDURE — 86870 RBC ANTIBODY IDENTIFICATION: CPT

## 2023-06-16 PROCEDURE — 36430 TRANSFUSION BLD/BLD COMPNT: CPT

## 2023-06-16 PROCEDURE — 86905 BLOOD TYPING RBC ANTIGENS: CPT

## 2023-06-16 PROCEDURE — 85027 COMPLETE CBC AUTOMATED: CPT

## 2023-06-16 PROCEDURE — 71045 X-RAY EXAM CHEST 1 VIEW: CPT | Mod: 26

## 2023-06-16 PROCEDURE — 86664 EPSTEIN-BARR NUCLEAR ANTIGEN: CPT

## 2023-06-16 PROCEDURE — 84100 ASSAY OF PHOSPHORUS: CPT

## 2023-06-16 PROCEDURE — 86140 C-REACTIVE PROTEIN: CPT

## 2023-06-16 PROCEDURE — 85014 HEMATOCRIT: CPT

## 2023-06-16 PROCEDURE — 83605 ASSAY OF LACTIC ACID: CPT

## 2023-06-16 PROCEDURE — 87798 DETECT AGENT NOS DNA AMP: CPT

## 2023-06-16 PROCEDURE — 86921 COMPATIBILITY TEST INCUBATE: CPT

## 2023-06-16 PROCEDURE — 80053 COMPREHEN METABOLIC PANEL: CPT

## 2023-06-16 PROCEDURE — 86618 LYME DISEASE ANTIBODY: CPT

## 2023-06-16 PROCEDURE — 86663 EPSTEIN-BARR ANTIBODY: CPT

## 2023-06-16 PROCEDURE — P9016: CPT

## 2023-06-16 PROCEDURE — 83735 ASSAY OF MAGNESIUM: CPT

## 2023-06-16 PROCEDURE — 86920 COMPATIBILITY TEST SPIN: CPT

## 2023-06-16 PROCEDURE — 86645 CMV ANTIBODY IGM: CPT

## 2023-06-16 PROCEDURE — 86789 WEST NILE VIRUS ANTIBODY: CPT

## 2023-06-16 PROCEDURE — 85018 HEMOGLOBIN: CPT

## 2023-06-16 RX ORDER — VANCOMYCIN HCL 1 G
750 VIAL (EA) INTRAVENOUS ONCE
Refills: 0 | Status: COMPLETED | OUTPATIENT
Start: 2023-06-16 | End: 2023-06-16

## 2023-06-16 RX ORDER — SODIUM CHLORIDE 9 MG/ML
1550 INJECTION INTRAMUSCULAR; INTRAVENOUS; SUBCUTANEOUS ONCE
Refills: 0 | Status: COMPLETED | OUTPATIENT
Start: 2023-06-16 | End: 2023-06-16

## 2023-06-16 RX ORDER — CEFEPIME 1 G/1
1000 INJECTION, POWDER, FOR SOLUTION INTRAMUSCULAR; INTRAVENOUS ONCE
Refills: 0 | Status: COMPLETED | OUTPATIENT
Start: 2023-06-16 | End: 2023-06-16

## 2023-06-16 RX ORDER — CEFEPIME 1 G/1
1000 INJECTION, POWDER, FOR SOLUTION INTRAMUSCULAR; INTRAVENOUS ONCE
Refills: 0 | Status: DISCONTINUED | OUTPATIENT
Start: 2023-06-16 | End: 2023-06-16

## 2023-06-16 RX ADMIN — SODIUM CHLORIDE 1550 MILLILITER(S): 9 INJECTION INTRAMUSCULAR; INTRAVENOUS; SUBCUTANEOUS at 20:46

## 2023-06-16 RX ADMIN — Medication 250 MILLIGRAM(S): at 21:06

## 2023-06-16 RX ADMIN — CEFEPIME 1000 MILLIGRAM(S): 1 INJECTION, POWDER, FOR SOLUTION INTRAMUSCULAR; INTRAVENOUS at 22:28

## 2023-06-16 NOTE — ED ADULT NURSE NOTE - NSFALLASSESSNEED_ED_ALL_ED
Luis Bagley said [facility] doesn't have the Tussionex in stock and not sure when they'd get it in  She is having difficulty sleeping and would like an alternative to be called in 
yes

## 2023-06-16 NOTE — ED ADULT TRIAGE NOTE - HISTORY OF COVID-19 VACCINATION
Unable to perform paracentesis due to insufficient fluid volume, See NP report for details.    Vaccine status unknown

## 2023-06-16 NOTE — ED ADULT NURSE NOTE - OBJECTIVE STATEMENT
pt is 73 y/o female c/c of fever, onset 3 days ago. pt is on a new medication for myelofibrosis and Myelodysplastic syndrome. pt spouse at bedside reports fever could be side effect from medication. pt placed on cardiac monitor. pt took tylenol 3hrs ago.

## 2023-06-16 NOTE — ED PROVIDER NOTE - OBJECTIVE STATEMENT
73 yo female with PMHx of fatigue, fever, mylofirboris since wed. pt was going for a blood trasnfusion yesterday win pt is anemic. was 7.4 yesterday before going. denies sob, cp, any pain. tylenol last taken 6pm today.    PCP: Dr. Schuler.

## 2023-06-16 NOTE — ED ADULT TRIAGE NOTE - CHIEF COMPLAINT QUOTE
pt non-ambulatory to triage assisted by wheelchair sent by [facility] for 3 days fever temp high 101.0 F.  Last took tylenol at 550pm with relief. Brought temp down 99.2 F.    Pt has hx of myelofibrosis and MDS.

## 2023-06-16 NOTE — ED ADULT NURSE NOTE - NSFALLRISKINTERV_ED_ALL_ED

## 2023-06-17 PROBLEM — I48.91 UNSPECIFIED ATRIAL FIBRILLATION: Chronic | Status: ACTIVE | Noted: 2023-04-08

## 2023-06-17 PROBLEM — J44.9 CHRONIC OBSTRUCTIVE PULMONARY DISEASE, UNSPECIFIED: Chronic | Status: ACTIVE | Noted: 2023-04-08

## 2023-06-17 PROBLEM — Z99.81 DEPENDENCE ON SUPPLEMENTAL OXYGEN: Chronic | Status: ACTIVE | Noted: 2023-04-08

## 2023-06-17 PROBLEM — I31.39 OTHER PERICARDIAL EFFUSION (NONINFLAMMATORY): Chronic | Status: ACTIVE | Noted: 2023-04-08

## 2023-06-17 PROBLEM — N20.0 CALCULUS OF KIDNEY: Chronic | Status: ACTIVE | Noted: 2023-04-08

## 2023-06-17 PROBLEM — Z86.79 PERSONAL HISTORY OF OTHER DISEASES OF THE CIRCULATORY SYSTEM: Chronic | Status: ACTIVE | Noted: 2023-04-08

## 2023-06-17 LAB
ALBUMIN SERPL ELPH-MCNC: 2.7 G/DL — LOW (ref 3.3–5)
ALLERGY+IMMUNOLOGY DIAG STUDY NOTE: SIGNIFICANT CHANGE UP
ALP SERPL-CCNC: 111 U/L — SIGNIFICANT CHANGE UP (ref 40–120)
ALT FLD-CCNC: 34 U/L — SIGNIFICANT CHANGE UP (ref 12–78)
ANION GAP SERPL CALC-SCNC: 8 MMOL/L — SIGNIFICANT CHANGE UP (ref 5–17)
ANTIBODY INTERPRETATION 2: SIGNIFICANT CHANGE UP
ANTIBODY INTERPRETATION 3: SIGNIFICANT CHANGE UP
AST SERPL-CCNC: 27 U/L — SIGNIFICANT CHANGE UP (ref 15–37)
B BURGDOR C6 AB SER-ACNC: NEGATIVE — SIGNIFICANT CHANGE UP
B BURGDOR IGG+IGM SER-ACNC: 0.34 INDEX — SIGNIFICANT CHANGE UP (ref 0.01–0.89)
BABESIA MICROTI PCR, BLD RESULT: SIGNIFICANT CHANGE UP
BILIRUB SERPL-MCNC: 1.3 MG/DL — HIGH (ref 0.2–1.2)
BUN SERPL-MCNC: 47 MG/DL — HIGH (ref 7–23)
CALCIUM SERPL-MCNC: 6.9 MG/DL — LOW (ref 8.5–10.1)
CHLORIDE SERPL-SCNC: 99 MMOL/L — SIGNIFICANT CHANGE UP (ref 96–108)
CMV IGG FLD QL: >10 U/ML — HIGH
CMV IGG SERPL-IMP: POSITIVE
CMV IGM FLD-ACNC: <8 AU/ML — SIGNIFICANT CHANGE UP
CMV IGM SERPL QL: NEGATIVE — SIGNIFICANT CHANGE UP
CO2 SERPL-SCNC: 25 MMOL/L — SIGNIFICANT CHANGE UP (ref 22–31)
CREAT SERPL-MCNC: 0.95 MG/DL — SIGNIFICANT CHANGE UP (ref 0.5–1.3)
CRP SERPL-MCNC: 37 MG/L — HIGH
EBV EA AB SER IA-ACNC: 12.6 U/ML — HIGH
EBV EA AB TITR SER IF: POSITIVE
EBV EA IGG SER-ACNC: POSITIVE
EBV NA IGG SER IA-ACNC: 77.3 U/ML — HIGH
EBV PATRN SPEC IB-IMP: SIGNIFICANT CHANGE UP
EBV VCA IGG AVIDITY SER QL IA: POSITIVE
EBV VCA IGM SER IA-ACNC: 289 U/ML — HIGH
EBV VCA IGM SER IA-ACNC: <10 U/ML — SIGNIFICANT CHANGE UP
EBV VCA IGM TITR FLD: NEGATIVE — SIGNIFICANT CHANGE UP
EGFR: 64 ML/MIN/1.73M2 — SIGNIFICANT CHANGE UP
ERYTHROCYTE [SEDIMENTATION RATE] IN BLOOD: 40 MM/HR — HIGH (ref 0–20)
GLUCOSE SERPL-MCNC: 96 MG/DL — SIGNIFICANT CHANGE UP (ref 70–99)
HCT VFR BLD CALC: 15.7 % — CRITICAL LOW (ref 34.5–45)
HCT VFR BLD CALC: 19.1 % — CRITICAL LOW (ref 34.5–45)
HGB BLD-MCNC: 5.1 G/DL — CRITICAL LOW (ref 11.5–15.5)
HGB BLD-MCNC: 6.3 G/DL — CRITICAL LOW (ref 11.5–15.5)
LACTATE SERPL-SCNC: 1.5 MMOL/L — SIGNIFICANT CHANGE UP (ref 0.7–2)
LACTATE SERPL-SCNC: 2.5 MMOL/L — HIGH (ref 0.7–2)
LDH SERPL L TO P-CCNC: 665 U/L — HIGH (ref 84–241)
MCHC RBC-ENTMCNC: 31.3 PG — SIGNIFICANT CHANGE UP (ref 27–34)
MCHC RBC-ENTMCNC: 32.5 GM/DL — SIGNIFICANT CHANGE UP (ref 32–36)
MCV RBC AUTO: 96.3 FL — SIGNIFICANT CHANGE UP (ref 80–100)
NRBC # BLD: 3 /100 WBCS — HIGH (ref 0–0)
PLATELET # BLD AUTO: 343 K/UL — SIGNIFICANT CHANGE UP (ref 150–400)
POTASSIUM SERPL-MCNC: 3.7 MMOL/L — SIGNIFICANT CHANGE UP (ref 3.5–5.3)
POTASSIUM SERPL-SCNC: 3.7 MMOL/L — SIGNIFICANT CHANGE UP (ref 3.5–5.3)
PROT SERPL-MCNC: 5.5 GM/DL — LOW (ref 6–8.3)
RBC # BLD: 1.63 M/UL — LOW (ref 3.8–5.2)
RBC # FLD: 22.6 % — HIGH (ref 10.3–14.5)
SODIUM SERPL-SCNC: 132 MMOL/L — LOW (ref 135–145)
WBC # BLD: 3.21 K/UL — LOW (ref 3.8–10.5)
WBC # FLD AUTO: 3.21 K/UL — LOW (ref 3.8–10.5)

## 2023-06-17 PROCEDURE — 99223 1ST HOSP IP/OBS HIGH 75: CPT

## 2023-06-17 RX ORDER — ASPIRIN/CALCIUM CARB/MAGNESIUM 324 MG
81 TABLET ORAL DAILY
Refills: 0 | Status: DISCONTINUED | OUTPATIENT
Start: 2023-06-17 | End: 2023-06-19

## 2023-06-17 RX ORDER — FUROSEMIDE 40 MG
20 TABLET ORAL ONCE
Refills: 0 | Status: COMPLETED | OUTPATIENT
Start: 2023-06-17 | End: 2023-06-17

## 2023-06-17 RX ORDER — TAMSULOSIN HYDROCHLORIDE 0.4 MG/1
1 CAPSULE ORAL
Refills: 0 | DISCHARGE

## 2023-06-17 RX ORDER — FUROSEMIDE 40 MG
20 TABLET ORAL ONCE
Refills: 0 | Status: COMPLETED | OUTPATIENT
Start: 2023-06-17 | End: 2023-06-18

## 2023-06-17 RX ORDER — ACETAMINOPHEN 500 MG
650 TABLET ORAL EVERY 6 HOURS
Refills: 0 | Status: DISCONTINUED | OUTPATIENT
Start: 2023-06-17 | End: 2023-06-19

## 2023-06-17 RX ORDER — CEFEPIME 1 G/1
1000 INJECTION, POWDER, FOR SOLUTION INTRAMUSCULAR; INTRAVENOUS EVERY 12 HOURS
Refills: 0 | Status: DISCONTINUED | OUTPATIENT
Start: 2023-06-17 | End: 2023-06-17

## 2023-06-17 RX ORDER — METOPROLOL TARTRATE 50 MG
50 TABLET ORAL DAILY
Refills: 0 | Status: DISCONTINUED | OUTPATIENT
Start: 2023-06-17 | End: 2023-06-19

## 2023-06-17 RX ORDER — CHOLECALCIFEROL (VITAMIN D3) 125 MCG
1000 CAPSULE ORAL DAILY
Refills: 0 | Status: DISCONTINUED | OUTPATIENT
Start: 2023-06-17 | End: 2023-06-19

## 2023-06-17 RX ORDER — ALLOPURINOL 300 MG
300 TABLET ORAL DAILY
Refills: 0 | Status: DISCONTINUED | OUTPATIENT
Start: 2023-06-17 | End: 2023-06-19

## 2023-06-17 RX ORDER — PANTOPRAZOLE SODIUM 20 MG/1
40 TABLET, DELAYED RELEASE ORAL
Refills: 0 | Status: DISCONTINUED | OUTPATIENT
Start: 2023-06-17 | End: 2023-06-19

## 2023-06-17 RX ORDER — ACETAMINOPHEN 500 MG
650 TABLET ORAL ONCE
Refills: 0 | Status: COMPLETED | OUTPATIENT
Start: 2023-06-17 | End: 2023-06-17

## 2023-06-17 RX ORDER — DIPHENHYDRAMINE HCL 50 MG
25 CAPSULE ORAL ONCE
Refills: 0 | Status: COMPLETED | OUTPATIENT
Start: 2023-06-17 | End: 2023-06-17

## 2023-06-17 RX ORDER — ONDANSETRON 8 MG/1
4 TABLET, FILM COATED ORAL EVERY 8 HOURS
Refills: 0 | Status: DISCONTINUED | OUTPATIENT
Start: 2023-06-17 | End: 2023-06-19

## 2023-06-17 RX ORDER — ALBUTEROL 90 UG/1
2 AEROSOL, METERED ORAL EVERY 6 HOURS
Refills: 0 | Status: DISCONTINUED | OUTPATIENT
Start: 2023-06-17 | End: 2023-06-19

## 2023-06-17 RX ORDER — ATORVASTATIN CALCIUM 80 MG/1
10 TABLET, FILM COATED ORAL AT BEDTIME
Refills: 0 | Status: DISCONTINUED | OUTPATIENT
Start: 2023-06-17 | End: 2023-06-19

## 2023-06-17 RX ORDER — CEFEPIME 1 G/1
2000 INJECTION, POWDER, FOR SOLUTION INTRAMUSCULAR; INTRAVENOUS EVERY 12 HOURS
Refills: 0 | Status: DISCONTINUED | OUTPATIENT
Start: 2023-06-17 | End: 2023-06-18

## 2023-06-17 RX ORDER — DIGOXIN 250 MCG
125 TABLET ORAL DAILY
Refills: 0 | Status: DISCONTINUED | OUTPATIENT
Start: 2023-06-17 | End: 2023-06-19

## 2023-06-17 RX ORDER — LENALIDOMIDE 5 MG/1
10 CAPSULE ORAL DAILY
Refills: 0 | Status: DISCONTINUED | OUTPATIENT
Start: 2023-06-17 | End: 2023-06-17

## 2023-06-17 RX ORDER — LANOLIN ALCOHOL/MO/W.PET/CERES
3 CREAM (GRAM) TOPICAL AT BEDTIME
Refills: 0 | Status: DISCONTINUED | OUTPATIENT
Start: 2023-06-17 | End: 2023-06-19

## 2023-06-17 RX ORDER — DIPHENHYDRAMINE HCL 50 MG
50 CAPSULE ORAL ONCE
Refills: 0 | Status: COMPLETED | OUTPATIENT
Start: 2023-06-17 | End: 2023-06-17

## 2023-06-17 RX ORDER — RIVAROXABAN 15 MG-20MG
15 KIT ORAL DAILY
Refills: 0 | Status: DISCONTINUED | OUTPATIENT
Start: 2023-06-17 | End: 2023-06-19

## 2023-06-17 RX ORDER — FUROSEMIDE 40 MG
20 TABLET ORAL ONCE
Refills: 0 | Status: DISCONTINUED | OUTPATIENT
Start: 2023-06-17 | End: 2023-06-17

## 2023-06-17 RX ADMIN — Medication 650 MILLIGRAM(S): at 09:01

## 2023-06-17 RX ADMIN — Medication 650 MILLIGRAM(S): at 20:26

## 2023-06-17 RX ADMIN — ATORVASTATIN CALCIUM 10 MILLIGRAM(S): 80 TABLET, FILM COATED ORAL at 23:58

## 2023-06-17 RX ADMIN — Medication 300 MILLIGRAM(S): at 09:02

## 2023-06-17 RX ADMIN — CEFEPIME 100 MILLIGRAM(S): 1 INJECTION, POWDER, FOR SOLUTION INTRAMUSCULAR; INTRAVENOUS at 10:53

## 2023-06-17 RX ADMIN — Medication 650 MILLIGRAM(S): at 03:37

## 2023-06-17 RX ADMIN — Medication 81 MILLIGRAM(S): at 09:01

## 2023-06-17 RX ADMIN — Medication 1000 UNIT(S): at 09:01

## 2023-06-17 RX ADMIN — Medication 650 MILLIGRAM(S): at 23:58

## 2023-06-17 RX ADMIN — Medication 125 MICROGRAM(S): at 09:02

## 2023-06-17 RX ADMIN — Medication 650 MILLIGRAM(S): at 10:00

## 2023-06-17 RX ADMIN — RIVAROXABAN 15 MILLIGRAM(S): KIT at 09:03

## 2023-06-17 RX ADMIN — Medication 50 MILLIGRAM(S): at 20:26

## 2023-06-17 RX ADMIN — CEFEPIME 1000 MILLIGRAM(S): 1 INJECTION, POWDER, FOR SOLUTION INTRAMUSCULAR; INTRAVENOUS at 09:03

## 2023-06-17 RX ADMIN — Medication 650 MILLIGRAM(S): at 04:40

## 2023-06-17 RX ADMIN — Medication 20 MILLIGRAM(S): at 08:41

## 2023-06-17 RX ADMIN — Medication 25 MILLIGRAM(S): at 06:28

## 2023-06-17 RX ADMIN — Medication 20 MILLIGRAM(S): at 17:11

## 2023-06-17 RX ADMIN — Medication 650 MILLIGRAM(S): at 17:51

## 2023-06-17 RX ADMIN — Medication 25 MILLIGRAM(S): at 09:01

## 2023-06-17 RX ADMIN — PANTOPRAZOLE SODIUM 40 MILLIGRAM(S): 20 TABLET, DELAYED RELEASE ORAL at 09:01

## 2023-06-17 NOTE — CONSULT NOTE ADULT - ASSESSMENT
72 year old female with PV MF not presently planning to proceed with BMT per patient now admitted with sespis and fever furhter complicated by acute on chronic anemia. secondary to MF     # MF   - patient has been followed by [facility]   - now presently on REVLIMID 10mg PO D1 -D21   - would hold REVLIMID in the interim     # Sepsis   - will await cultures   - Blood and urine pending   - now on CEFEPIME     # Anemia   - outpatient labs suggestive of chronic anemia   - would recommend transfusion today Hb 5.1   - keep type and screen active

## 2023-06-17 NOTE — H&P ADULT - HISTORY OF PRESENT ILLNESS
71 yo Female with PMHx of Myelofibrosis started to have fever 3 days ago. Her fever was as high as 102 F yesterday. She came to the ED as she was advised by her Hematologist for evaluation of her fever. She got a PRBC trasnfusion last Monday. She is chronically anemic due MDS. Gets PRBC almost every week. Her Hb is 5.3 in ED today. She denies sob, chest pain or any pain. She took tylenol 2 hours before arriving in ED. She has no active bleeding. Her Systolic BP is between  at home.

## 2023-06-17 NOTE — CONSULT NOTE ADULT - SUBJECTIVE AND OBJECTIVE BOX
HPI:  73 yo Female with PMHx of Myelofibrosis started to have fever 3 days ago. Her fever was as high as 102 F yesterday. She came to the ED as she was advised by her Hematologist for evaluation of her fever. She got a PRBC trasnfusion last Monday. She is chronically anemic due MDS. Gets PRBC almost every week. Her Hb is 5.3 in ED today. She denies sob, chest pain or any pain. She took tylenol 2 hours before arriving in ED. She has no active bleeding. Her Systolic BP is between  at home.  (17 Jun 2023 02:57)       72-year-old female who initially had been diagnosed with myelofibrosis which dates back in 2016 whereby patient has been initially treated with Hydrea approximately as well as JAKAFI .  She has been stable for several years until approximately 2022.The patient at this point has been seen by [facility] primarily due to concern regarding possible transplant.Unfortunately as of recent she has been informed that she will not be proceeding with transplant due to antibodies per patient. in the interim presented to the hospital a with a high-grade fever 102.  Patient will be admitted for concern regarding possible underlying infection. Cultures still pending. Now on broad spectrum abx. Hb now 5.1. Planned for unit of pRBCs today.    PAST MEDICAL & SURGICAL HISTORY:  Myelofibrosis      PNA (pneumonia)      Anemia      Atrial fibrillation      Pericardial effusion      Kidney stone      COPD, mild      H/O CHF      On home oxygen therapy      No significant past surgical history          Allergies    No Known Allergies    Intolerances        MEDICATIONS  (STANDING):  allopurinol 300 milliGRAM(s) Oral daily  aspirin enteric coated 81 milliGRAM(s) Oral daily  atorvastatin 10 milliGRAM(s) Oral at bedtime  cefepime   IVPB 2000 milliGRAM(s) IV Intermittent every 12 hours  cholecalciferol 1000 Unit(s) Oral daily  digoxin     Tablet 125 MICROGram(s) Oral daily  lenalidomide 10 milliGRAM(s) Oral daily  metoprolol succinate ER 50 milliGRAM(s) Oral daily  pantoprazole    Tablet 40 milliGRAM(s) Oral before breakfast  rivaroxaban 15 milliGRAM(s) Oral daily    MEDICATIONS  (PRN):  acetaminophen     Tablet .. 650 milliGRAM(s) Oral every 6 hours PRN Temp greater or equal to 38C (100.4F), Mild Pain (1 - 3)  albuterol    90 MICROgram(s) HFA Inhaler 2 Puff(s) Inhalation every 6 hours PRN for shortness of breath and/or wheezing  aluminum hydroxide/magnesium hydroxide/simethicone Suspension 30 milliLiter(s) Oral every 4 hours PRN Dyspepsia  melatonin 3 milliGRAM(s) Oral at bedtime PRN Insomnia  ondansetron Injectable 4 milliGRAM(s) IV Push every 8 hours PRN Nausea and/or Vomiting      FAMILY HISTORY:  FH: arthritis (Mother)        SOCIAL HISTORY: No EtOH, no tobacco    REVIEW OF SYSTEMS:    CONSTITUTIONAL: No weakness, fevers or chills  EYES/ENT: No visual changes;  No vertigo or throat pain   NECK: No pain or stiffness  RESPIRATORY: No cough, wheezing, hemoptysis; No shortness of breath  CARDIOVASCULAR: No chest pain or palpitations  GASTROINTESTINAL: No abdominal or epigastric pain. No nausea, vomiting, or hematemesis; No diarrhea or constipation. No melena or hematochezia.  GENITOURINARY: No dysuria, frequency or hematuria  NEUROLOGICAL: No numbness or weakness  SKIN: No itching, burning, rashes, or lesions   All other review of systems is negative unless indicated above.      Weight (kg): 50.3 (06-16 @ 20:05)    T(F): 99.1 (06-17-23 @ 12:15), Max: 101 (06-17-23 @ 03:30)  HR: 97 (06-17-23 @ 12:15)  BP: 102/63 (06-17-23 @ 12:15)  RR: 18 (06-17-23 @ 12:15)  SpO2: 93% (06-17-23 @ 12:15)  Wt(kg): --    GENERAL: NAD, well-developed  HEAD:  Atraumatic, Normocephalic  EYES: EOMI, PERRLA, conjunctiva and sclera clear  NECK: Supple, No JVD  CHEST/LUNG: Clear to auscultation bilaterally; No wheeze  HEART: Regular rate and rhythm; No murmurs, rubs, or gallops  ABDOMEN: Soft, Nontender, Nondistended; Bowel sounds present  EXTREMITIES:  2+ Peripheral Pulses, No clubbing, cyanosis, or edema  NEUROLOGY: non-focal  SKIN: No rashes or lesions                          5.1    3.21  )-----------( 343      ( 17 Jun 2023 07:32 )             15.7       06-17    132<L>  |  99  |  47<H>  ----------------------------<  96  3.7   |  25  |  0.95    Ca    6.9<L>      17 Jun 2023 07:32    TPro  5.5<L>  /  Alb  2.7<L>  /  TBili  1.3<H>  /  DBili  x   /  AST  27  /  ALT  34  /  AlkPhos  111  06-17      Lactate Dehydrogenase, Serum: 665 U/L (06-17 @ 11:16)      PT/INR - ( 16 Jun 2023 20:30 )   PT: 33.5 sec;   INR: 2.86 ratio         PTT - ( 16 Jun 2023 20:30 )  PTT:42.5 sec    Clean Catch Clean Catch (Midstream)  03-23 @ 19:40   <10,000 CFU/mL Normal Urogenital Tammi  --  --

## 2023-06-17 NOTE — PROGRESS NOTE ADULT - ATTENDING COMMENTS
#Fevers of Unknown Origin  #Hx of Immunosuppression  -Admit to Medicine   -No source of infxn at this time  -BCx and UCx pending  -Tick labs pending   -s/p Cefepime 1g Q12H in ED  -cont Cefepime 2g Q12H IV   -ID recs appreciated

## 2023-06-17 NOTE — PROVIDER CONTACT NOTE (CRITICAL VALUE NOTIFICATION) - BACKGROUND
Patient with chronic anemia, gets outpatient transfusions. Admitted for fevers of unknown origin. Patient received 1 unit of PRBC which finished at 7:30 AM.

## 2023-06-17 NOTE — PROGRESS NOTE ADULT - ASSESSMENT
71 yo Female with PMHx of Myelofibrosis presents for     #Fevers of Unknown Origin  #Hx of Immunosuppression  -Admit to Medicine   -No source of infxn at this time  -BCx and UCx pending  -Tick labs pending   -s/p Cefepime 1g Q12H in ED  -cont Cefepime 2g Q12H IV   -ID recs appreciated    #Anemia  #Myelofibrosis   -s/p 1U RPBC this AM; CBC drawn right after   -ordered another 1U PRBC   -trend H/H   -keep Hgb >7    #Chronic A-fib  -cont dig, bblocker    #DVT PPX  -Xarelto 15mg PO QD    #FULL CODE    Case discussed with Dr Puente

## 2023-06-17 NOTE — PATIENT PROFILE ADULT - FALL HARM RISK - HARM RISK INTERVENTIONS

## 2023-06-17 NOTE — H&P ADULT - ASSESSMENT
A/P:    1.  Fever  h/o Immunosuppression  Elevated Lactate  Hyponatremia  -no definite source of infection so far  -follow clinically and cultures  -started on Empiric broad spectrum antibiotics coverage  -follow ID consult  -got IVF and Getting PRBC in ED  -follow lactate level and Na level     2.  Anemia  MDS  Myelofibrosis  -getting 1 unit PRBC  -follow CBC  -give Transfusion to keep Hb>7  -on Revlimid    3.  h/o A fib  -on Digoxin, BB  -on Xarelto     4.  Eliquis for DVT ppx    5.  Code status:  -Full code

## 2023-06-17 NOTE — PATIENT PROFILE ADULT - CENTRAL VENOUS CATHETER/PICC LINE
Bed: 20  Expected date: 9/1/20  Expected time: 9:21 PM  Means of arrival: [facility]  Comments:  [facility]  Seizure activity     Mearl Pallas, RN  09/01/20 [id number]
Patient eloped     Tawanna Olson RN  09/01/20 [id number]
Pt no longer in her room, Dr. Guerline Monae updated on pt status.       [address] [place] [address]  09/01/20 [id number]
no

## 2023-06-17 NOTE — CONSULT NOTE ADULT - ASSESSMENT
73 y/o Female with h/o Myelofibrosis, anemia, transfusion dependent, COPD, A.fib, kidney stone, CHF was admitted on 6/16 for fever x 3 days PTA. Her fever was as high as 102 F on the day PTA. She came to the ED as she was advised by her Hematologist for evaluation of her fever. She got a PRBC transfusion last Monday. Gets PRBC almost every week. Her Hb is 5.3 in ED. She denies sob, chest pain or any pain. Her Systolic BP was between  at home. In ER she received cefepime.     1. Febrile syndrome. ?cause ?underlying infection ?related to blood transfusion. Pyuria. Probable UTI. MDS. Immunocompromised host.  -obtain BC x 2, urine c/s  -start cefepime 2 gm IV q12h  -reason for abx use and side effects reviewed with patient; monitor BMP  -old chart reviewed to assess prior cultures  -monitor temps  -f/u CBC  -supportive care  2. Other issues:   -care per medicine     73 y/o Female with h/o Myelofibrosis, anemia, transfusion dependent, COPD, A.fib, kidney stone, CHF was admitted on 6/16 for fever x 3 days PTA. Her fever was as high as 102 F on the day PTA. She came to the ED as she was advised by her Hematologist for evaluation of her fever. She got a PRBC transfusion last Monday. Gets PRBC almost every week. Her Hb is 5.3 in ED. She denies sob, chest pain or any pain. Her Systolic BP was between  at home. In ER she received cefepime.     1. Febrile syndrome. ?cause ?underlying infection ?related to blood transfusion. Pyuria. Probable UTI. MDS. Immunocompromised host.  -?fever related to viral illness or tick exposure  -obtain Lyme AB, babesia PCR, ehrlichia PCR, WNV, RMSF PCR  -obtain EBV, CMV, ESR, CRP, LDH  -obtain BC x 2, urine c/s  -start cefepime 2 gm IV q12h  -reason for abx use and side effects reviewed with patient; monitor BMP  -old chart reviewed to assess prior cultures  -monitor temps  -f/u CBC  -supportive care  2. Other issues:   -care per medicine

## 2023-06-17 NOTE — CONSULT NOTE ADULT - SUBJECTIVE AND OBJECTIVE BOX
Patient is a 72y old  Female who presents with a chief complaint of Fever, Myelofibrosis and Anemia     HPI:  71 y/o Female with h/o Myelofibrosis, anemia, transfusion dependent, COPD, A.fib, kidney stone, CHF was admitted on  for fever x 3 days PTA. Her fever was as high as 102 F on the day PTA. She came to the ED as she was advised by her Hematologist for evaluation of her fever. She got a PRBC transfusion last Monday. Gets PRBC almost every week. Her Hb is 5.3 in ED. She denies sob, chest pain or any pain. She took tylenol 2 hours before arriving in ED. She has no active bleeding. Her Systolic BP was between  at home. In ER she received cefepime.     PAST MEDICAL HISTORY:  Anemia   Atrial fibrillation   COPD, mild   CHF   Kidney stone   Myelofibrosis   On home oxygen therapy   Pericardial effusion   PNA (pneumonia).     PAST SURGICAL HISTORY:  No significant past surgical history.     Meds: per reconciliation sheet, noted below  MEDICATIONS  (STANDING):  allopurinol 300 milliGRAM(s) Oral daily  aspirin enteric coated 81 milliGRAM(s) Oral daily  atorvastatin 10 milliGRAM(s) Oral at bedtime  cefepime  Injectable. 1000 milliGRAM(s) IV Push every 12 hours  cholecalciferol 1000 Unit(s) Oral daily  digoxin     Tablet 125 MICROGram(s) Oral daily  lenalidomide 10 milliGRAM(s) Oral daily  metoprolol succinate ER 50 milliGRAM(s) Oral daily  pantoprazole    Tablet 40 milliGRAM(s) Oral before breakfast  rivaroxaban 15 milliGRAM(s) Oral daily    MEDICATIONS  (PRN):  acetaminophen     Tablet .. 650 milliGRAM(s) Oral every 6 hours PRN Temp greater or equal to 38C (100.4F), Mild Pain (1 - 3)  albuterol    90 MICROgram(s) HFA Inhaler 2 Puff(s) Inhalation every 6 hours PRN for shortness of breath and/or wheezing  aluminum hydroxide/magnesium hydroxide/simethicone Suspension 30 milliLiter(s) Oral every 4 hours PRN Dyspepsia  melatonin 3 milliGRAM(s) Oral at bedtime PRN Insomnia  ondansetron Injectable 4 milliGRAM(s) IV Push every 8 hours PRN Nausea and/or Vomiting    Allergies    No Known Allergies    Intolerances      Social: no smoking, no alcohol, no illegal drugs; no recent travel, no exposure to TB  FAMILY HISTORY:  FH: arthritis (Mother)      no history of premature cardiovascular disease in first degree relatives    ROS: the patient denies HA, no seizures, no dizziness, no sore throat, no nasal congestion, no blurry vision, no CP, no palpitations, no SOB, no cough, no abdominal pain, no diarrhea, no N/V, no dysuria, no leg pain, no claudication, no rash, no joint aches, no rectal pain or bleeding, no night sweats, has increased weakness  All other systems reviewed and are negative    Vital Signs Last 24 Hrs  T(C): 36.7 (2023 07:30), Max: 38.3 (2023 03:30)  T(F): 98.1 (2023 07:30), Max: 101 (2023 03:30)  HR: 88 (2023 07:30) (84 - 105)  BP: 90/45 (2023 07:30) (85/41 - 103/54)  BP(mean): 60 (2023 04:48) (55 - 71)  RR: 18 (2023 07:30) (16 - 22)  SpO2: 93% (2023 07:30) (93% - 98%)    Parameters below as of 2023 07:30  Patient On (Oxygen Delivery Method): room air    PE:    Constitutional:  No acute distress  HEENT: NC/AT, EOMI, PERRLA, conjunctivae clear; ears and nose atraumatic; pharynx benign  Neck: supple; thyroid not palpable  Back: no tenderness  Respiratory: respiratory effort normal; clear to auscultation  Cardiovascular: S1S2 regular, no murmurs  Abdomen: soft, not tender, not distended, positive BS; no liver or spleen organomegaly  Genitourinary: no suprapubic tenderness  Lymphatic: no LN palpable  Musculoskeletal: no muscle tenderness, no joint swelling or tenderness  Extremities: no pedal edema  Neurological/ Psychiatric: AxOx3, judgement and insight normal; moving all extremities  Skin: no rashes; no palpable lesions    Labs: all available labs reviewed                        5.1    3.21  )-----------( 343      ( 2023 07:32 )             15.7     06-17    132<L>  |  99  |  47<H>  ----------------------------<  96  3.7   |  25  |  0.95    Ca    6.9<L>      2023 07:32    TPro  5.5<L>  /  Alb  2.7<L>  /  TBili  1.3<H>  /  DBili  x   /  AST  27  /  ALT  34  /  AlkPhos  111       LIVER FUNCTIONS - ( 2023 07:32 )  Alb: 2.7 g/dL / Pro: 5.5 gm/dL / ALK PHOS: 111 U/L / ALT: 34 U/L / AST: 27 U/L / GGT: x           Urinalysis Basic - ( 2023 22:00 )    Color: Yellow / Appearance: Clear / S.010 / pH: x  Gluc: x / Ketone: Negative  / Bili: Negative / Urobili: Negative   Blood: x / Protein: 30 mg/dL / Nitrite: Negative   Leuk Esterase: Small / RBC: 6-10 /HPF / WBC >50 /HPF   Sq Epi: x / Non Sq Epi: x / Bacteria: Occasional    ( @ 20:30)  NotDete      Radiology: all available radiological tests reviewed    < from: Xray Chest 1 View- PORTABLE-Urgent (23 @ 17:07) >  Cardiomegaly. Pulmonary vascular congestion. Increased right base effusion.  < end of copied text >      Advanced directives addressed: full resuscitation Patient is a 72y old  Female who presents with a chief complaint of Fever, Myelofibrosis and Anemia     HPI:  73 y/o Female with h/o Myelofibrosis, anemia, transfusion dependent, COPD, A.fib, kidney stone, CHF was admitted on  for fever x 3 days PTA. Her fever was as high as 102 F on the day PTA. She came to the ED as she was advised by her Hematologist for evaluation of her fever. She got a PRBC transfusion last Monday. Gets PRBC almost every week. Her Hb is 5.3 in ED. She denies sob, chest pain or any pain. She took tylenol 2 hours before arriving in ED. She has no active bleeding. Her Systolic BP was between  at home. In ER she received cefepime.     PAST MEDICAL HISTORY:  Anemia   Atrial fibrillation   COPD, mild   CHF   Kidney stone   Myelofibrosis   On home oxygen therapy   Pericardial effusion   PNA (pneumonia).     PAST SURGICAL HISTORY:  No significant past surgical history.     Meds: per reconciliation sheet, noted below  MEDICATIONS  (STANDING):  allopurinol 300 milliGRAM(s) Oral daily  aspirin enteric coated 81 milliGRAM(s) Oral daily  atorvastatin 10 milliGRAM(s) Oral at bedtime  cefepime  Injectable. 1000 milliGRAM(s) IV Push every 12 hours  cholecalciferol 1000 Unit(s) Oral daily  digoxin     Tablet 125 MICROGram(s) Oral daily  lenalidomide 10 milliGRAM(s) Oral daily  metoprolol succinate ER 50 milliGRAM(s) Oral daily  pantoprazole    Tablet 40 milliGRAM(s) Oral before breakfast  rivaroxaban 15 milliGRAM(s) Oral daily    MEDICATIONS  (PRN):  acetaminophen     Tablet .. 650 milliGRAM(s) Oral every 6 hours PRN Temp greater or equal to 38C (100.4F), Mild Pain (1 - 3)  albuterol    90 MICROgram(s) HFA Inhaler 2 Puff(s) Inhalation every 6 hours PRN for shortness of breath and/or wheezing  aluminum hydroxide/magnesium hydroxide/simethicone Suspension 30 milliLiter(s) Oral every 4 hours PRN Dyspepsia  melatonin 3 milliGRAM(s) Oral at bedtime PRN Insomnia  ondansetron Injectable 4 milliGRAM(s) IV Push every 8 hours PRN Nausea and/or Vomiting    Allergies    No Known Allergies    Intolerances      Social: no smoking, no alcohol, no illegal drugs; no recent travel, no exposure to TB  FAMILY HISTORY:  FH: arthritis (Mother)      no history of premature cardiovascular disease in first degree relatives    ROS: the patient denies HA, no seizures, no dizziness, no sore throat, no nasal congestion, no blurry vision, no CP, no palpitations, no SOB, no cough, no abdominal pain, no diarrhea, no N/V, no dysuria, no leg pain, no claudication, no rash, no joint aches, no rectal pain or bleeding, no night sweats, has increased weakness  All other systems reviewed and are negative    Vital Signs Last 24 Hrs  T(C): 36.7 (2023 07:30), Max: 38.3 (2023 03:30)  T(F): 98.1 (2023 07:30), Max: 101 (2023 03:30)  HR: 88 (2023 07:30) (84 - 105)  BP: 90/45 (2023 07:30) (85/41 - 103/54)  BP(mean): 60 (2023 04:48) (55 - 71)  RR: 18 (2023 07:30) (16 - 22)  SpO2: 93% (2023 07:30) (93% - 98%)    Parameters below as of 2023 07:30  Patient On (Oxygen Delivery Method): room air    PE:    Constitutional:  No acute distress  HEENT: NC/AT, EOMI, PERRLA, conjunctivae clear; ears and nose atraumatic; pharynx benign  Neck: supple; thyroid not palpable  Back: no tenderness  Respiratory: respiratory effort normal; clear to auscultation  Cardiovascular: S1S2 regular, no murmurs  Abdomen: soft, not tender, not distended, positive BS; no liver or spleen organomegaly  Genitourinary: no suprapubic tenderness  Lymphatic: no LN palpable  Musculoskeletal: no muscle tenderness, no joint swelling or tenderness  Extremities: no pedal edema  Neurological/ Psychiatric: AxOx3, judgement and insight normal; moving all extremities  Skin: no rashes; no palpable lesions    Labs: all available labs reviewed                        5.1    3.21  )-----------( 343      ( 2023 07:32 )             15.7     06-17    132<L>  |  99  |  47<H>  ----------------------------<  96  3.7   |  25  |  0.95    Ca    6.9<L>      2023 07:32    TPro  5.5<L>  /  Alb  2.7<L>  /  TBili  1.3<H>  /  DBili  x   /  AST  27  /  ALT  34  /  AlkPhos  111       LIVER FUNCTIONS - ( 2023 07:32 )  Alb: 2.7 g/dL / Pro: 5.5 gm/dL / ALK PHOS: 111 U/L / ALT: 34 U/L / AST: 27 U/L / GGT: x           Urinalysis Basic - ( 2023 22:00 )    Color: Yellow / Appearance: Clear / S.010 / pH: x  Gluc: x / Ketone: Negative  / Bili: Negative / Urobili: Negative   Blood: x / Protein: 30 mg/dL / Nitrite: Negative   Leuk Esterase: Small / RBC: 6-10 /HPF / WBC >50 /HPF   Sq Epi: x / Non Sq Epi: x / Bacteria: Occasional    ( @ 20:30)  NotDete    Radiology: all available radiological tests reviewed    < from: Xray Chest 1 View- PORTABLE-Urgent (23 @ 17:07) >  Cardiomegaly. Pulmonary vascular congestion. Increased right base effusion.  < end of copied text >    Advanced directives addressed: full resuscitation

## 2023-06-17 NOTE — H&P ADULT - NSHPPHYSICALEXAM_GEN_ALL_CORE
T(C): 36.8 (06-16-23 @ 20:10), Max: 36.8 (06-16-23 @ 20:10)  HR: 92 (06-16-23 @ 22:21) (84 - 92)  BP: 95/49 (06-16-23 @ 22:21) (85/41 - 97/58)  RR: 21 (06-16-23 @ 22:10) (16 - 22)  SpO2: 97% (06-16-23 @ 22:10) (97% - 98%)    CONSTITUTIONAL: Well groomed, no apparent distress  EYES: PERRLA and symmetric, EOMI, No conjunctival or scleral injection, non-icteric  ENMT: Oral mucosa with moist membranes. Normal dentition; no pharyngeal injection or exudates             NECK: Supple, symmetric and without tracheal deviation   RESP: No respiratory distress, no use of accessory muscles; CTA b/l, no WRR  CV: RRR, +S1S2, no MRG; no JVD; no peripheral edema  GI: Soft, NT, ND, no rebound, no guarding; no palpable masses; no hepatosplenomegaly; no hernia palpated  LYMPH: No cervical LAD or tenderness;   MSK: Normal gait;  Normal ROM without pain,  normal muscle strength/tone  SKIN: No rashes or ulcers noted;   NEURO: CN II-XII intact; normal reflexes in upper and lower extremities, sensation intact in upper and lower extremities b/l to light touch   PSYCH: Appropriate insight/judgment; A+O x 3, mood and affect appropriate, recent/remote memory intact

## 2023-06-18 ENCOUNTER — TRANSCRIPTION ENCOUNTER (OUTPATIENT)
Age: 73
End: 2023-06-18

## 2023-06-18 LAB
ADD ON TEST-SPECIMEN IN LAB: SIGNIFICANT CHANGE UP
ANION GAP SERPL CALC-SCNC: 3 MMOL/L — LOW (ref 5–17)
ANION GAP SERPL CALC-SCNC: 7 MMOL/L — SIGNIFICANT CHANGE UP (ref 5–17)
BUN SERPL-MCNC: 39 MG/DL — HIGH (ref 7–23)
BUN SERPL-MCNC: 40 MG/DL — HIGH (ref 7–23)
CALCIUM SERPL-MCNC: 7.2 MG/DL — LOW (ref 8.5–10.1)
CALCIUM SERPL-MCNC: 7.5 MG/DL — LOW (ref 8.5–10.1)
CHLORIDE SERPL-SCNC: 101 MMOL/L — SIGNIFICANT CHANGE UP (ref 96–108)
CHLORIDE SERPL-SCNC: 101 MMOL/L — SIGNIFICANT CHANGE UP (ref 96–108)
CO2 SERPL-SCNC: 26 MMOL/L — SIGNIFICANT CHANGE UP (ref 22–31)
CO2 SERPL-SCNC: 28 MMOL/L — SIGNIFICANT CHANGE UP (ref 22–31)
CREAT SERPL-MCNC: 0.86 MG/DL — SIGNIFICANT CHANGE UP (ref 0.5–1.3)
CREAT SERPL-MCNC: 0.86 MG/DL — SIGNIFICANT CHANGE UP (ref 0.5–1.3)
CULTURE RESULTS: SIGNIFICANT CHANGE UP
EGFR: 72 ML/MIN/1.73M2 — SIGNIFICANT CHANGE UP
EGFR: 72 ML/MIN/1.73M2 — SIGNIFICANT CHANGE UP
GLUCOSE SERPL-MCNC: 119 MG/DL — HIGH (ref 70–99)
GLUCOSE SERPL-MCNC: 95 MG/DL — SIGNIFICANT CHANGE UP (ref 70–99)
HCT VFR BLD CALC: 23.6 % — LOW (ref 34.5–45)
HGB BLD-MCNC: 7.8 G/DL — LOW (ref 11.5–15.5)
MAGNESIUM SERPL-MCNC: 1.9 MG/DL — SIGNIFICANT CHANGE UP (ref 1.6–2.6)
MCHC RBC-ENTMCNC: 31.5 PG — SIGNIFICANT CHANGE UP (ref 27–34)
MCHC RBC-ENTMCNC: 33.1 GM/DL — SIGNIFICANT CHANGE UP (ref 32–36)
MCV RBC AUTO: 95.2 FL — SIGNIFICANT CHANGE UP (ref 80–100)
NRBC # BLD: 2 /100 WBCS — HIGH (ref 0–0)
PHOSPHATE SERPL-MCNC: 2.7 MG/DL — SIGNIFICANT CHANGE UP (ref 2.5–4.5)
PLATELET # BLD AUTO: 364 K/UL — SIGNIFICANT CHANGE UP (ref 150–400)
POTASSIUM SERPL-MCNC: 3.4 MMOL/L — LOW (ref 3.5–5.3)
POTASSIUM SERPL-MCNC: 3.8 MMOL/L — SIGNIFICANT CHANGE UP (ref 3.5–5.3)
POTASSIUM SERPL-SCNC: 3.4 MMOL/L — LOW (ref 3.5–5.3)
POTASSIUM SERPL-SCNC: 3.8 MMOL/L — SIGNIFICANT CHANGE UP (ref 3.5–5.3)
RBC # BLD: 2.48 M/UL — LOW (ref 3.8–5.2)
RBC # FLD: 20.7 % — HIGH (ref 10.3–14.5)
SODIUM SERPL-SCNC: 132 MMOL/L — LOW (ref 135–145)
SODIUM SERPL-SCNC: 134 MMOL/L — LOW (ref 135–145)
SPECIMEN SOURCE: SIGNIFICANT CHANGE UP
WBC # BLD: 4.1 K/UL — SIGNIFICANT CHANGE UP (ref 3.8–10.5)
WBC # FLD AUTO: 4.1 K/UL — SIGNIFICANT CHANGE UP (ref 3.8–10.5)

## 2023-06-18 PROCEDURE — 99232 SBSQ HOSP IP/OBS MODERATE 35: CPT | Mod: GC

## 2023-06-18 RX ORDER — POTASSIUM CHLORIDE 20 MEQ
20 PACKET (EA) ORAL ONCE
Refills: 0 | Status: COMPLETED | OUTPATIENT
Start: 2023-06-18 | End: 2023-06-18

## 2023-06-18 RX ORDER — POTASSIUM CHLORIDE 20 MEQ
40 PACKET (EA) ORAL ONCE
Refills: 0 | Status: DISCONTINUED | OUTPATIENT
Start: 2023-06-18 | End: 2023-06-18

## 2023-06-18 RX ORDER — LORATADINE 10 MG/1
10 TABLET ORAL DAILY
Refills: 0 | Status: DISCONTINUED | OUTPATIENT
Start: 2023-06-18 | End: 2023-06-19

## 2023-06-18 RX ORDER — DIPHENHYDRAMINE HCL 50 MG
25 CAPSULE ORAL EVERY 6 HOURS
Refills: 0 | Status: DISCONTINUED | OUTPATIENT
Start: 2023-06-18 | End: 2023-06-19

## 2023-06-18 RX ORDER — DIPHENHYDRAMINE HCL 50 MG
25 CAPSULE ORAL ONCE
Refills: 0 | Status: COMPLETED | OUTPATIENT
Start: 2023-06-18 | End: 2023-06-18

## 2023-06-18 RX ORDER — HYDROCORTISONE 1 %
1 OINTMENT (GRAM) TOPICAL DAILY
Refills: 0 | Status: DISCONTINUED | OUTPATIENT
Start: 2023-06-18 | End: 2023-06-19

## 2023-06-18 RX ADMIN — Medication 25 MILLIGRAM(S): at 21:15

## 2023-06-18 RX ADMIN — Medication 125 MICROGRAM(S): at 10:13

## 2023-06-18 RX ADMIN — CEFEPIME 100 MILLIGRAM(S): 1 INJECTION, POWDER, FOR SOLUTION INTRAMUSCULAR; INTRAVENOUS at 00:17

## 2023-06-18 RX ADMIN — LORATADINE 10 MILLIGRAM(S): 10 TABLET ORAL at 16:56

## 2023-06-18 RX ADMIN — Medication 81 MILLIGRAM(S): at 10:10

## 2023-06-18 RX ADMIN — RIVAROXABAN 15 MILLIGRAM(S): KIT at 07:05

## 2023-06-18 RX ADMIN — Medication 20 MILLIEQUIVALENT(S): at 14:03

## 2023-06-18 RX ADMIN — ATORVASTATIN CALCIUM 10 MILLIGRAM(S): 80 TABLET, FILM COATED ORAL at 21:16

## 2023-06-18 RX ADMIN — Medication 25 MILLIGRAM(S): at 15:23

## 2023-06-18 RX ADMIN — Medication 25 MILLIGRAM(S): at 11:26

## 2023-06-18 RX ADMIN — Medication 300 MILLIGRAM(S): at 10:13

## 2023-06-18 RX ADMIN — Medication 50 MILLIGRAM(S): at 10:13

## 2023-06-18 RX ADMIN — Medication 20 MILLIGRAM(S): at 00:18

## 2023-06-18 RX ADMIN — Medication 1000 UNIT(S): at 10:10

## 2023-06-18 RX ADMIN — PANTOPRAZOLE SODIUM 40 MILLIGRAM(S): 20 TABLET, DELAYED RELEASE ORAL at 07:05

## 2023-06-18 NOTE — PROGRESS NOTE ADULT - ATTENDING COMMENTS
#Fevers of Unknown Origin  #Hx of Immunosuppression  -No source of infxn at this time  -BCx NGTD  - urine culture pending   - tick labs pending   -s/p Cefepime 1g Q12H in ED  - recurrent fever, last episode 6/17 late afternoon at 101.8 s/p transfusion   - ID recs appreciated : dc cefepime on 6/18. CMV/ EBV labs suggests past infection   - hemotology/ oncology recs appreciated: hold REVLIMID

## 2023-06-18 NOTE — PROGRESS NOTE ADULT - ASSESSMENT
71 yo Female with PMHx of Myelofibrosis presents for     #Fevers of Unknown Origin  #Hx of Immunosuppression  -No source of infxn at this time  -BCx NGTD  - urine culture pending   -Tick labs pending   -s/p Cefepime 1g Q12H in ED  -ID recs appreciated : dc cefepime on 6/18. CMV/ EBV labs suggests past infection   - hemotology/ oncology recs appreciated: hold REVLIMID     #Anemia  #Myelofibrosis   -s/p 3u RBC this admission   -trend H/H   -keep Hgb >7    #Chronic A-fib  -cont dig, bblocker    #DVT PPX  -Xarelto 15mg PO QD    #FULL CODE    Case discussed with Dr Puente  71 yo Female with PMHx of Myelofibrosis presents for     #Fevers of Unknown Origin  #Hx of Immunosuppression  -No source of infxn at this time  -BCx NGTD  - urine culture pending   - tick labs pending   -s/p Cefepime 1g Q12H in ED  - recurrent fever, last episode 6/17 late afternoon at 101.8 s/p transfusion   - ID recs appreciated : dc cefepime on 6/18. CMV/ EBV labs suggests past infection   - hemotology/ oncology recs appreciated: hold REVLIMID     #Anemia  #Myelofibrosis   -s/p 3u RBC this admission   -trend H/H   -keep Hgb >7    #Chronic A-fib  -cont dig, bblocker    #DVT PPX  -Xarelto 15mg PO QD    #FULL CODE    Case discussed with Dr Puente

## 2023-06-18 NOTE — PROGRESS NOTE ADULT - ASSESSMENT
71 y/o Female with h/o Myelofibrosis, anemia, transfusion dependent, COPD, A.fib, kidney stone, CHF was admitted on 6/16 for fever x 3 days PTA. Her fever was as high as 102 F on the day PTA. She came to the ED as she was advised by her Hematologist for evaluation of her fever. She got a PRBC transfusion last Monday. Gets PRBC almost every week. Her Hb is 5.3 in ED. She denies sob, chest pain or any pain. Her Systolic BP was between  at home. In ER she received cefepime.     1. Febrile syndrome. ?cause ?underlying infection ?related to blood transfusion. Pyuria. Probable UTI. MDS. Immunocompromised host.  -new rash ?related to possible viral syndrome ?related to cefepime  -?fever related to viral illness or tick exposure  -Lyme AB, ehrlichia PCR, WNV, RMSF PCR collected  -babesia PCR is negative   -EBV, CMV serology suggestive for old infection  -BC x 2, urine c/s noted  -on cefepime 2 gm IV q12h # 2  -no clinical signs of sepsis  -d/c cefepime  -f/u cultures  -monitor temps  -f/u CBC  -supportive care  2. Other issues:   -care per medicine

## 2023-06-18 NOTE — PROGRESS NOTE ADULT - ASSESSMENT
72 year old female with PV MF not presently planning to proceed with BMT per patient now admitted with sespis and fever furhter complicated by acute on chronic anemia. secondary to MF     # MF   - patient has been followed by [facility]   - now presently on REVLIMID 10mg PO D1 -D21   - would hold REVLIMID in the interim     # Sepsis   - will await cultures NGTD   - possible UTI source  - Blood and urine pending   - now on CEFEPIME   - continues to spike temp     # Anemia   - outpatient labs suggestive of chronic anemia   - would recommend transfusion today Hb 5.1   - keep type and screen active    #  ? XARELTO 15mg ? will need to confirm dosing with patient.

## 2023-06-19 ENCOUNTER — TRANSCRIPTION ENCOUNTER (OUTPATIENT)
Age: 73
End: 2023-06-19

## 2023-06-19 VITALS — WEIGHT: 113.1 LBS

## 2023-06-19 LAB
ANION GAP SERPL CALC-SCNC: 4 MMOL/L — LOW (ref 5–17)
BUN SERPL-MCNC: 34 MG/DL — HIGH (ref 7–23)
CALCIUM SERPL-MCNC: 7.4 MG/DL — LOW (ref 8.5–10.1)
CHLORIDE SERPL-SCNC: 105 MMOL/L — SIGNIFICANT CHANGE UP (ref 96–108)
CO2 SERPL-SCNC: 26 MMOL/L — SIGNIFICANT CHANGE UP (ref 22–31)
CREAT SERPL-MCNC: 0.79 MG/DL — SIGNIFICANT CHANGE UP (ref 0.5–1.3)
EGFR: 79 ML/MIN/1.73M2 — SIGNIFICANT CHANGE UP
GLUCOSE SERPL-MCNC: 101 MG/DL — HIGH (ref 70–99)
HCT VFR BLD CALC: 24.3 % — LOW (ref 34.5–45)
HGB BLD-MCNC: 7.7 G/DL — LOW (ref 11.5–15.5)
MCHC RBC-ENTMCNC: 30.7 PG — SIGNIFICANT CHANGE UP (ref 27–34)
MCHC RBC-ENTMCNC: 31.7 GM/DL — LOW (ref 32–36)
MCV RBC AUTO: 96.8 FL — SIGNIFICANT CHANGE UP (ref 80–100)
PLATELET # BLD AUTO: 397 K/UL — SIGNIFICANT CHANGE UP (ref 150–400)
POTASSIUM SERPL-MCNC: 4.1 MMOL/L — SIGNIFICANT CHANGE UP (ref 3.5–5.3)
POTASSIUM SERPL-SCNC: 4.1 MMOL/L — SIGNIFICANT CHANGE UP (ref 3.5–5.3)
R RICKETTSI AB SER-ACNC: NEGATIVE — SIGNIFICANT CHANGE UP
R RICKETTSI IGM SER-ACNC: 0.61 INDEX — SIGNIFICANT CHANGE UP (ref 0–0.89)
RBC # BLD: 2.51 M/UL — LOW (ref 3.8–5.2)
RBC # FLD: 20.9 % — HIGH (ref 10.3–14.5)
RICK SF IGG TITR SER IF: NEGATIVE — SIGNIFICANT CHANGE UP
RICK SF IGM TITR SER IF: 0.61 INDEX — SIGNIFICANT CHANGE UP (ref 0–0.89)
SODIUM SERPL-SCNC: 135 MMOL/L — SIGNIFICANT CHANGE UP (ref 135–145)
WBC # BLD: 5.39 K/UL — SIGNIFICANT CHANGE UP (ref 3.8–10.5)
WBC # FLD AUTO: 5.39 K/UL — SIGNIFICANT CHANGE UP (ref 3.8–10.5)
WNV IGG TITR FLD: NEGATIVE — SIGNIFICANT CHANGE UP
WNV IGM SPEC QL: NEGATIVE — SIGNIFICANT CHANGE UP

## 2023-06-19 PROCEDURE — 99232 SBSQ HOSP IP/OBS MODERATE 35: CPT | Mod: GC

## 2023-06-19 RX ORDER — LORATADINE 10 MG/1
10 TABLET ORAL
Refills: 0 | Status: DISCONTINUED | OUTPATIENT
Start: 2023-06-19 | End: 2023-06-19

## 2023-06-19 RX ORDER — LANOLIN ALCOHOL/MO/W.PET/CERES
1 CREAM (GRAM) TOPICAL
Qty: 0 | Refills: 0 | DISCHARGE
Start: 2023-06-19

## 2023-06-19 RX ORDER — HYDROCORTISONE 1 %
1 OINTMENT (GRAM) TOPICAL
Qty: 0 | Refills: 0 | DISCHARGE
Start: 2023-06-19

## 2023-06-19 RX ORDER — LENALIDOMIDE 5 MG/1
1 CAPSULE ORAL
Refills: 0 | DISCHARGE

## 2023-06-19 RX ORDER — LORATADINE 10 MG/1
1 TABLET ORAL
Qty: 0 | Refills: 0 | DISCHARGE
Start: 2023-06-19

## 2023-06-19 RX ADMIN — LORATADINE 10 MILLIGRAM(S): 10 TABLET ORAL at 09:58

## 2023-06-19 RX ADMIN — Medication 1000 UNIT(S): at 09:58

## 2023-06-19 RX ADMIN — Medication 300 MILLIGRAM(S): at 09:57

## 2023-06-19 RX ADMIN — Medication 125 MICROGRAM(S): at 09:57

## 2023-06-19 RX ADMIN — Medication 81 MILLIGRAM(S): at 09:57

## 2023-06-19 RX ADMIN — Medication 25 MILLIGRAM(S): at 14:34

## 2023-06-19 RX ADMIN — Medication 25 MILLIGRAM(S): at 07:59

## 2023-06-19 RX ADMIN — Medication 50 MILLIGRAM(S): at 09:57

## 2023-06-19 RX ADMIN — RIVAROXABAN 15 MILLIGRAM(S): KIT at 06:01

## 2023-06-19 RX ADMIN — PANTOPRAZOLE SODIUM 40 MILLIGRAM(S): 20 TABLET, DELAYED RELEASE ORAL at 06:01

## 2023-06-19 NOTE — DISCHARGE NOTE NURSING/CASE MANAGEMENT/SOCIAL WORK - PATIENT PORTAL LINK FT
You can access the FollowMyHealth Patient Portal offered by [facility] by registering at the following website: [email or web address]. By joining [facility]’s FollowMyHealth portal, you will also be able to view your health information using other applications (apps) compatible with our system.

## 2023-06-19 NOTE — DIETITIAN INITIAL EVALUATION ADULT - PERTINENT MEDS FT
MEDICATIONS  (STANDING):  allopurinol 300 milliGRAM(s) Oral daily  aspirin enteric coated 81 milliGRAM(s) Oral daily  atorvastatin 10 milliGRAM(s) Oral at bedtime  cholecalciferol 1000 Unit(s) Oral daily  digoxin     Tablet 125 MICROGram(s) Oral daily  hydrocortisone 1% Cream 1 Application(s) Topical daily  loratadine 10 milliGRAM(s) Oral two times a day  metoprolol succinate ER 50 milliGRAM(s) Oral daily  pantoprazole    Tablet 40 milliGRAM(s) Oral before breakfast  rivaroxaban 15 milliGRAM(s) Oral daily    MEDICATIONS  (PRN):  acetaminophen     Tablet .. 650 milliGRAM(s) Oral every 6 hours PRN Temp greater or equal to 38C (100.4F), Mild Pain (1 - 3)  albuterol    90 MICROgram(s) HFA Inhaler 2 Puff(s) Inhalation every 6 hours PRN for shortness of breath and/or wheezing  aluminum hydroxide/magnesium hydroxide/simethicone Suspension 30 milliLiter(s) Oral every 4 hours PRN Dyspepsia  diphenhydrAMINE 25 milliGRAM(s) Oral every 6 hours PRN Rash and/or Itching  melatonin 3 milliGRAM(s) Oral at bedtime PRN Insomnia  ondansetron Injectable 4 milliGRAM(s) IV Push every 8 hours PRN Nausea and/or Vomiting

## 2023-06-19 NOTE — DISCHARGE NOTE PROVIDER - NSDCCPCAREPLAN_GEN_ALL_CORE_FT
PRINCIPAL DISCHARGE DIAGNOSIS  Diagnosis: Fever  Assessment and Plan of Treatment: You were admitted for fevers of unknown origin. Infectious disease, hematology were consulted. Blood culture, urine culture shows no growth. Ehrlichia PCR, WNV, RMSF PCR collected, results pending. Babesia PCR, lyme labs negative. EBV, CMV serology suggestive for old infection. you were given cefepime but then stopped since no indication, and with rash after cefepime per patient. Rash is improving with antihistamine and hydrocortisone cream.   Your fever can be associated with the use of revlimid and you will need to follow up with her primary care provider and hematologist/ oncologist for further eval and management.   You can take loraditine 10mg twice a day and continue to use hydrocortisone cream for your rash.         SECONDARY DISCHARGE DIAGNOSES  Diagnosis: Anemia  Assessment and Plan of Treatment: You have chronic anemia due to your past medical history of myelofibrosis, with hb 5.3 on admission, you were given 3 units of packed red blood cell this hospitalization and hemoglobin level has been stable since. Per hematology/ oncology: hold revlimid. You will need to follow up with her primary care provider and hematologist/ oncologist for further eval and management.

## 2023-06-19 NOTE — DISCHARGE NOTE PROVIDER - DETAILS OF MALNUTRITION DIAGNOSIS/DIAGNOSES
This patient has been assessed with a concern for Malnutrition and was treated during this hospitalization for the following Nutrition diagnosis/diagnoses:     -  06/19/2023: Severe protein-calorie malnutrition

## 2023-06-19 NOTE — DIETITIAN INITIAL EVALUATION ADULT - OTHER INFO
73 y/o Female with PMH of Myelofibrosis started to have fever 3 days ago. Her fever was as high as 102 F yesterday. She came to the ED as she was advised by her Hematologist for evaluation of her fever. She got a PRBC transfusion last Monday. She is chronically anemic due MDS. Gets PRBC almost every week.     Pt known to RD service, met criteria for PCM on previous admission (4/10/23). Endorses continued good appetite since admission, on DASH diet currently. UBW stated at 110#, stable wt x >3 years as per pt. Wt hx as per chart: 108# (taken by RD on 4/10/23). Bed scale wt of 113# taken by RD on 6/19, 5# wt gain x 2 months. Appeared thin, frail, and kiersten upon assessment. NFPE reveals moderate to severe muscle/fat wasting. Will trial Ensure + HP shake BID to optimize nutritional needs (provides 350 kcal, 20g protein/ shake) - pt receptive. See recommendations below.

## 2023-06-19 NOTE — DIETITIAN INITIAL EVALUATION ADULT - FACTORS AFF FOOD INTAKE
"Neuro Critical Care Transfer of Care note    Date of Admit: 2019  Date of Transfer / Stepdown: 2019    Brief History of Present Illness:      History of Present Illness:   Transfer from [facility]:  Pt is an88 yo m, h/o CAD MI x4 (STEMI in March, on ASA/plavix), HTN, v fib with ICD and pacemaker, a fib on eliquis, Multiple TIAs, last one year ago severe debility (uses walker and wheelchair at baseline), s/p fall last night and early this am.while trying to walk to bathroom.  Has had 2-3 falls in past 2 days bc "legs give out" which happens often for pt but more frequently over past week. Also, He was recently prescribed flexiril for shoulder pain. Family noticed  an increase in falls over past 3-4 days.   Of note,Spouse  within past 2 weeks.  Seen at [place] overnight, extensive work-up including CT head which showed small subdural.  [place] [facility] [place] Pt neurologically stable, disorientation to time and place. No immediate neurosurgical intervention necessary at present.   Decision made to not give platelets. No reversal of anticoagulation. Repeat CTH after 6h revealed R tentorial SDH 1.2 cm thickness, stable, no increase or new bleed. CT C-spine showed no acute fractures; 1cm lucent lesion at C7; cervical spondylosis. Pt will be admitted to [facility] over night for close neurological monitoring        Hospital Course:  Admit to [facility]. Repeat CTH and C-spine. SBP goal <160. No platelets. Hold anticoagulation.  2019 CTH repeated overnight for agitation which demonstrated mildly enlarged bleed; this morning Mr Bell is neurologically back to baseline; CTH repeated at 11 AM shows stable bleed.   2019: ON given bolus, for hypotension; off precedex; more interactive; used home cpap at night. PM CTH from yesterday with stable bleed. observe vitals until PM can be TTF after;     Hospital Course By Problem with Pertinent Findings:     Neuro  Subdural hematoma  Admit to " [facility] s/p fall x2, R SDH  8/12  Repeat CTH after 6h revealed R tentorial SDH 1.2 cm thickness, stable, no increase or new bleed.   NSGY consulted, no immediate neurosurgical  Intervention necessary at present  Neuro checks & VS q1h  SBPgoal <160  Hold anticoagulation and antiplatelets; repeat CTH at 11am  Should not be restarted on triple therapy because of high risk of bleeding  Vimpat for Sz PPx X 7 days  PT/OT/SLP  Ok to start chemical dvt PPx if AM CTH stable tomorrow        Cardiac/Vascular  Ventricular fibrillation  Has AICD  Will not do interrogation as fall was mechanical and he did not have any prodromal symptoms     Ischemic cardiomyopathy  see chronic CHF, ASHD  Has AICD  Echo as above  Lasix (held today due to episode of low MAP overnight)        Hypertension  SBP goal <160  Continue coreg 12.5mg bid  Lasix 20mg daily (holding today due to episode of hypotension)  sacubitril- valsartan 24-26 bid  Prn hydralazine, labetalol        Hyperlipidemia  Continue crestor 5mg every Monday and Wednesday    ezetimbe 10mg daily     STEMI (ST elevation myocardial infarction)  Hx of MI x4  Last MI 4/2019  Hx of multiple stents.  4 placed 4/2019  ASA and plavix on hold for 48h 2/2 SDH  Will restart ASA once stable from bleeding perspective (target one week post bleed); should not be restarted on triple therapy given significant risk of bleeding     Chronic CHF  Continue sacubitril-valsartan 24-26mg bid   coreg 12.5mg bid  Furosemide 20mg daily (hold today's dose because of episode of hypotension)  Echo repeated,  Normal ef     ASCVD (arteriosclerotic cardiovascular disease)  Continue ezetembe 10mg daily  Crestor 5mg every Monday, & Wednesday  Ranolazine SR 1000mg bid  NTG SL prn chest pain  ECG pending  Echo w normal EF  Hold ASA, plavix for 48h, 2/2 SDH  Was on triple therapy - DAPT and Eliquis; holding all for now; will restart ASA at some point; would not continue triple therapy given high risk of bleeding        Hx of  MI x4 with multiple stents. Most recent MI 4/2019     Renal/  Stage 3 chronic kidney disease  Monitor daily CMP, BUN/Cr 27/1.5 on admit   Monitor I& O  Monitor urine output     Benign prostatic hyperplasia  home tamulosin 0.4mg daily     Other  Impaired mobility and activities of daily living  PT/OT to treat and evaluate     Sleep apnea  Continue CPAP  QHS     Consider discussing with cardiologist risk/benefit of triple therapy, we do not recommend triple therapy from neurological perspective.     Consultants and Procedures:     Consultants:  nsgy    Procedures:    none    Transfer Information:     Diet:  cardiac    Physical Activity:  As tolerated      To Do / Pending Studies / Follow ups:      Tyrone Bustamante  Neuro Crtical Care   persistent lack of appetite

## 2023-06-19 NOTE — DISCHARGE NOTE NURSING/CASE MANAGEMENT/SOCIAL WORK - NSDCPEFALRISK_GEN_ALL_CORE
For information on Fall & Injury Prevention, visit: [email or web address] OR  [email or web address] OR  https://www.cdc.gov/steadi/patient.html

## 2023-06-19 NOTE — PROGRESS NOTE ADULT - ASSESSMENT
73 y/o Female with h/o Myelofibrosis, anemia, transfusion dependent, COPD, A.fib, kidney stone, CHF was admitted on 6/16 for fever x 3 days PTA. Her fever was as high as 102 F on the day PTA. She came to the ED as she was advised by her Hematologist for evaluation of her fever. She got a PRBC transfusion last Monday. Gets PRBC almost every week. Her Hb is 5.3 in ED. She denies sob, chest pain or any pain. Her Systolic BP was between  at home. In ER she received cefepime.     1. Febrile syndrome resolving. Pyuria. Probable UTI. MDS. Immunocompromised host.  -new rash is improving, pauletteley allergic rash  -?fever related to viral illness or tick exposure  -Lyme AB, ehrlichia PCR, WNV, RMSF PCR collected  -babesia PCR is negative   -EBV, CMV serology suggestive for old infection  -BC x 2, urine c/s noted  -s/p cefepime 2 gm IV q12h # 2  -no clinical signs of sepsis  -continue to observe off abx therapy  -f/u cultures  -monitor temps  -f/u CBC  -supportive care  2. Other issues:   -care per medicine

## 2023-06-19 NOTE — DIETITIAN INITIAL EVALUATION ADULT - PERTINENT LABORATORY DATA
06-19    135  |  105  |  34<H>  ----------------------------<  101<H>  4.1   |  26  |  0.79    Ca    7.4<L>      19 Jun 2023 07:13  Phos  2.7     06-18  Mg     1.9     06-18    A1C with Estimated Average Glucose Result: 5.1 % (04-09-23 @ 07:23)  A1C with Estimated Average Glucose Result: 5.4 % (03-16-23 @ 07:03)

## 2023-06-19 NOTE — DISCHARGE NOTE PROVIDER - HOSPITAL COURSE
71 yo Female with PMHx of Myelofibrosis started to have fever 3 days ago. Her fever was as high as 102 F yesterday. She came to the ED as she was advised by her Hematologist for evaluation of her fever. She got a PRBC trasnfusion last Monday. She is chronically anemic due MDS. Gets PRBC almost every week. Her Hb is 5.3 in ED today. She denies sob, chest pain or any pain. She took tylenol 2 hours before arriving in ED. She has no active bleeding. Her Systolic BP is between  at home.    Pt is admitted for Fevers of Unknown Origin. Infectious disease, hematology were consulted. Blood culture, urine culture shows no growth. Ehrlichia PCR, WNV, RMSF PCR collected, results pending. Babesia PCR, lyme labs negative. EBV, CMV serology suggestive for old infection. Pt was given cefepime but then stopped since no indication, and with rash after cefepime per patient. Rash is improving with antihistamine and hydrocortisone cream.   Pt's fever can be associated with the use of revlimid and pt will need to follow up with her primary care provider and hematologist/ oncologist for further eval and management.     Pt also has chronic anemia 2/2 myelofibrosis, with hb 5.3 on admission, pt was given 3 units of pRBC this hospitalization and hb level has been stable since. Per hematology/ oncology: hold revlimid. Pt will need to follow up with her primary care provider and hematologist/ oncologist for further eval and management.     Subjective: Patient was seen and examined by me this morning at bedside.     REVIEW OF SYSTEMS:  CONSTITUTIONAL: No weakness, fevers or chills  EYES/ENT: No visual changes;  No vertigo or throat pain   NECK: No pain or stiffness  RESPIRATORY: No cough, wheezing, hemoptysis; No shortness of breath  CARDIOVASCULAR: No chest pain or palpitations  GASTROINTESTINAL: No abdominal or epigastric pain. No nausea, vomiting; No diarrhea or constipation.   GENITOURINARY: No dysuria, frequency or hematuria  NEUROLOGICAL: No numbness or weakness  SKIN: No itching, burning, rashes, or lesions     PHYSICAL EXAM:  Vital Signs Last 24 Hrs  T(C): 36.5 (19 Jun 2023 08:28), Max: 36.9 (19 Jun 2023 00:18)  T(F): 97.7 (19 Jun 2023 08:28), Max: 98.4 (19 Jun 2023 00:18)  HR: 86 (19 Jun 2023 08:28) (86 - 88)  BP: 100/55 (19 Jun 2023 08:28) (93/43 - 100/55)  BP(mean): --  RR: 18 (19 Jun 2023 08:28) (18 - 18)  SpO2: 95% (19 Jun 2023 08:28) (94% - 95%)    Parameters below as of 19 Jun 2023 08:28  Patient On (Oxygen Delivery Method): room air        Constitutional: Pt lying in bed, awake and alert, NAD  HEENT: EOMI, normal hearing, moist mucous membranes  Neck: Soft and supple, no JVD  Respiratory: CTABL, No wheezing, rales or rhonchi  Cardiovascular: S1S2+, RRR, no M/G/R  Gastrointestinal: BS+, soft, NT/ND, no guarding, no rebound  Extremities: No peripheral edema  Vascular: 2+ peripheral pulses  Neurological: AAOx3, no focal deficits  Musculoskeletal: 5/5 strength b/l upper and lower extremities  Skin: No rashes

## 2023-06-19 NOTE — DISCHARGE NOTE PROVIDER - PROVIDER TOKENS
PROVIDER:[TOKEN:[[id number]],FOLLOWUP:[1-3 days],ESTABLISHEDPATIENT:[T]],PROVIDER:[TOKEN:[[id number]],FOLLOWUP:[1 week],ESTABLISHEDPATIENT:[T]]

## 2023-06-19 NOTE — DIETITIAN INITIAL EVALUATION ADULT - ADD RECOMMEND
1) Liberalize diet to regular to maximize caloric and nutrient intake.  2) Provide Ensure + HP shake BID to optimize nutritional needs (provides 350 kcal, 20g protein/ shake)   3) Encourage protein-rich foods, maximize food preferences  4) Monitor bowel movements, if no BM for >3 days, consider implementing bowel regimen.  5) MVI w/ minerals daily to ensure 100% RDA met  6) Consider adding thiamine 100 mg daily 2/2 malnutrition   7) Obtain weekly wt to track/trend changes  8) Confirm goals of care regarding nutrition support   RD will continue to monitor PO intake, labs, hydration, and wt prn.

## 2023-06-19 NOTE — PROGRESS NOTE ADULT - REASON FOR ADMISSION
Fever, Myelofibrosis and Anemia

## 2023-06-19 NOTE — DISCHARGE NOTE PROVIDER - NSDCMRMEDTOKEN_GEN_ALL_CORE_FT
Albuterol (Eqv-ProAir HFA) 90 mcg/inh inhalation aerosol: 2 puff(s) inhaled every 6 hours as needed for  shortness of breath and/or wheezing  allopurinol 300 mg oral tablet: 1 orally once a day  Anoro Ellipta 62.5 mcg-25 mcg/inh inhalation powder: 1 puff(s) inhaled once a day  Aspir 81 oral delayed release tablet: 1 tab(s) orally once a day  atorvastatin 10 mg oral tablet: 1 tab(s) orally once a day (at bedtime)  bumetanide 1 mg oral tablet: 1 tab(s) orally 2 times a day  cholecalciferol 25 mcg (1000 intl units) oral tablet: 1 tab(s) orally once a day  digoxin 125 mcg (0.125 mg) oral tablet: 1 tab(s) orally once a day  hydrocortisone 1% topical cream: 1 Apply topically to affected area once a day  loratadine 10 mg oral tablet: 1 tab(s) orally 2 times a day  melatonin 3 mg oral tablet: 1 tab(s) orally once a day (at bedtime) As needed Insomnia  Metoprolol Succinate ER 50 mg oral tablet, extended release: 1 tab(s) orally once a day  omeprazole 20 mg oral delayed release capsule: 1 tab(s) orally once a day  rivaroxaban 15 mg oral tablet: 1 tab(s) orally once a day (in the evening)

## 2023-06-19 NOTE — PROGRESS NOTE ADULT - ASSESSMENT
72 year old female with PV MF not presently planning to proceed with BMT per patient now admitted with sepsis and fever further complicated by acute on chronic anemia. secondary to MF     # MF   - patient has been followed by [facility]   - now presently on REVLIMID 10mg PO D1 -D21   - would hold REVLIMID in the interim   - anemia 2/2 MF- s/p 3u pRBC during admission- Hb stable 7.7 today   - pt has appt outpatient with [facility] Dr. Ernesto Collado this week for f/u of Hb- has been getting transfused every 1-2 weeks outpatient     # Sepsis   - urine culture negative   - Blood cx negative, pt afebrile last 24 hrs    - pt received cefepime/vanc with mild rash     current on XARELTO 15mg qd will need to confirm dosing with patient.     dispo planning- now that pt afebrile, Hb stable

## 2023-06-19 NOTE — PROGRESS NOTE ADULT - SUBJECTIVE AND OBJECTIVE BOX
INTERVAL HPI/OVERNIGHT EVENTS:  Patient S&E at bedside. No o/n events,   pt afebrile last 24 hrs   BP 93/43, Hb 7.8 yesterday after total of 3u transfused since start of admission   rash likely 2/2 antibiotic now improving    PAST MEDICAL & SURGICAL HISTORY:  Myelofibrosis      PNA (pneumonia)      Anemia      Atrial fibrillation      Pericardial effusion      Kidney stone      COPD, mild      H/O CHF      On home oxygen therapy      No significant past surgical history          FAMILY HISTORY:  FH: arthritis (Mother)        VITAL SIGNS:  T(F): 97.7 (06-19-23 @ 08:28)  HR: 86 (06-19-23 @ 08:28)  BP: 100/55 (06-19-23 @ 08:28)  RR: 18 (06-19-23 @ 08:28)  SpO2: 95% (06-19-23 @ 08:28)  Wt(kg): --    PHYSICAL EXAM:    Constitutional: NAD  Eyes: EOMI,  Respiratory: CTA b/l  Cardiovascular: RRR, no M/R/G  Gastrointestinal: soft, NTND, splenomegaly  Extremities: no c/c/e, mild rash on chest  Neurological: AAOx3      MEDICATIONS  (STANDING):  allopurinol 300 milliGRAM(s) Oral daily  aspirin enteric coated 81 milliGRAM(s) Oral daily  atorvastatin 10 milliGRAM(s) Oral at bedtime  cholecalciferol 1000 Unit(s) Oral daily  digoxin     Tablet 125 MICROGram(s) Oral daily  hydrocortisone 1% Cream 1 Application(s) Topical daily  loratadine 10 milliGRAM(s) Oral daily  metoprolol succinate ER 50 milliGRAM(s) Oral daily  pantoprazole    Tablet 40 milliGRAM(s) Oral before breakfast  rivaroxaban 15 milliGRAM(s) Oral daily    MEDICATIONS  (PRN):  acetaminophen     Tablet .. 650 milliGRAM(s) Oral every 6 hours PRN Temp greater or equal to 38C (100.4F), Mild Pain (1 - 3)  albuterol    90 MICROgram(s) HFA Inhaler 2 Puff(s) Inhalation every 6 hours PRN for shortness of breath and/or wheezing  aluminum hydroxide/magnesium hydroxide/simethicone Suspension 30 milliLiter(s) Oral every 4 hours PRN Dyspepsia  diphenhydrAMINE 25 milliGRAM(s) Oral every 6 hours PRN Rash and/or Itching  melatonin 3 milliGRAM(s) Oral at bedtime PRN Insomnia  ondansetron Injectable 4 milliGRAM(s) IV Push every 8 hours PRN Nausea and/or Vomiting      Allergies    No Known Allergies    Intolerances        LABS:                        7.7    5.39  )-----------( 397      ( 19 Jun 2023 07:13 )             24.3     06-19    135  |  105  |  34<H>  ----------------------------<  101<H>  4.1   |  26  |  0.79    Ca    7.4<L>      19 Jun 2023 07:13  Phos  2.7     06-18  Mg     1.9     06-18            RADIOLOGY & ADDITIONAL TESTS:  Studies reviewed.  
71 yo Female with PMHx of Myelofibrosis started to have fever 3 days ago. Her fever was as high as 102 F yesterday. She came to the ED as she was advised by her Hematologist for evaluation of her fever. She got a PRBC trasnfusion last Monday. She is chronically anemic due MDS. Gets PRBC almost every week. Her Hb is 5.3 in ED today. She denies sob, chest pain or any pain. She took tylenol 2 hours before arriving in ED. She has no active bleeding. Her Systolic BP is between  at home.     Subjective  pt seen and evaluated at bedside this AM. NAD. denies fevers/chills, pain, cough, shortness of breath, nausea/vomiting.      REVIEW OF SYSTEMS:  CONSTITUTIONAL: No weakness, fevers or chills  EYES/ENT: No visual changes;  No vertigo or throat pain   NECK: No pain or stiffness  RESPIRATORY: No cough, wheezing, hemoptysis; No shortness of breath  CARDIOVASCULAR: No chest pain or palpitations  GASTROINTESTINAL: No abdominal or epigastric pain. No nausea, vomiting, or hematemesis; No diarrhea or constipation. No melena or hematochezia.  GENITOURINARY: No dysuria, frequency or hematuria  NEUROLOGICAL: No numbness or weakness  SKIN: No itching, burning, rashes, or lesions   All other review of systems is negative unless indicated above    PHYSICAL EXAM:    Vital Signs Last 24 Hrs  T(C): 37.6 (18 Jun 2023 07:43), Max: 38.8 (17 Jun 2023 17:42)  T(F): 99.7 (18 Jun 2023 07:43), Max: 101.8 (17 Jun 2023 17:42)  HR: 98 (18 Jun 2023 10:13) (78 - 100)  BP: 110/54 (18 Jun 2023 10:13) (92/44 - 110/54)  BP(mean): --  RR: 17 (18 Jun 2023 07:43) (16 - 17)  SpO2: 99% (18 Jun 2023 07:43) (90% - 99%)    Parameters below as of 18 Jun 2023 07:43  Patient On (Oxygen Delivery Method): room air        Constitutional: Pt lying in bed, awake and alert, NAD  HEENT: EOMI, normal hearing, moist mucous membranes  Neck: Soft and supple, no JVD  Respiratory: CTABL, No wheezing, rales or rhonchi  Cardiovascular: S1S2+, RRR, no M/G/R  Gastrointestinal: BS+, soft, NT/ND, no guarding, no rebound  Extremities: No peripheral edema  Vascular: 2+ peripheral pulses  Neurological: AAOx3, no focal deficits  Musculoskeletal: 5/5 strength b/l upper and lower extremities  Skin: No rashes    MEDICATIONS  (STANDING):  allopurinol 300 milliGRAM(s) Oral daily  aspirin enteric coated 81 milliGRAM(s) Oral daily  atorvastatin 10 milliGRAM(s) Oral at bedtime  cholecalciferol 1000 Unit(s) Oral daily  digoxin     Tablet 125 MICROGram(s) Oral daily  metoprolol succinate ER 50 milliGRAM(s) Oral daily  pantoprazole    Tablet 40 milliGRAM(s) Oral before breakfast  rivaroxaban 15 milliGRAM(s) Oral daily    MEDICATIONS  (PRN):  acetaminophen     Tablet .. 650 milliGRAM(s) Oral every 6 hours PRN Temp greater or equal to 38C (100.4F), Mild Pain (1 - 3)  albuterol    90 MICROgram(s) HFA Inhaler 2 Puff(s) Inhalation every 6 hours PRN for shortness of breath and/or wheezing  aluminum hydroxide/magnesium hydroxide/simethicone Suspension 30 milliLiter(s) Oral every 4 hours PRN Dyspepsia  diphenhydrAMINE 25 milliGRAM(s) Oral every 6 hours PRN Rash and/or Itching  melatonin 3 milliGRAM(s) Oral at bedtime PRN Insomnia  ondansetron Injectable 4 milliGRAM(s) IV Push every 8 hours PRN Nausea and/or Vomiting      LABS: All Labs Reviewed:                                   7.8    4.10  )-----------( 364      ( 18 Jun 2023 07:58 )             23.6           Blood Culture:   06-18    134<L>  |  101  |  39<H>  ----------------------------<  119<H>  3.4<L>   |  26  |  0.86    Ca    7.2<L>      18 Jun 2023 11:24    TPro  5.5<L>  /  Alb  2.7<L>  /  TBili  1.3<H>  /  DBili  x   /  AST  27  /  ALT  34  /  AlkPhos  111  06-17  I&O's Summary    16 Jun 2023 07:01  -  17 Jun 2023 07:00  --------------------------------------------------------  IN: 0 mL / OUT: 250 mL / NET: -250 mL      CAPILLARY BLOOD GLUCOSE          RADIOLOGY/EKG:
71 yo Female with PMHx of Myelofibrosis started to have fever 3 days ago. Her fever was as high as 102 F yesterday. She came to the ED as she was advised by her Hematologist for evaluation of her fever. She got a PRBC trasnfusion last Monday. She is chronically anemic due MDS. Gets PRBC almost every week. Her Hb is 5.3 in ED today. She denies sob, chest pain or any pain. She took tylenol 2 hours before arriving in ED. She has no active bleeding. Her Systolic BP is between  at home.     Subjective  pt seen and evaluated at bedside this AM. NAD. denies fevers/chills, pain, cough, shortness of breath, nausea/vomiting.      REVIEW OF SYSTEMS:  CONSTITUTIONAL: No weakness, fevers or chills  EYES/ENT: No visual changes;  No vertigo or throat pain   NECK: No pain or stiffness  RESPIRATORY: No cough, wheezing, hemoptysis; No shortness of breath  CARDIOVASCULAR: No chest pain or palpitations  GASTROINTESTINAL: No abdominal or epigastric pain. No nausea, vomiting, or hematemesis; No diarrhea or constipation. No melena or hematochezia.  GENITOURINARY: No dysuria, frequency or hematuria  NEUROLOGICAL: No numbness or weakness  SKIN: No itching, burning, rashes, or lesions   All other review of systems is negative unless indicated above    PHYSICAL EXAM:  Vital Signs Last 24 Hrs  T(C): 36.7 (17 Jun 2023 07:30), Max: 38.3 (17 Jun 2023 03:30)  T(F): 98.1 (17 Jun 2023 07:30), Max: 101 (17 Jun 2023 03:30)  HR: 88 (17 Jun 2023 07:30) (84 - 105)  BP: 90/45 (17 Jun 2023 07:30) (85/41 - 103/54)  BP(mean): 60 (17 Jun 2023 04:48) (55 - 71)  RR: 18 (17 Jun 2023 07:30) (16 - 22)  SpO2: 93% (17 Jun 2023 07:30) (93% - 98%)    Parameters below as of 17 Jun 2023 07:30  Patient On (Oxygen Delivery Method): room air        Constitutional: Pt lying in bed, awake and alert, NAD  HEENT: EOMI, normal hearing, moist mucous membranes  Neck: Soft and supple, no JVD  Respiratory: CTABL, No wheezing, rales or rhonchi  Cardiovascular: S1S2+, RRR, no M/G/R  Gastrointestinal: BS+, soft, NT/ND, no guarding, no rebound  Extremities: No peripheral edema  Vascular: 2+ peripheral pulses  Neurological: AAOx3, no focal deficits  Musculoskeletal: 5/5 strength b/l upper and lower extremities  Skin: No rashes    MEDICATIONS:  MEDICATIONS  (STANDING):  allopurinol 300 milliGRAM(s) Oral daily  aspirin enteric coated 81 milliGRAM(s) Oral daily  atorvastatin 10 milliGRAM(s) Oral at bedtime  cefepime   IVPB 2000 milliGRAM(s) IV Intermittent every 12 hours  cholecalciferol 1000 Unit(s) Oral daily  digoxin     Tablet 125 MICROGram(s) Oral daily  lenalidomide 10 milliGRAM(s) Oral daily  metoprolol succinate ER 50 milliGRAM(s) Oral daily  pantoprazole    Tablet 40 milliGRAM(s) Oral before breakfast  rivaroxaban 15 milliGRAM(s) Oral daily      LABS: All Labs Reviewed:                        5.1    3.21  )-----------( 343      ( 17 Jun 2023 07:32 )             15.7     06-17    132<L>  |  99  |  47<H>  ----------------------------<  96  3.7   |  25  |  0.95    Ca    6.9<L>      17 Jun 2023 07:32    TPro  5.5<L>  /  Alb  2.7<L>  /  TBili  1.3<H>  /  DBili  x   /  AST  27  /  ALT  34  /  AlkPhos  111  06-17    PT/INR - ( 16 Jun 2023 20:30 )   PT: 33.5 sec;   INR: 2.86 ratio         PTT - ( 16 Jun 2023 20:30 )  PTT:42.5 sec      Blood Culture:   I&O's Summary    16 Jun 2023 07:01  -  17 Jun 2023 07:00  --------------------------------------------------------  IN: 0 mL / OUT: 250 mL / NET: -250 mL      CAPILLARY BLOOD GLUCOSE          RADIOLOGY/EKG:
Date of service: 23 @ 08:13    Lying in bed in NAD  Febrile to 101.8F last PM  Noted with fine macular rash on b/l arms, mild itchy  No vesicles    ROS: no fever or chills; denies dizziness, no HA, no SOB or cough, no abdominal pain, no diarrhea or constipation; no dysuria, no legs pain    MEDICATIONS  (STANDING):  allopurinol 300 milliGRAM(s) Oral daily  aspirin enteric coated 81 milliGRAM(s) Oral daily  atorvastatin 10 milliGRAM(s) Oral at bedtime  cholecalciferol 1000 Unit(s) Oral daily  digoxin     Tablet 125 MICROGram(s) Oral daily  metoprolol succinate ER 50 milliGRAM(s) Oral daily  pantoprazole    Tablet 40 milliGRAM(s) Oral before breakfast  rivaroxaban 15 milliGRAM(s) Oral daily    Vital Signs Last 24 Hrs  T(C): 37.6 (2023 07:43), Max: 38.8 (2023 17:42)  T(F): 99.7 (2023 07:43), Max: 101.8 (2023 17:42)  HR: 100 (2023 07:43) (78 - 100)  BP: 106/61 (2023 07:43) (92/44 - 106/61)  BP(mean): --  RR: 17 (2023 07:43) (16 - 20)  SpO2: 99% (2023 07:43) (90% - 99%)    Parameters below as of 2023 07:43  Patient On (Oxygen Delivery Method): room air     Physical exam:    Constitutional:  No acute distress  HEENT: NC/AT, EOMI, PERRLA, conjunctivae clear; ears and nose atraumatic;  Neck: supple; thyroid not palpable  Back: no tenderness  Respiratory: respiratory effort normal; clear to auscultation  Cardiovascular: S1S2 regular, no murmurs  Abdomen: soft, not tender, not distended, positive BS  Genitourinary: no suprapubic tenderness  Lymphatic: no LN palpable  Musculoskeletal: no muscle tenderness, no joint swelling or tenderness  Extremities: no pedal edema  Neurological/ Psychiatric: AxOx3, judgement and insight normal; moving all extremities  Skin: macular rash on arms; no palpable lesions    Labs: all available labs reviewed                        5.1    3.21  )-----------( 343      ( 2023 07:32 )             15.7     17    132<L>  |  99  |  47<H>  ----------------------------<  96  3.7   |  25  |  0.95    Ca    6.9<L>      2023 07:32    TPro  5.5<L>  /  Alb  2.7<L>  /  TBili  1.3<H>  /  DBili  x   /  AST  27  /  ALT  34  /  AlkPhos  111       LIVER FUNCTIONS - ( 2023 07:32 )  Alb: 2.7 g/dL / Pro: 5.5 gm/dL / ALK PHOS: 111 U/L / ALT: 34 U/L / AST: 27 U/L / GGT: x           Urinalysis Basic - ( 2023 22:00 )    Color: Yellow / Appearance: Clear / S.010 / pH: x  Gluc: x / Ketone: Negative  / Bili: Negative / Urobili: Negative   Blood: x / Protein: 30 mg/dL / Nitrite: Negative   Leuk Esterase: Small / RBC: 6-10 /HPF / WBC >50 /HPF   Sq Epi: x / Non Sq Epi: x / Bacteria: Occasional    ( @ 20:30)  NotDetec    Babesia microti PCR, Bld (collected 2023 11:16)    Culture - Blood (collected 2023 20:30)  Source: .Blood Blood-Peripheral  Preliminary Report (2023 02:02):    No growth to date.    Culture - Blood (collected 2023 20:30)  Source: .Blood Blood-Peripheral  Preliminary Report (2023 02:02):    No growth to date.    Radiology: all available radiological tests reviewed    < from: Xray Chest 1 View- PORTABLE-Urgent (23 @ 17:07) >  Cardiomegaly. Pulmonary vascular congestion. Increased right base effusion.  < end of copied text >    Advanced directives addressed: full resuscitation
Date of service: 23 @ 11:18    Sitting in bed in NAD  Skin rash on arms and chest  No fever    ROS: no fever or chills; denies dizziness, no HA, no SOB or cough, no abdominal pain, no diarrhea or constipation; no dysuria, no legs pain, no rashes    MEDICATIONS  (STANDING):  allopurinol 300 milliGRAM(s) Oral daily  aspirin enteric coated 81 milliGRAM(s) Oral daily  atorvastatin 10 milliGRAM(s) Oral at bedtime  cholecalciferol 1000 Unit(s) Oral daily  digoxin     Tablet 125 MICROGram(s) Oral daily  hydrocortisone 1% Cream 1 Application(s) Topical daily  loratadine 10 milliGRAM(s) Oral two times a day  metoprolol succinate ER 50 milliGRAM(s) Oral daily  pantoprazole    Tablet 40 milliGRAM(s) Oral before breakfast  rivaroxaban 15 milliGRAM(s) Oral daily    Vital Signs Last 24 Hrs  T(C): 36.5 (2023 08:28), Max: 36.9 (2023 00:18)  T(F): 97.7 (2023 08:28), Max: 98.4 (2023 00:18)  HR: 86 (2023 08:28) (85 - 88)  BP: 100/55 (2023 08:28) (93/43 - 100/55)  BP(mean): --  RR: 18 (2023 08:28) (17 - 18)  SpO2: 95% (2023 08:28) (94% - 97%)    Parameters below as of 2023 08:28  Patient On (Oxygen Delivery Method): room air     Physical exam:    Constitutional:  No acute distress  HEENT: NC/AT, EOMI, PERRLA, conjunctivae clear; ears and nose atraumatic;  Neck: supple; thyroid not palpable  Back: no tenderness  Respiratory: respiratory effort normal; clear to auscultation  Cardiovascular: S1S2 regular, no murmurs  Abdomen: soft, not tender, not distended, positive BS  Genitourinary: no suprapubic tenderness  Lymphatic: no LN palpable  Musculoskeletal: no muscle tenderness, no joint swelling or tenderness  Extremities: no pedal edema  Neurological/ Psychiatric: AxOx3, judgement and insight normal; moving all extremities  Skin: macular rash on arms; no palpable lesions    Labs: reviewed                        7.7    5.39  )-----------( 397      ( 2023 07:13 )             24.3     06-19    135  |  105  |  34<H>  ----------------------------<  101<H>  4.1   |  26  |  0.79    Ca    7.4<L>      2023 07:13  Phos  2.7     06-18  Mg     1.9     18    C-Reactive Protein, Serum: 37 mg/L (23 @ 11:16)                        5.1    3.21  )-----------( 343      ( 2023 07:32 )             15.7     06-17    132<L>  |  99  |  47<H>  ----------------------------<  96  3.7   |  25  |  0.95    Ca    6.9<L>      2023 07:32    TPro  5.5<L>  /  Alb  2.7<L>  /  TBili  1.3<H>  /  DBili  x   /  AST  27  /  ALT  34  /  AlkPhos  111  17     LIVER FUNCTIONS - ( 2023 07:32 )  Alb: 2.7 g/dL / Pro: 5.5 gm/dL / ALK PHOS: 111 U/L / ALT: 34 U/L / AST: 27 U/L / GGT: x           Urinalysis Basic - ( 2023 22:00 )    Color: Yellow / Appearance: Clear / S.010 / pH: x  Gluc: x / Ketone: Negative  / Bili: Negative / Urobili: Negative   Blood: x / Protein: 30 mg/dL / Nitrite: Negative   Leuk Esterase: Small / RBC: 6-10 /HPF / WBC >50 /HPF   Sq Epi: x / Non Sq Epi: x / Bacteria: Occasional    ( @ 20:30)  NotDetec    Babesia microti PCR, Bld (collected 2023 11:16)    Culture - Blood (collected 2023 20:30)  Source: .Blood Blood-Peripheral  Preliminary Report (2023 02:02):    No growth to date.    Culture - Blood (collected 2023 20:30)  Source: .Blood Blood-Peripheral  Preliminary Report (2023 02:02):    No growth to date.    Radiology: all available radiological tests reviewed    < from: Xray Chest 1 View- PORTABLE-Urgent (23 @ 17:07) >  Cardiomegaly. Pulmonary vascular congestion. Increased right base effusion.  < end of copied text >    Advanced directives addressed: full resuscitation
INTERVAL HPI/OVERNIGHT EVENTS:  Patient S&E at bedside. Patient noted to have been febrile overnight     ICU Vital Signs Last 24 Hrs  T(C): 37.6 (2023 07:43), Max: 38.8 (2023 17:42)  T(F): 99.7 (2023 07:43), Max: 101.8 (2023 17:42)  HR: 98 (2023 10:13) (78 - 100)  BP: 110/54 (2023 10:13) (92/44 - 110/54)  BP(mean): --  ABP: --  ABP(mean): --  RR: 17 (2023 07:43) (16 - 17)  SpO2: 99% (2023 07:43) (90% - 99%)    O2 Parameters below as of 2023 07:43  Patient On (Oxygen Delivery Method): room air            PHYSICAL EXAM:    Constitutional: NAD  Eyes: EOMI, sclera non-icteric  Neck: supple, no masses, no JVD  Respiratory: CTA b/l, good air entry b/l  Cardiovascular: RRR, no M/R/G  Gastrointestinal: soft, NTND, no masses palpable, + BS, no hepatosplenomegaly  Extremities: no c/c/e  Neurological: AAOx3      MEDICATIONS  (STANDING):  allopurinol 300 milliGRAM(s) Oral daily  aspirin enteric coated 81 milliGRAM(s) Oral daily  atorvastatin 10 milliGRAM(s) Oral at bedtime  cholecalciferol 1000 Unit(s) Oral daily  digoxin     Tablet 125 MICROGram(s) Oral daily  metoprolol succinate ER 50 milliGRAM(s) Oral daily  pantoprazole    Tablet 40 milliGRAM(s) Oral before breakfast  potassium chloride    Tablet ER 20 milliEquivalent(s) Oral once  rivaroxaban 15 milliGRAM(s) Oral daily    MEDICATIONS  (PRN):  acetaminophen     Tablet .. 650 milliGRAM(s) Oral every 6 hours PRN Temp greater or equal to 38C (100.4F), Mild Pain (1 - 3)  albuterol    90 MICROgram(s) HFA Inhaler 2 Puff(s) Inhalation every 6 hours PRN for shortness of breath and/or wheezing  aluminum hydroxide/magnesium hydroxide/simethicone Suspension 30 milliLiter(s) Oral every 4 hours PRN Dyspepsia  diphenhydrAMINE 25 milliGRAM(s) Oral every 6 hours PRN Rash and/or Itching  melatonin 3 milliGRAM(s) Oral at bedtime PRN Insomnia  ondansetron Injectable 4 milliGRAM(s) IV Push every 8 hours PRN Nausea and/or Vomiting      Allergies    No Known Allergies    Intolerances        LABS:                        7.8    4.10  )-----------( 364      ( 2023 07:58 )             23.6     06-18    134<L>  |  101  |  39<H>  ----------------------------<  119<H>  3.4<L>   |  26  |  0.86    Ca    7.2<L>      2023 11:24    TPro  5.5<L>  /  Alb  2.7<L>  /  TBili  1.3<H>  /  DBili  x   /  AST  27  /  ALT  34  /  AlkPhos  111  06-17    PT/INR - ( 2023 20:30 )   PT: 33.5 sec;   INR: 2.86 ratio         PTT - ( 2023 20:30 )  PTT:42.5 sec  Urinalysis Basic - ( 2023 22:00 )    Color: Yellow / Appearance: Clear / S.010 / pH: x  Gluc: x / Ketone: Negative  / Bili: Negative / Urobili: Negative   Blood: x / Protein: 30 mg/dL / Nitrite: Negative   Leuk Esterase: Small / RBC: 6-10 /HPF / WBC >50 /HPF   Sq Epi: x / Non Sq Epi: x / Bacteria: Occasional        RADIOLOGY & ADDITIONAL TESTS:  Studies reviewed.    ASSESSMENT & PLAN:

## 2023-06-19 NOTE — DIETITIAN INITIAL EVALUATION ADULT - ORAL INTAKE PTA/DIET HISTORY
Lives at home w/ her , able to cook and grocery shop w/o difficulties. Endorses "very good" appetite PTA, typically consumes 3 meals per day w/ snacks in between meals (likes to snack on fruit bars). Denies suppl use at home. NKFA noted in chart.

## 2023-06-19 NOTE — DISCHARGE NOTE PROVIDER - CARE PROVIDER_API CALL
Preston Varam  Internal Medicine  [address] [place] [address]  Phone: [phone]  Fax: [phone]  Established Patient  Follow Up Time: 1-3 days    Ronen Rivera)  Internal Medicine  [address] [place] [address]  Phone: [phone]  Fax: [phone]  Established Patient  Follow Up Time: 1 week

## 2023-06-20 LAB
A PHAGOCYTOPH DNA BLD QL NAA+PROBE: NEGATIVE — SIGNIFICANT CHANGE UP
E CHAFFEENSIS DNA BLD QL NAA+PROBE: NEGATIVE — SIGNIFICANT CHANGE UP
E EWINGII DNA SPEC QL NAA+PROBE: NEGATIVE — SIGNIFICANT CHANGE UP
EHRLICHIA DNA SPEC QL NAA+PROBE: NEGATIVE — SIGNIFICANT CHANGE UP

## 2023-06-20 NOTE — CDI QUERY NOTE - NSCDIOTHERTXTBX_GEN_ALL_CORE_HH
Clinical documentation indicates that this patient has CHF.  Please include more specific documentation of the acuity and, type  of Heart Failure in your Progress Note and/or Discharge Summary.    -Acute ___ CHF   -Acute on chronic ___ CHF  -Chronic ___ CHF  -Right Heart Failure  -Biventricular heart Failure (include the type-systolic/diastolic also)  -Other (please specify)  -Not clinically significant      SUPPORTING DOCUMENTATION AND/OR CLINICAL EVIDENCE:    H&P documentation on 6/17/2023  H/O CHF       ECHO RESULTS:  < from: TTE Echo Complete w/o Contrast w/ Doppler (03.15.23 @ 11:22) >   Estimated left ventricular ejection fraction is 60 %.      CHEST XRAY:  < from: Xray Chest 1 View- PORTABLE-Urgent (06.16.23 @ 21:31) >    Elevation right hemidiaphragm. No gross consolidation or pleural effusion    Heart size cannot be accurately assessed in this projection, but appear   enlarged.      MEDICATIONS:  furosemide   Injectable   20 milliGRAM(s) IV Push (06-17-23)    furosemide   Injectable   20 milliGRAM(s) IV Push (06-18-23)    furosemide   Injectable   20 milliGRAM(s) IV Push (06-17-23)

## 2023-06-20 NOTE — CDI QUERY NOTE - NSCDI_DOCCLARIFY2_GEN_ALL_CORE_FT
no.      can do 8/26 7:50    or 8/23 8:10      let me know.   Documentation clarification is required for accuracy in coding and billing, claim validation and reporting severity of illness, quality data and risk of mortality.

## 2023-06-20 NOTE — CDI QUERY NOTE - NSCDINOTECODERNAME_GEN_A_CORE_FT
Body Location Override (Optional - Billing Will Still Be Based On Selected Body Map Location If Applicable): left superior scalp
Detail Level: Detailed
Add 81177 Cpt? (Important Note: In 2017 The Use Of 92882 Is Being Tracked By Cms To Determine Future Global Period Reimbursement For Global Periods): no
Paulina Sarkar RN, MS, CCDS

## 2023-06-22 DIAGNOSIS — D46.9 MYELODYSPLASTIC SYNDROME, UNSPECIFIED: ICD-10-CM

## 2023-06-22 DIAGNOSIS — E43 UNSPECIFIED SEVERE PROTEIN-CALORIE MALNUTRITION: ICD-10-CM

## 2023-06-22 DIAGNOSIS — D84.9 IMMUNODEFICIENCY, UNSPECIFIED: ICD-10-CM

## 2023-06-22 DIAGNOSIS — Z99.81 DEPENDENCE ON SUPPLEMENTAL OXYGEN: ICD-10-CM

## 2023-06-22 DIAGNOSIS — D63.8 ANEMIA IN OTHER CHRONIC DISEASES CLASSIFIED ELSEWHERE: ICD-10-CM

## 2023-06-22 DIAGNOSIS — R50.2 DRUG INDUCED FEVER: ICD-10-CM
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
## 2023-06-22 DIAGNOSIS — I50.32 CHRONIC DIASTOLIC (CONGESTIVE) HEART FAILURE: ICD-10-CM

## 2023-06-22 DIAGNOSIS — I48.20 CHRONIC ATRIAL FIBRILLATION, UNSPECIFIED: ICD-10-CM

## 2023-06-22 DIAGNOSIS — E87.1 HYPO-OSMOLALITY AND HYPONATREMIA: ICD-10-CM

## 2023-06-22 DIAGNOSIS — Z87.442 PERSONAL HISTORY OF URINARY CALCULI: ICD-10-CM

## 2023-06-22 DIAGNOSIS — J44.9 CHRONIC OBSTRUCTIVE PULMONARY DISEASE, UNSPECIFIED: ICD-10-CM
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
## 2023-06-22 DIAGNOSIS — Z79.82 LONG TERM (CURRENT) USE OF ASPIRIN: ICD-10-CM

## 2023-06-22 DIAGNOSIS — T45.1X5A ADVERSE EFFECT OF ANTINEOPLASTIC AND IMMUNOSUPPRESSIVE DRUGS, INITIAL ENCOUNTER: ICD-10-CM
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
## 2023-06-22 DIAGNOSIS — D75.81 MYELOFIBROSIS: ICD-10-CM

## 2023-06-22 DIAGNOSIS — Z87.01 PERSONAL HISTORY OF PNEUMONIA (RECURRENT): ICD-10-CM

## 2023-06-22 LAB
CULTURE RESULTS: SIGNIFICANT CHANGE UP
CULTURE RESULTS: SIGNIFICANT CHANGE UP
SPECIMEN SOURCE: SIGNIFICANT CHANGE UP
SPECIMEN SOURCE: SIGNIFICANT CHANGE UP

## 2023-06-26 ENCOUNTER — INPATIENT (INPATIENT)
Facility: HOSPITAL | Age: 73
LOS: 1 days | Discharge: HOME | DRG: 607 | End: 2023-06-28
Attending: HOSPITALIST | Admitting: FAMILY MEDICINE
Payer: MEDICARE

## 2023-06-26 VITALS — WEIGHT: 139.99 LBS | HEIGHT: 61 IN

## 2023-06-26 DIAGNOSIS — R21 RASH AND OTHER NONSPECIFIC SKIN ERUPTION: ICD-10-CM

## 2023-06-26 LAB
ALBUMIN SERPL ELPH-MCNC: 3.2 G/DL — LOW (ref 3.3–5)
ALP SERPL-CCNC: 106 U/L — SIGNIFICANT CHANGE UP (ref 40–120)
ALT FLD-CCNC: 21 U/L — SIGNIFICANT CHANGE UP (ref 12–78)
ANION GAP SERPL CALC-SCNC: 5 MMOL/L — SIGNIFICANT CHANGE UP (ref 5–17)
APPEARANCE UR: CLEAR — SIGNIFICANT CHANGE UP
AST SERPL-CCNC: 14 U/L — LOW (ref 15–37)
BACTERIA # UR AUTO: ABNORMAL
BASOPHILS # BLD AUTO: 0.2 K/UL — SIGNIFICANT CHANGE UP (ref 0–0.2)
BASOPHILS NFR BLD AUTO: 1 % — SIGNIFICANT CHANGE UP (ref 0–2)
BILIRUB SERPL-MCNC: 0.5 MG/DL — SIGNIFICANT CHANGE UP (ref 0.2–1.2)
BILIRUB UR-MCNC: NEGATIVE — SIGNIFICANT CHANGE UP
BUN SERPL-MCNC: 40 MG/DL — HIGH (ref 7–23)
CALCIUM SERPL-MCNC: 8.1 MG/DL — LOW (ref 8.5–10.1)
CHLORIDE SERPL-SCNC: 100 MMOL/L — SIGNIFICANT CHANGE UP (ref 96–108)
CO2 SERPL-SCNC: 27 MMOL/L — SIGNIFICANT CHANGE UP (ref 22–31)
COLOR SPEC: YELLOW — SIGNIFICANT CHANGE UP
CREAT SERPL-MCNC: 0.97 MG/DL — SIGNIFICANT CHANGE UP (ref 0.5–1.3)
DIFF PNL FLD: ABNORMAL
EGFR: 62 ML/MIN/1.73M2 — SIGNIFICANT CHANGE UP
EOSINOPHIL # BLD AUTO: 1.23 K/UL — HIGH (ref 0–0.5)
EOSINOPHIL NFR BLD AUTO: 6.1 % — HIGH (ref 0–6)
EPI CELLS # UR: SIGNIFICANT CHANGE UP
GLUCOSE SERPL-MCNC: 113 MG/DL — HIGH (ref 70–99)
GLUCOSE UR QL: NEGATIVE — SIGNIFICANT CHANGE UP
HCT VFR BLD CALC: 27.7 % — LOW (ref 34.5–45)
HGB BLD-MCNC: 8.9 G/DL — LOW (ref 11.5–15.5)
IMM GRANULOCYTES NFR BLD AUTO: 8.2 % — HIGH (ref 0–0.9)
KETONES UR-MCNC: NEGATIVE — SIGNIFICANT CHANGE UP
LACTATE SERPL-SCNC: 1.8 MMOL/L — SIGNIFICANT CHANGE UP (ref 0.7–2)
LACTATE SERPL-SCNC: 2.9 MMOL/L — HIGH (ref 0.7–2)
LEUKOCYTE ESTERASE UR-ACNC: ABNORMAL
LYMPHOCYTES # BLD AUTO: 10.4 % — LOW (ref 13–44)
LYMPHOCYTES # BLD AUTO: 2.1 K/UL — SIGNIFICANT CHANGE UP (ref 1–3.3)
MANUAL SMEAR VERIFICATION: SIGNIFICANT CHANGE UP
MCHC RBC-ENTMCNC: 30.6 PG — SIGNIFICANT CHANGE UP (ref 27–34)
MCHC RBC-ENTMCNC: 32.1 GM/DL — SIGNIFICANT CHANGE UP (ref 32–36)
MCV RBC AUTO: 95.2 FL — SIGNIFICANT CHANGE UP (ref 80–100)
MONOCYTES # BLD AUTO: 1 K/UL — HIGH (ref 0–0.9)
MONOCYTES NFR BLD AUTO: 5 % — SIGNIFICANT CHANGE UP (ref 2–14)
NEUTROPHILS # BLD AUTO: 13.96 K/UL — HIGH (ref 1.8–7.4)
NEUTROPHILS NFR BLD AUTO: 69.3 % — SIGNIFICANT CHANGE UP (ref 43–77)
NITRITE UR-MCNC: NEGATIVE — SIGNIFICANT CHANGE UP
NRBC # BLD: 2 /100 WBCS — HIGH (ref 0–0)
PH UR: 5 — SIGNIFICANT CHANGE UP (ref 5–8)
PLAT MORPH BLD: SIGNIFICANT CHANGE UP
PLATELET # BLD AUTO: 1371 K/UL — CRITICAL HIGH (ref 150–400)
POTASSIUM SERPL-MCNC: 4.9 MMOL/L — SIGNIFICANT CHANGE UP (ref 3.5–5.3)
POTASSIUM SERPL-SCNC: 4.9 MMOL/L — SIGNIFICANT CHANGE UP (ref 3.5–5.3)
PROT SERPL-MCNC: 6.2 GM/DL — SIGNIFICANT CHANGE UP (ref 6–8.3)
PROT UR-MCNC: NEGATIVE — SIGNIFICANT CHANGE UP
RBC # BLD: 2.91 M/UL — LOW (ref 3.8–5.2)
RBC # FLD: 21.5 % — HIGH (ref 10.3–14.5)
RBC BLD AUTO: SIGNIFICANT CHANGE UP
RBC CASTS # UR COMP ASSIST: ABNORMAL /HPF (ref 0–4)
SODIUM SERPL-SCNC: 132 MMOL/L — LOW (ref 135–145)
SP GR SPEC: 1.01 — SIGNIFICANT CHANGE UP (ref 1.01–1.02)
TROPONIN I, HIGH SENSITIVITY RESULT: 8.16 NG/L — SIGNIFICANT CHANGE UP
UROBILINOGEN FLD QL: NEGATIVE — SIGNIFICANT CHANGE UP
WBC # BLD: 20.15 K/UL — HIGH (ref 3.8–10.5)
WBC # FLD AUTO: 20.15 K/UL — HIGH (ref 3.8–10.5)
WBC UR QL: SIGNIFICANT CHANGE UP /HPF (ref 0–5)

## 2023-06-26 PROCEDURE — 81001 URINALYSIS AUTO W/SCOPE: CPT

## 2023-06-26 PROCEDURE — 93971 EXTREMITY STUDY: CPT | Mod: 26,RT

## 2023-06-26 PROCEDURE — 86900 BLOOD TYPING SEROLOGIC ABO: CPT

## 2023-06-26 PROCEDURE — 99285 EMERGENCY DEPT VISIT HI MDM: CPT

## 2023-06-26 PROCEDURE — 99497 ADVNCD CARE PLAN 30 MIN: CPT | Mod: 25

## 2023-06-26 PROCEDURE — 83605 ASSAY OF LACTIC ACID: CPT

## 2023-06-26 PROCEDURE — 36430 TRANSFUSION BLD/BLD COMPNT: CPT

## 2023-06-26 PROCEDURE — 85027 COMPLETE CBC AUTOMATED: CPT

## 2023-06-26 PROCEDURE — P9016: CPT

## 2023-06-26 PROCEDURE — 80048 BASIC METABOLIC PNL TOTAL CA: CPT

## 2023-06-26 PROCEDURE — 86901 BLOOD TYPING SEROLOGIC RH(D): CPT

## 2023-06-26 PROCEDURE — 99223 1ST HOSP IP/OBS HIGH 75: CPT

## 2023-06-26 PROCEDURE — 86880 COOMBS TEST DIRECT: CPT

## 2023-06-26 PROCEDURE — 86850 RBC ANTIBODY SCREEN: CPT

## 2023-06-26 PROCEDURE — 82272 OCCULT BLD FECES 1-3 TESTS: CPT

## 2023-06-26 PROCEDURE — 86920 COMPATIBILITY TEST SPIN: CPT

## 2023-06-26 PROCEDURE — 93010 ELECTROCARDIOGRAM REPORT: CPT

## 2023-06-26 PROCEDURE — 93005 ELECTROCARDIOGRAM TRACING: CPT

## 2023-06-26 PROCEDURE — 86921 COMPATIBILITY TEST INCUBATE: CPT

## 2023-06-26 PROCEDURE — 86922 COMPATIBILITY TEST ANTIGLOB: CPT

## 2023-06-26 PROCEDURE — 86870 RBC ANTIBODY IDENTIFICATION: CPT

## 2023-06-26 PROCEDURE — 36415 COLL VENOUS BLD VENIPUNCTURE: CPT

## 2023-06-26 PROCEDURE — 80162 ASSAY OF DIGOXIN TOTAL: CPT

## 2023-06-26 PROCEDURE — 85025 COMPLETE CBC W/AUTO DIFF WBC: CPT

## 2023-06-26 RX ORDER — RIVAROXABAN 15 MG-20MG
20 KIT ORAL
Refills: 0 | Status: DISCONTINUED | OUTPATIENT
Start: 2023-06-26 | End: 2023-06-28

## 2023-06-26 RX ORDER — VANCOMYCIN HCL 1 G
1000 VIAL (EA) INTRAVENOUS ONCE
Refills: 0 | Status: COMPLETED | OUTPATIENT
Start: 2023-06-26 | End: 2023-06-26

## 2023-06-26 RX ORDER — BUMETANIDE 0.25 MG/ML
1 INJECTION INTRAMUSCULAR; INTRAVENOUS DAILY
Refills: 0 | Status: DISCONTINUED | OUTPATIENT
Start: 2023-06-26 | End: 2023-06-28

## 2023-06-26 RX ORDER — ALBUTEROL 90 UG/1
2 AEROSOL, METERED ORAL EVERY 6 HOURS
Refills: 0 | Status: DISCONTINUED | OUTPATIENT
Start: 2023-06-26 | End: 2023-06-28

## 2023-06-26 RX ORDER — PANTOPRAZOLE SODIUM 20 MG/1
40 TABLET, DELAYED RELEASE ORAL
Refills: 0 | Status: DISCONTINUED | OUTPATIENT
Start: 2023-06-26 | End: 2023-06-28

## 2023-06-26 RX ORDER — ATORVASTATIN CALCIUM 80 MG/1
1 TABLET, FILM COATED ORAL
Refills: 0 | DISCHARGE

## 2023-06-26 RX ORDER — ASPIRIN/CALCIUM CARB/MAGNESIUM 324 MG
81 TABLET ORAL DAILY
Refills: 0 | Status: DISCONTINUED | OUTPATIENT
Start: 2023-06-26 | End: 2023-06-28

## 2023-06-26 RX ORDER — UMECLIDINIUM BROMIDE AND VILANTEROL TRIFENATATE 62.5; 25 UG/1; UG/1
1 POWDER RESPIRATORY (INHALATION)
Refills: 0 | DISCHARGE

## 2023-06-26 RX ORDER — SODIUM CHLORIDE 9 MG/ML
1000 INJECTION INTRAMUSCULAR; INTRAVENOUS; SUBCUTANEOUS ONCE
Refills: 0 | Status: COMPLETED | OUTPATIENT
Start: 2023-06-26 | End: 2023-06-26

## 2023-06-26 RX ORDER — METOPROLOL TARTRATE 50 MG
1 TABLET ORAL
Qty: 0 | Refills: 0 | DISCHARGE

## 2023-06-26 RX ORDER — CHOLECALCIFEROL (VITAMIN D3) 125 MCG
1 CAPSULE ORAL
Refills: 0 | DISCHARGE

## 2023-06-26 RX ORDER — DIPHENHYDRAMINE HCL 50 MG
25 CAPSULE ORAL ONCE
Refills: 0 | Status: COMPLETED | OUTPATIENT
Start: 2023-06-26 | End: 2023-06-26

## 2023-06-26 RX ORDER — PIPERACILLIN AND TAZOBACTAM 4; .5 G/20ML; G/20ML
3.38 INJECTION, POWDER, LYOPHILIZED, FOR SOLUTION INTRAVENOUS ONCE
Refills: 0 | Status: COMPLETED | OUTPATIENT
Start: 2023-06-26 | End: 2023-06-26

## 2023-06-26 RX ORDER — CHOLECALCIFEROL (VITAMIN D3) 125 MCG
5000 CAPSULE ORAL DAILY
Refills: 0 | Status: DISCONTINUED | OUTPATIENT
Start: 2023-06-26 | End: 2023-06-28

## 2023-06-26 RX ORDER — METOPROLOL TARTRATE 50 MG
50 TABLET ORAL DAILY
Refills: 0 | Status: DISCONTINUED | OUTPATIENT
Start: 2023-06-26 | End: 2023-06-27

## 2023-06-26 RX ORDER — OMEPRAZOLE 10 MG/1
1 CAPSULE, DELAYED RELEASE ORAL
Refills: 0 | DISCHARGE

## 2023-06-26 RX ORDER — CYST/ALA/Q10/PHOS.SER/DHA/BROC 100-20-50
1 POWDER (GRAM) ORAL
Refills: 0 | DISCHARGE

## 2023-06-26 RX ORDER — HYDROXYUREA 500 MG/1
1000 CAPSULE ORAL DAILY
Refills: 0 | Status: DISCONTINUED | OUTPATIENT
Start: 2023-06-26 | End: 2023-06-27

## 2023-06-26 RX ORDER — BUMETANIDE 0.25 MG/ML
1 INJECTION INTRAMUSCULAR; INTRAVENOUS
Refills: 0 | DISCHARGE

## 2023-06-26 RX ORDER — ATORVASTATIN CALCIUM 80 MG/1
10 TABLET, FILM COATED ORAL AT BEDTIME
Refills: 0 | Status: DISCONTINUED | OUTPATIENT
Start: 2023-06-26 | End: 2023-06-28

## 2023-06-26 RX ORDER — DIPHENHYDRAMINE HCL 50 MG
50 CAPSULE ORAL EVERY 6 HOURS
Refills: 0 | Status: DISCONTINUED | OUTPATIENT
Start: 2023-06-26 | End: 2023-06-27

## 2023-06-26 RX ORDER — DIGOXIN 250 MCG
125 TABLET ORAL DAILY
Refills: 0 | Status: DISCONTINUED | OUTPATIENT
Start: 2023-06-26 | End: 2023-06-28

## 2023-06-26 RX ORDER — ASPIRIN/CALCIUM CARB/MAGNESIUM 324 MG
1 TABLET ORAL
Qty: 0 | Refills: 0 | DISCHARGE

## 2023-06-26 RX ORDER — DIGOXIN 250 MCG
1 TABLET ORAL
Refills: 0 | DISCHARGE

## 2023-06-26 RX ORDER — ALBUTEROL 90 UG/1
2 AEROSOL, METERED ORAL
Refills: 0 | DISCHARGE

## 2023-06-26 RX ORDER — FAMOTIDINE 10 MG/ML
20 INJECTION INTRAVENOUS ONCE
Refills: 0 | Status: COMPLETED | OUTPATIENT
Start: 2023-06-26 | End: 2023-06-26

## 2023-06-26 RX ADMIN — SODIUM CHLORIDE 1000 MILLILITER(S): 9 INJECTION INTRAMUSCULAR; INTRAVENOUS; SUBCUTANEOUS at 15:57

## 2023-06-26 RX ADMIN — Medication 81 MILLIGRAM(S): at 23:36

## 2023-06-26 RX ADMIN — PIPERACILLIN AND TAZOBACTAM 200 GRAM(S): 4; .5 INJECTION, POWDER, LYOPHILIZED, FOR SOLUTION INTRAVENOUS at 16:49

## 2023-06-26 RX ADMIN — RIVAROXABAN 20 MILLIGRAM(S): KIT at 23:36

## 2023-06-26 RX ADMIN — HYDROXYUREA 1000 MILLIGRAM(S): 500 CAPSULE ORAL at 22:17

## 2023-06-26 RX ADMIN — Medication 25 MILLIGRAM(S): at 15:54

## 2023-06-26 RX ADMIN — FAMOTIDINE 20 MILLIGRAM(S): 10 INJECTION INTRAVENOUS at 15:54

## 2023-06-26 RX ADMIN — Medication 250 MILLIGRAM(S): at 18:15

## 2023-06-26 RX ADMIN — Medication 50 MILLIGRAM(S): at 23:37

## 2023-06-26 RX ADMIN — Medication 40 MILLIGRAM(S): at 15:54

## 2023-06-26 NOTE — ED PROVIDER NOTE - NSFOLLOWUPINSTRUCTIONS_ED_ALL_ED_FT
Follow-up with your regular physician  Return with any persistent/worsening symptoms.   Continue benadryl as needed for rash/itching.  Add Prednisone (20mg) 2 pill daily for next four days (starting tomorrow)  Consider Pepcid (20mg) 1 pill twice daily (may also help rash, itching)    Discuss need for Allopurinol with your primary care physician.

## 2023-06-26 NOTE — ED ADULT NURSE NOTE - CHIEF COMPLAINT QUOTE
Pt presents to er with complaints of Myelofibrosis and myelodysplastic syndrome, sent in from [facility] for possible allergic reaction to allopurinol or Revlimid, states she has been taking meds for the past few weeks, denies chest pain, sob, pt speaking in clear/full sentences.

## 2023-06-26 NOTE — ED PROVIDER NOTE - OBJECTIVE STATEMENT
73 y/o female w/ PMHx of CHF, myelofibrosis, Afib, anemia, COPD, kidney stones, PNA, pericardial effusion presents to the ED c/o allergic reaction w/ hives and swelling on her extremities. Pt recently started Revlimid and Allopurinol and thinks it could be a reaction to the medication. Pt endorses swelling to her b/l legs, worse on right.

## 2023-06-26 NOTE — ED PROVIDER NOTE - PROGRESS NOTE DETAILS
Old chart reviewed - developed rash last admission. Unclear precipitant - ?revlamid ?allopurinol.  Rash persists, although transient improvement with benadryl. Today with hand swelling - to ED.  No shortness of breath, no oropharyngeal c/o.  Will tx with steroids, benadryl, pepcid and reassess.

## 2023-06-26 NOTE — ED ADULT NURSE NOTE - NSFALLUNIVINTERV_ED_ALL_ED
Bed/Stretcher in lowest position, wheels locked, appropriate side rails in place/Call bell, personal items and telephone in reach/Instruct patient to call for assistance before getting out of bed/chair/stretcher/Non-slip footwear applied when patient is off stretcher/[place] to call system/Physically safe environment - no spills, clutter or unnecessary equipment/Purposeful proactive rounding/Room/bathroom lighting operational, light cord in reach

## 2023-06-26 NOTE — H&P ADULT - NSHPPHYSICALEXAM_GEN_ALL_CORE
ICU Vital Signs Last 24 Hrs  T(C): 36.8 (26 Jun 2023 20:14), Max: 36.8 (26 Jun 2023 13:20)  T(F): 98.3 (26 Jun 2023 20:14), Max: 98.3 (26 Jun 2023 13:20)  HR: 79 (26 Jun 2023 20:14) (78 - 81)  BP: 98/60 (26 Jun 2023 20:14) (98/60 - 117/82)  BP(mean): 73 (26 Jun 2023 16:08) (73 - 92)    RR: 20 (26 Jun 2023 20:14) (20 - 20)  SpO2: 94% (26 Jun 2023 20:14) (94% - 97%)    O2 Parameters below as of 26 Jun 2023 20:14  Patient On (Oxygen Delivery Method): room air

## 2023-06-26 NOTE — H&P ADULT - ASSESSMENT
Pt is admitted w/    Allergic reaction to Revlimid vs allopurinol  Thrombocytosis , WBC 20, Hb 8.9, plt 1.371 million  Immunocompromised state  Hypotension to 85 and 90s systolic  Anemia, hx of monthly transfusions    - hold REVLIMID in the interim   - s/p 2 L IVF in the ED  - s/p prednisone 4omg in the ED, cont with Solumedrol 40 mg iv Qday  - s/p Benadryl in the ED, cont Q 6 hr, until improved symptoms  - s/p Vanco Zosyn in the ED, will cont with Cefepime and f/u cx,  no fever/chills, no source of infection isolated as yet  - s/p Hydroxyurea 1000mg in the ED, cont for 2 more days  to help bring the plt count down-  -  ASA 81 mg and xarelto due to high plt count- pt had outpatient VWF profile and was negative   - repeat is Hemoglobin 6.5 tonight  - will order 1 unit PRBCs  - follow AM labs and dig level  - apprec Oncology consult  - DVT proph on xaralto  - Full Code

## 2023-06-26 NOTE — H&P ADULT - NSHPLABSRESULTS_GEN_ALL_CORE
my interpretation    EKG:  NSR 72 bpm, LAD,  qs in III, avF,  qs and T wave inv in v2v3, low voltage

## 2023-06-26 NOTE — ED PROVIDER NOTE - CLINICAL SUMMARY MEDICAL DECISION MAKING FREE TEXT BOX
Plan: follow up labs, US review prior records. Plan: follow up labs, US review prior records.    Patient with elevated white count elevated lactate thrombocytosis.  Case discussed with oncology states patient should be admitted and oncology team will follow.  Case discussed with hospitalist accepts.

## 2023-06-26 NOTE — ED STATDOCS - PROGRESS NOTE DETAILS
Corine Abdalla for Dr. Chung :  73 y/o female w/ PMHx of CHF, myelofibrosis, Afib, anemia, COPD, kidney stones, PNA, pericardial effusion presents to the ED c/o allergic reaction w/ hives and swelling on her extremities. Pt recently started Revlimid and Allopurinol and thinks it could be a reaction to the medication. Pt endorses swelling to her b/l legs, worse on right. Will send pt to main ED for further evaluation.

## 2023-06-26 NOTE — ED ADULT NURSE NOTE - NS PRO PASSIVE SMOKE EXP
[facility] calls, wondering if our office received fax regarding report. They are notified that our office did receive this result and Dr Clark's office has been given this result.  
No

## 2023-06-26 NOTE — H&P ADULT - NSHPSOCIALHISTORY_GEN_ALL_CORE
Pt lives with her .  Pt is a hairdresser.  She quit smoking in 2013 after 20 pack years.  NO ETOH/drug abuse.

## 2023-06-26 NOTE — H&P ADULT - HISTORY OF PRESENT ILLNESS
Pt is a pleasant 71 y/o female w/ PMHx of CHF, myelofibrosis, Afib, anemia, COPD, kidney stones, PNA, pericardial effusion who was recently started on Revlimid and Allopurinol and  presented to [place] ED c/o allergic reaction w/ hives and swelling on her extremities. Pt  began having itching two - three weeks ago , followed by a rash.    She took her medications this morning at 7am and noticed the rash was getting worse.  She reported  swelling to her  legs, espec worse on the right.   No breathing difficulty or oral swelling.    She denies fever/chills,  no URI or UTI complaints,  no n/v/d , no abd pain, no chest  pain, no SOB.

## 2023-06-26 NOTE — PHARMACOTHERAPY INTERVENTION NOTE - COMMENTS
Medication reconciliation completed.  Reviewed Medication list and confirmed med allergies with patient; confirmed with Dr. First Medgarrett.

## 2023-06-26 NOTE — ED ADULT NURSE REASSESSMENT NOTE - NS ED NURSE REASSESS COMMENT FT1
Pt received from ONIEL Dupree. no complaints at this time, Pt reports no itching in her hands, swelling is still present. Reviewed plan of care, pt verbalized understanding. Admitted, awaiting to go upstairs. safety and comfort measures maintained.

## 2023-06-26 NOTE — ED ADULT TRIAGE NOTE - CHIEF COMPLAINT QUOTE
Pt presents to er with complaints of Myelofibrosis and myelodysplastic syndrome, sent in from [facility] for possible allergic reaction to allopurinol or Revlimid, states she has been taking meds for the past few weeks, denies chest pain, sob, pt speaking in clear/full sentences.

## 2023-06-26 NOTE — ED PROVIDER NOTE - CARE PLAN
1 Principal Discharge DX:	Rash   Principal Discharge DX:	Rash  Secondary Diagnosis:	Thrombocytosis

## 2023-06-26 NOTE — ED PROVIDER NOTE - SKIN, MLM
From: Anam Crain  To: Raj Rosas  Sent: 3/22/2022 12:40 PM CDT  Subject: Adderall    Smith Lorenzo,  We received the message that you are out of your Adderall. You are prescribed to take it twice a day. You are saying you are out already? That is 12 pills. Are you taking this correctly? Unfortunately, you will need to wait for your refill date, as this is a controlled substance. If you require more medication than you are being prescribed, please make an appointment with Dr Ana Rosa Jhaveri to discuss this.  Sincerely, HE WITT bilateral hands swelling without tenderness, rash to lower extremity no blistering

## 2023-06-27 LAB
ALLERGY+IMMUNOLOGY DIAG STUDY NOTE: SIGNIFICANT CHANGE UP
ANION GAP SERPL CALC-SCNC: 5 MMOL/L — SIGNIFICANT CHANGE UP (ref 5–17)
ANTIBODY INTERPRETATION 2: SIGNIFICANT CHANGE UP
BASOPHILS # BLD AUTO: 0.27 K/UL — HIGH (ref 0–0.2)
BASOPHILS NFR BLD AUTO: 2 % — SIGNIFICANT CHANGE UP (ref 0–2)
BLASTS # FLD: 1 % — HIGH (ref 0–0)
BLD GP AB SCN SERPL QL: SIGNIFICANT CHANGE UP
BUN SERPL-MCNC: 34 MG/DL — HIGH (ref 7–23)
CALCIUM SERPL-MCNC: 7.8 MG/DL — LOW (ref 8.5–10.1)
CHLORIDE SERPL-SCNC: 105 MMOL/L — SIGNIFICANT CHANGE UP (ref 96–108)
CO2 SERPL-SCNC: 24 MMOL/L — SIGNIFICANT CHANGE UP (ref 22–31)
CREAT SERPL-MCNC: 0.82 MG/DL — SIGNIFICANT CHANGE UP (ref 0.5–1.3)
DIGOXIN SERPL-MCNC: 0.6 NG/ML — LOW (ref 0.8–2)
DIR ANTIGLOB POLYSPECIFIC INTERPRETATION: SIGNIFICANT CHANGE UP
EGFR: 76 ML/MIN/1.73M2 — SIGNIFICANT CHANGE UP
EOSINOPHIL # BLD AUTO: 0.53 K/UL — HIGH (ref 0–0.5)
EOSINOPHIL NFR BLD AUTO: 4 % — SIGNIFICANT CHANGE UP (ref 0–6)
GLUCOSE SERPL-MCNC: 186 MG/DL — HIGH (ref 70–99)
HCT VFR BLD CALC: 19.8 % — CRITICAL LOW (ref 34.5–45)
HCT VFR BLD CALC: 22.7 % — LOW (ref 34.5–45)
HGB BLD-MCNC: 6.5 G/DL — CRITICAL LOW (ref 11.5–15.5)
HGB BLD-MCNC: 7.3 G/DL — LOW (ref 11.5–15.5)
LYMPHOCYTES # BLD AUTO: 1.2 K/UL — SIGNIFICANT CHANGE UP (ref 1–3.3)
LYMPHOCYTES # BLD AUTO: 9 % — LOW (ref 13–44)
MANUAL SMEAR VERIFICATION: SIGNIFICANT CHANGE UP
MCHC RBC-ENTMCNC: 30.9 PG — SIGNIFICANT CHANGE UP (ref 27–34)
MCHC RBC-ENTMCNC: 31.3 PG — SIGNIFICANT CHANGE UP (ref 27–34)
MCHC RBC-ENTMCNC: 32.2 GM/DL — SIGNIFICANT CHANGE UP (ref 32–36)
MCHC RBC-ENTMCNC: 32.8 GM/DL — SIGNIFICANT CHANGE UP (ref 32–36)
MCV RBC AUTO: 95.2 FL — SIGNIFICANT CHANGE UP (ref 80–100)
MCV RBC AUTO: 96.2 FL — SIGNIFICANT CHANGE UP (ref 80–100)
METAMYELOCYTES # FLD: 4 % — HIGH (ref 0–0)
MONOCYTES # BLD AUTO: 0.53 K/UL — SIGNIFICANT CHANGE UP (ref 0–0.9)
MONOCYTES NFR BLD AUTO: 4 % — SIGNIFICANT CHANGE UP (ref 2–14)
MYELOCYTES NFR BLD: 3 % — HIGH (ref 0–0)
NEUTROPHILS # BLD AUTO: 9.76 K/UL — HIGH (ref 1.8–7.4)
NEUTROPHILS NFR BLD AUTO: 70 % — SIGNIFICANT CHANGE UP (ref 43–77)
NEUTS BAND # BLD: 3 % — SIGNIFICANT CHANGE UP (ref 0–8)
NRBC # BLD: 0 /100 — SIGNIFICANT CHANGE UP (ref 0–0)
OB PNL STL: NEGATIVE — SIGNIFICANT CHANGE UP
PLAT MORPH BLD: NORMAL — SIGNIFICANT CHANGE UP
PLATELET # BLD AUTO: 806 K/UL — HIGH (ref 150–400)
PLATELET # BLD AUTO: 901 K/UL — HIGH (ref 150–400)
POTASSIUM SERPL-MCNC: 4.2 MMOL/L — SIGNIFICANT CHANGE UP (ref 3.5–5.3)
POTASSIUM SERPL-SCNC: 4.2 MMOL/L — SIGNIFICANT CHANGE UP (ref 3.5–5.3)
RBC # BLD: 2.08 M/UL — LOW (ref 3.8–5.2)
RBC # BLD: 2.36 M/UL — LOW (ref 3.8–5.2)
RBC # FLD: 20 % — HIGH (ref 10.3–14.5)
RBC # FLD: 20.9 % — HIGH (ref 10.3–14.5)
RBC BLD AUTO: SIGNIFICANT CHANGE UP
SODIUM SERPL-SCNC: 134 MMOL/L — LOW (ref 135–145)
WBC # BLD: 13.37 K/UL — HIGH (ref 3.8–10.5)
WBC # BLD: 15.77 K/UL — HIGH (ref 3.8–10.5)
WBC # FLD AUTO: 13.37 K/UL — HIGH (ref 3.8–10.5)
WBC # FLD AUTO: 15.77 K/UL — HIGH (ref 3.8–10.5)

## 2023-06-27 PROCEDURE — 86077 PHYS BLOOD BANK SERV XMATCH: CPT

## 2023-06-27 PROCEDURE — 99232 SBSQ HOSP IP/OBS MODERATE 35: CPT

## 2023-06-27 RX ORDER — SENNA PLUS 8.6 MG/1
2 TABLET ORAL AT BEDTIME
Refills: 0 | Status: DISCONTINUED | OUTPATIENT
Start: 2023-06-27 | End: 2023-06-28

## 2023-06-27 RX ORDER — FUROSEMIDE 40 MG
20 TABLET ORAL ONCE
Refills: 0 | Status: COMPLETED | OUTPATIENT
Start: 2023-06-27 | End: 2023-06-27

## 2023-06-27 RX ORDER — DIPHENHYDRAMINE HCL 50 MG
25 CAPSULE ORAL EVERY 6 HOURS
Refills: 0 | Status: DISCONTINUED | OUTPATIENT
Start: 2023-06-27 | End: 2023-06-28

## 2023-06-27 RX ORDER — ACETAMINOPHEN 500 MG
650 TABLET ORAL ONCE
Refills: 0 | Status: COMPLETED | OUTPATIENT
Start: 2023-06-27 | End: 2023-06-27

## 2023-06-27 RX ORDER — METOPROLOL TARTRATE 50 MG
50 TABLET ORAL DAILY
Refills: 0 | Status: DISCONTINUED | OUTPATIENT
Start: 2023-06-27 | End: 2023-06-28

## 2023-06-27 RX ORDER — CEFEPIME 1 G/1
1000 INJECTION, POWDER, FOR SOLUTION INTRAMUSCULAR; INTRAVENOUS EVERY 12 HOURS
Refills: 0 | Status: DISCONTINUED | OUTPATIENT
Start: 2023-06-27 | End: 2023-06-27

## 2023-06-27 RX ORDER — HYDROXYUREA 500 MG/1
1000 CAPSULE ORAL DAILY
Refills: 0 | Status: COMPLETED | OUTPATIENT
Start: 2023-06-27 | End: 2023-06-28

## 2023-06-27 RX ADMIN — Medication 650 MILLIGRAM(S): at 05:49

## 2023-06-27 RX ADMIN — Medication 25 MILLIGRAM(S): at 17:33

## 2023-06-27 RX ADMIN — Medication 1 APPLICATION(S): at 22:42

## 2023-06-27 RX ADMIN — Medication 25 MILLIGRAM(S): at 03:23

## 2023-06-27 RX ADMIN — Medication 25 MILLIGRAM(S): at 23:45

## 2023-06-27 RX ADMIN — Medication 81 MILLIGRAM(S): at 10:57

## 2023-06-27 RX ADMIN — HYDROXYUREA 1000 MILLIGRAM(S): 500 CAPSULE ORAL at 11:04

## 2023-06-27 RX ADMIN — Medication 40 MILLIGRAM(S): at 10:58

## 2023-06-27 RX ADMIN — Medication 650 MILLIGRAM(S): at 07:00

## 2023-06-27 RX ADMIN — ATORVASTATIN CALCIUM 10 MILLIGRAM(S): 80 TABLET, FILM COATED ORAL at 00:34

## 2023-06-27 RX ADMIN — RIVAROXABAN 20 MILLIGRAM(S): KIT at 17:33

## 2023-06-27 RX ADMIN — Medication 50 MILLIGRAM(S): at 17:33

## 2023-06-27 RX ADMIN — PANTOPRAZOLE SODIUM 40 MILLIGRAM(S): 20 TABLET, DELAYED RELEASE ORAL at 06:56

## 2023-06-27 RX ADMIN — ATORVASTATIN CALCIUM 10 MILLIGRAM(S): 80 TABLET, FILM COATED ORAL at 21:30

## 2023-06-27 RX ADMIN — Medication 5000 UNIT(S): at 10:58

## 2023-06-27 RX ADMIN — Medication 125 MICROGRAM(S): at 10:58

## 2023-06-27 NOTE — PATIENT PROFILE ADULT - FUNCTIONAL ASSESSMENT - BASIC MOBILITY 5.
As certified below, I, or a nurse practitioner or physician assistant working with me, had a face-to-face encounter that meets the physician face-to-face encounter requirements.
4 = No assist / stand by assistance

## 2023-06-27 NOTE — CONSULT NOTE ADULT - SUBJECTIVE AND OBJECTIVE BOX
Patient is a 72y old  Female who presents with a chief complaint of Allrgic reaction to Revlimid vs allopurinol  Thrombocytosis  Immunocompromised state (2023 08:55)    HPI:  Pt is a pleasant 71 y/o female w/ PMHx of CHF, myelofibrosis, Afib, anemia, COPD, kidney stones, PNA, pericardial effusion who was recently started on Revlimid and Allopurinol and  presented to [place] ED c/o allergic reaction w/ hives and swelling on her extremities. Pt  began having itching two - three weeks ago , followed by a rash.    She took her medications this morning at 7am and noticed the rash was getting worse.  She reported  swelling to her  legs, espec worse on the right.   No breathing difficulty or oral swelling.    She denies fever/chills,  no URI or UTI complaints,  no n/v/d , no abd pain, no chest  pain, no SOB.  Wbc ct 20. No clear infectious focus, given empiric vancomycin/cefepime.       PMH: as above  PSH: as above  Meds: per reconciliation sheet, noted below  MEDICATIONS  (STANDING):  aspirin enteric coated 81 milliGRAM(s) Oral daily  atorvastatin 10 milliGRAM(s) Oral at bedtime  buMETAnide 1 milliGRAM(s) Oral daily  cholecalciferol 5000 Unit(s) Oral daily  digoxin     Tablet 125 MICROGram(s) Oral daily  furosemide   Injectable 20 milliGRAM(s) IV Push once  hydroxyurea 1000 milliGRAM(s) Oral daily  methylPREDNISolone sodium succinate Injectable 40 milliGRAM(s) IV Push daily  metoprolol succinate ER 50 milliGRAM(s) Oral daily  pantoprazole    Tablet 40 milliGRAM(s) Oral before breakfast  rivaroxaban 20 milliGRAM(s) Oral with dinner      Allergies    No Known Allergies    Intolerances      Social: no smoking, no alcohol, no illegal drugs; no recent travel, no exposure to TB  FAMILY HISTORY:  FH: arthritis (Mother)       no history of premature cardiovascular disease in first degree relatives    ROS: the patient denies fever, no chills, no HA, no dizziness, no sore throat, no blurry vision, no CP, no palpitations, no SOB, no cough, no abdominal pain, no diarrhea, no N/V, no dysuria, no leg pain, no claudication, no joint aches, no rectal pain or bleeding, no night sweats    All other systems reviewed and are negative    Vital Signs Last 24 Hrs  T(C): 36.7 (2023 06:40), Max: 36.8 (2023 13:20)  T(F): 98.1 (2023 06:40), Max: 98.3 (2023 13:20)  HR: 74 (2023 06:40) (73 - 81)  BP: 86/49 (2023 06:40) (86/49 - 117/82)  BP(mean): 61 (2023 06:40) (61 - 92)  RR: 18 (2023 06:40) (18 - 20)  SpO2: 95% (2023 06:40) (94% - 97%)    Parameters below as of 2023 06:40  Patient On (Oxygen Delivery Method): room air      Daily Height in cm: 154.94 (2023 12:55)    Daily     PE:  Constitutional: NAD   HEENT: NC/AT, EOMI, PERRLA, conjunctivae clear; ears and nose atraumatic; pharynx benign  Neck: supple; thyroid not palpable  Back: no tenderness  Respiratory: respiratory effort normal; clear to auscultation  Cardiovascular: S1S2 regular, no murmurs  Abdomen: soft, not tender, not distended, positive BS; liver and spleen WNL  Genitourinary: no suprapubic tenderness  Lymphatic: no LN palpable  Musculoskeletal: no muscle tenderness, no joint swelling or tenderness  Extremities: no pedal edema  Neurological/ Psychiatric: AxOx3, Judgement and insight normal;  moving all extremities  Skin: hives/urticaria on chest/arms/legs no palpable lesions    Labs: all available labs reviewed                        6.5    15.77 )-----------( 901      ( 2023 01:45 )             19.8     06-26    132<L>  |  100  |  40<H>  ----------------------------<  113<H>  4.9   |  27  |  0.97    Ca    8.1<L>      2023 14:42    TPro  6.2  /  Alb  3.2<L>  /  TBili  0.5  /  DBili  x   /  AST  14<L>  /  ALT  21  /  AlkPhos  106  06-26     LIVER FUNCTIONS - ( 2023 14:42 )  Alb: 3.2 g/dL / Pro: 6.2 gm/dL / ALK PHOS: 106 U/L / ALT: 21 U/L / AST: 14 U/L / GGT: x           Urinalysis Basic - ( 2023 21:32 )    Color: Yellow / Appearance: Clear / S.010 / pH: x  Gluc: x / Ketone: Negative  / Bili: Negative / Urobili: Negative   Blood: x / Protein: Negative / Nitrite: Negative   Leuk Esterase: Trace / RBC: 3-5 /HPF / WBC 3-5 /HPF   Sq Epi: x / Non Sq Epi: x / Bacteria: Few          Radiology: all available radiological tests reviewed    Advanced directives addressed: full resuscitation
HPI:  73 y/o female w/ PMHx of CHF, MDS/myelofibrosis seen by DR. Ern[facility] recently on revlimid restarted this morning, Afib on xarelto, anemia, COPD, kidney stones, PNA, pericardial effusion presents to the ED c/o allergic reaction w/ hives and swelling on her extremities.     Pt initially had been diagnosed with myelofibrosis which dates back in 2016 whereby patient has been initially treated with Hydrea approximately as well as JAKAFI .  She has been stable for several years until approximately 2022. The patient at this point has been seen by [facility] primarily due to concern regarding possible transplant.Unfortunately as of recent she has been informed that she will not be proceeding with transplant due to antibodies per patient.     Pt was recently admitted and discharged one week ago for Hb 5.3, transfused a total of 3u pRBCs during admission.   She follows Wt[facility] DR. Ernesto Collado from [facility] for hematology and is transfused about every 1-2 weeks     Pt recently restarted Revlimid and Allopurinol and thinks it could be a reaction to the medication. Pt endorses swelling to her b/l legs, worse on right.    Pt given dose of steroids   Found to have WBC 20, Hb 8.9, plt 1.371 million  Case discussed with both Dr. Meyer and Dr. Collado this evening       PAST MEDICAL & SURGICAL HISTORY:  Myelofibrosis      PNA (pneumonia)      Anemia      Atrial fibrillation      Pericardial effusion      Kidney stone      COPD, mild      H/O CHF      On home oxygen therapy      No significant past surgical history          Allergies    No Known Allergies    Intolerances        MEDICATIONS  (STANDING):    MEDICATIONS  (PRN):      FAMILY HISTORY:  FH: arthritis (Mother)        SOCIAL HISTORY: No EtOH, no tobacco    REVIEW OF SYSTEMS:    CONSTITUTIONAL: No weakness, fevers or chills  EYES/ENT: No visual changes;  No vertigo or throat pain   NECK: No pain or stiffness  RESPIRATORY: No cough, wheezing, hemoptysis; No shortness of breath  CARDIOVASCULAR: No chest pain or palpitations  GASTROINTESTINAL: No abdominal or epigastric pain. No nausea, vomiting, or hematemesis; No diarrhea or constipation. No melena or hematochezia.  GENITOURINARY: No dysuria, frequency or hematuria  NEUROLOGICAL: No numbness or weakness  SKIN:+ itching, rash, hives   All other review of systems is negative unless indicated above.    Height (cm): 154.9 (06-26 @ 12:55)  Weight (kg): 63.5 (06-26 @ 12:55)  BMI (kg/m2): 26.5 (06-26 @ 12:55)  BSA (m2): 1.62 (06-26 @ 12:55)    T(F): 98.3 (06-26-23 @ 20:14), Max: 98.3 (06-26-23 @ 13:20)  HR: 79 (06-26-23 @ 20:14)  BP: 98/60 (06-26-23 @ 20:14)  RR: 20 (06-26-23 @ 20:14)  SpO2: 94% (06-26-23 @ 20:14)  Wt(kg): --    GENERAL: NAD,   HEAD:  Atraumatic, Normocephalic  EYES: EOMI,  CHEST/LUNG: Clear to auscultation bilaterally; No wheeze  HEART: Regular rate and rhythm;   ABDOMEN: Soft, Nontender, Nondistended  EXTREMITIES:  +excoriation, redness, mild hive in right arm, edema of hands   NEUROLOGY: non-focal                            8.9    20.15 )-----------( 1371     ( 26 Jun 2023 14:42 )             27.7       06-26    132<L>  |  100  |  40<H>  ----------------------------<  113<H>  4.9   |  27  |  0.97    Ca    8.1<L>      26 Jun 2023 14:42    TPro  6.2  /  Alb  3.2<L>  /  TBili  0.5  /  DBili  x   /  AST  14<L>  /  ALT  21  /  AlkPhos  106  06-26              Clean Catch Clean Catch (Midstream)  06-16 @ 22:00   <10,000 CFU/mL Normal Urogenital Tammi  --  --      .Blood Blood-Peripheral  06-16 @ 20:30   No Growth Final  --  --

## 2023-06-27 NOTE — PROVIDER CONTACT NOTE (CRITICAL VALUE NOTIFICATION) - SITUATION
Pt admitted with rash due to allergic reaction of medications. GIven 2L NS bolus & benadryl in the ED.

## 2023-06-27 NOTE — CONSULT NOTE ADULT - ASSESSMENT
73 y/o female w/ PMHx of CHF, myelofibrosis, Afib, anemia, COPD, kidney stones, PNA, pericardial effusion who was recently started on Revlimid and Allopurinol and  presented to [place] ED c/o allergic reaction w/ hives and swelling on her extremities. Pt  began having itching two - three weeks ago , followed by a rash.    She took her medications this morning at 7am and noticed the rash was getting worse.  She reported  swelling to her  legs, espec worse on the right.   No breathing difficulty or oral swelling.    She denies fever/chills,  no URI or UTI complaints,  no n/v/d , no abd pain, no chest  pain, no SOB.  Wbc ct 20. No clear infectious focus, given empiric vancomycin/cefepime.     1. Drug rash ? Revlimid vs allopurinol. Leukocytosis. Myelofibrosis  - imaging reviewed  - drug rash - benadryl/steroids  - no infectious focus  - wbc ct reactive improving  - dc abx and observe  - fu cbc  - supportive care    2. other issues - care per medicine 
73 y/o female w/ PMHx of CHF, MDS/myelofibrosis seen by DR. Ernesto Meyer at [facility] recently on revlimid restarted this morning, Afib on xarelto, anemia, COPD, kidney stones, PNA, pericardial effusion presents to the ED c/o allergic reaction w/ hives and swelling on her extremities.     # MF   - patient has been followed by [facility]   - would hold REVLIMID in the interim   - Found to have WBC 20, Hb 8.9, plt 1.371 million  - would initiate ASA 81 mg and xarelto due to high plt count- pt had outpatient VWF profile and was negative   - start hydroxyurea 1g qd x 3 days to help bring the plt count down- pt was on in the past but became refractory   - pt has appt outpatient with [facility] Dr. Ernesto Collado this week for f/u of Hb- has been getting transfused every 1-2 weeks outpatient   - continue to monitor for drop in Hb as well- keep type and screen active     # rash  - related to Revlimid as just restarted dose this am vs allopurinol   - pt states that she has been having itching for the past week since was even in the hospital= unclear source  - c/w iv steroids for treatment     Case discussed with both Dr. Meyer and Dr. Collado this evening     will continue to follow daily

## 2023-06-28 ENCOUNTER — TRANSCRIPTION ENCOUNTER (OUTPATIENT)
Age: 73
End: 2023-06-28

## 2023-06-28 VITALS
TEMPERATURE: 99 F | SYSTOLIC BLOOD PRESSURE: 107 MMHG | OXYGEN SATURATION: 96 % | DIASTOLIC BLOOD PRESSURE: 57 MMHG | RESPIRATION RATE: 18 BRPM | HEART RATE: 88 BPM

## 2023-06-28 LAB
ANION GAP SERPL CALC-SCNC: 4 MMOL/L — LOW (ref 5–17)
BUN SERPL-MCNC: 30 MG/DL — HIGH (ref 7–23)
CALCIUM SERPL-MCNC: 8.5 MG/DL — SIGNIFICANT CHANGE UP (ref 8.5–10.1)
CHLORIDE SERPL-SCNC: 110 MMOL/L — HIGH (ref 96–108)
CO2 SERPL-SCNC: 25 MMOL/L — SIGNIFICANT CHANGE UP (ref 22–31)
CREAT SERPL-MCNC: 0.69 MG/DL — SIGNIFICANT CHANGE UP (ref 0.5–1.3)
EGFR: 92 ML/MIN/1.73M2 — SIGNIFICANT CHANGE UP
GLUCOSE SERPL-MCNC: 95 MG/DL — SIGNIFICANT CHANGE UP (ref 70–99)
HCT VFR BLD CALC: 24.3 % — LOW (ref 34.5–45)
HGB BLD-MCNC: 7.6 G/DL — LOW (ref 11.5–15.5)
MCHC RBC-ENTMCNC: 30.3 PG — SIGNIFICANT CHANGE UP (ref 27–34)
MCHC RBC-ENTMCNC: 31.3 GM/DL — LOW (ref 32–36)
MCV RBC AUTO: 96.8 FL — SIGNIFICANT CHANGE UP (ref 80–100)
NRBC # BLD: 1 /100 WBCS — HIGH (ref 0–0)
PLATELET # BLD AUTO: 778 K/UL — HIGH (ref 150–400)
POTASSIUM SERPL-MCNC: 5.2 MMOL/L — SIGNIFICANT CHANGE UP (ref 3.5–5.3)
POTASSIUM SERPL-SCNC: 5.2 MMOL/L — SIGNIFICANT CHANGE UP (ref 3.5–5.3)
RBC # BLD: 2.51 M/UL — LOW (ref 3.8–5.2)
RBC # FLD: 20.3 % — HIGH (ref 10.3–14.5)
SODIUM SERPL-SCNC: 139 MMOL/L — SIGNIFICANT CHANGE UP (ref 135–145)
WBC # BLD: 17.14 K/UL — HIGH (ref 3.8–10.5)
WBC # FLD AUTO: 17.14 K/UL — HIGH (ref 3.8–10.5)

## 2023-06-28 PROCEDURE — 99239 HOSP IP/OBS DSCHRG MGMT >30: CPT

## 2023-06-28 RX ORDER — RIVAROXABAN 15 MG-20MG
1 KIT ORAL
Qty: 30 | Refills: 0
Start: 2023-06-28

## 2023-06-28 RX ORDER — ALLOPURINOL 300 MG
1 TABLET ORAL
Refills: 0 | DISCHARGE

## 2023-06-28 RX ORDER — LENALIDOMIDE 5 MG/1
1 CAPSULE ORAL
Refills: 0 | DISCHARGE

## 2023-06-28 RX ORDER — FUROSEMIDE 40 MG
20 TABLET ORAL ONCE
Refills: 0 | Status: COMPLETED | OUTPATIENT
Start: 2023-06-28 | End: 2023-06-28

## 2023-06-28 RX ORDER — DIPHENHYDRAMINE HCL 50 MG
1 CAPSULE ORAL
Qty: 0 | Refills: 0 | DISCHARGE
Start: 2023-06-28

## 2023-06-28 RX ADMIN — Medication 25 MILLIGRAM(S): at 18:23

## 2023-06-28 RX ADMIN — Medication 50 MILLIGRAM(S): at 09:41

## 2023-06-28 RX ADMIN — Medication 1 APPLICATION(S): at 09:40

## 2023-06-28 RX ADMIN — PANTOPRAZOLE SODIUM 40 MILLIGRAM(S): 20 TABLET, DELAYED RELEASE ORAL at 06:37

## 2023-06-28 RX ADMIN — Medication 25 MILLIGRAM(S): at 12:23

## 2023-06-28 RX ADMIN — RIVAROXABAN 20 MILLIGRAM(S): KIT at 17:10

## 2023-06-28 RX ADMIN — Medication 25 MILLIGRAM(S): at 05:45

## 2023-06-28 RX ADMIN — Medication 5000 UNIT(S): at 09:40

## 2023-06-28 RX ADMIN — HYDROXYUREA 1000 MILLIGRAM(S): 500 CAPSULE ORAL at 11:06

## 2023-06-28 RX ADMIN — Medication 20 MILLIGRAM(S): at 15:25

## 2023-06-28 RX ADMIN — Medication 125 MICROGRAM(S): at 09:41

## 2023-06-28 RX ADMIN — Medication 81 MILLIGRAM(S): at 09:41

## 2023-06-28 RX ADMIN — Medication 40 MILLIGRAM(S): at 09:40

## 2023-06-28 NOTE — DISCHARGE NOTE PROVIDER - NSDCMRMEDTOKEN_GEN_ALL_CORE_FT
Albuterol (Eqv-ProAir HFA) 90 mcg/inh inhalation aerosol: 2 puff(s) inhaled every 6 hours as needed for  shortness of breath and/or wheezing  Anoro Ellipta 62.5 mcg-25 mcg/inh inhalation powder: 1 puff(s) inhaled once a day as needed for  shortness of breath and/or wheezing  Aspir 81 oral delayed release tablet: 1 tab(s) orally once a day  atorvastatin 10 mg oral tablet: 1 tab(s) orally once a day (at bedtime)  bumetanide 1 mg oral tablet: 1 tab(s) orally 1 to 2 times a day  clobetasol 0.05% topical cream: Apply topically to affected area 2 times a day as needed for  itching 1 Apply topically to affected area 2 times a day  digoxin 125 mcg (0.125 mg) oral tablet: 1 tab(s) orally once a day  diphenhydrAMINE 25 mg oral capsule: 1 cap(s) orally every 8 hours as needed for Rash and/or Itching  Metoprolol Succinate ER 50 mg oral tablet, extended release: 1 tab(s) orally once a day  omeprazole 20 mg oral delayed release capsule: 1 tab(s) orally once a day  predniSONE 20 mg oral tablet: 2 tab(s) orally once a day  Vitamin D3 125 mcg (5000 intl units) oral capsule: 1 cap(s) orally once a day

## 2023-06-28 NOTE — DISCHARGE NOTE NURSING/CASE MANAGEMENT/SOCIAL WORK - PATIENT PORTAL LINK FT
You can access the FollowMyHealth Patient Portal offered by [facility] by registering at the following website: [email or web address]. By joining [employer]’s FollowMyHealth portal, you will also be able to view your health information using other applications (apps) compatible with our system.

## 2023-06-28 NOTE — DISCHARGE NOTE PROVIDER - PROVIDER TOKENS
FREE:[LAST:[your allergist],PHONE:[(   )    -],FAX:[(   )    -],ADDRESS:[thursday]],FREE:[LAST:[hematologist/oncologist],PHONE:[(   )    -],FAX:[(   )    -],ADDRESS:[as an outpatient]],PROVIDER:[TOKEN:[[id number]]]

## 2023-06-28 NOTE — DISCHARGE NOTE PROVIDER - NSDCCPCAREPLAN_GEN_ALL_CORE_FT
PRINCIPAL DISCHARGE DIAGNOSIS  Diagnosis: Rash  Assessment and Plan of Treatment: possible allergic reaction to revlimid and allopurinol  c/w steroid   f/u with allergist  Your revlimid and allopurinol are on hold. F/U with hematologist when this should be restarted.   Your allergist will manage your steroid taper  benadryk as needed for itching- this is over the counter  Anemia  - you received 2 units of blood  - f/u with hematolohgist as an outpatient  THrombocytosis  - you finihsed taking hydroxyurea- you dont need further treamtnet        SECONDARY DISCHARGE DIAGNOSES  Diagnosis: Thrombocytosis  Assessment and Plan of Treatment:

## 2023-06-28 NOTE — DISCHARGE NOTE PROVIDER - ATTENDING DISCHARGE PHYSICAL EXAMINATION:
Patient seen and examined with MASON Regalado.  I was physically present for the key portions of the evaluation and management (E/M) service provided.  I agree with the above history, physical, and plan which I have reviewed and edited where appropriate.   - transfused 2 units PRBC during this admission  - outtp f/u with heme  - allergy appt tomorrow  - dc on prednisone 40 mg daily and taper as outpt.

## 2023-06-28 NOTE — CDI QUERY NOTE - NSCDIOTHERTXTBX_GEN_ALL_CORE_HH
Clinical documentation indicates that this patient has CHF.      SUPPORTING DOCUMENTATION AND/OR CLINICAL EVIDENCE:    TTE Echo Complete w/o Contrast w/ Doppler (03.15.23 @ 11:22) The left ventricle is normal in size, wall thickness, wall motion and  contractility.  Estimated left ventricular ejection fraction is 60%. IVC is dilated and not collapsing with inspiration. Pleural effusion - is present.    HP Adult 6/26/2023  PMHx of CHF  Home Medications:   bumetanide 1 mg oral tablet: Last Dose Taken:  , 1 tab(s) orally 1 to 2 times a day    Please clarify the type of CHF?     - Chronic Diastolic (HFpEF)  - Other (specify)  - Unable to determine

## 2023-06-28 NOTE — CDI QUERY NOTE - NSCDIOTHERTXTBX2_GEN_ALL_CORE_FT
Clinical documentation indicates that this patient has atrial fibrillation.      SUPPORTING DOCUMENTATION AND/OR CLINICAL EVIDENCE:    12 Lead ECG (06.26.23 @ 22:58) Normal sinus rhythm with sinus arrhythmia    12 Lead ECG (06.16.23 @ 21:19) Sinus rhythm with Premature atrial complexes    12 Lead ECG (03.14.23 @ 21:56) Atrial fibrillation with rapid ventricular response    HP Adult 6/26/2023   PMH: Atrial fibrillation   -  ASA 81 mg and xarelto due to high plt count- pt had outpatient VWF profile and was negative     Heme-Onc Consult 6/26/2023   Afib on xarelto    Please further specify:    - Paroxysmal atrial fibrillation  - Other (please specify) _________.  - Unable to determine

## 2023-06-28 NOTE — PROGRESS NOTE ADULT - SUBJECTIVE AND OBJECTIVE BOX
73 y/o female w/ PMHx of CHF, myelofibrosis, Afib, anemia, COPD, kidney stones, PNA, pericardial effusion who was recently started on Revlimid and Allopurinol and  presented to [facility] ED c/o allergic reaction w/ hives and swelling on her extremities. Pt  began having itching two - three weeks ago , followed by a rash.  She took her medications on the day of admission at 7am and noticed the rash was getting worse.  She reported  swelling to her  legs, especially  worse on the right.   No breathing difficulty or oral swelling.    She denies fever/chills,  no URI or UTI complaints,  no n/v/d , no abd pain, no chest  pain, no SOB.  Wbc ct 20. No clear infectious focus, given empiric vancomycin/cefepime.       6/27- patient was seen and examined. as per report rashes seems to have improved +pruritus    Vital Signs Last 24 Hrs  T(C): 36.6 (27 Jun 2023 10:31), Max: 36.8 (26 Jun 2023 16:08)  T(F): 97.9 (27 Jun 2023 10:31), Max: 98.3 (26 Jun 2023 20:14)  HR: 88 (27 Jun 2023 10:31) (73 - 88)  BP: 88/43 (27 Jun 2023 10:31) (86/49 - 98/61)  BP(mean): 61 (27 Jun 2023 06:40) (61 - 73)  RR: 18 (27 Jun 2023 10:31) (18 - 20)  SpO2: 97% (27 Jun 2023 10:31) (94% - 97%)    Parameters below as of 27 Jun 2023 10:31  Patient On (Oxygen Delivery Method): room air    ROS:   All 10 systems reviewed and found to be negative with the exception of what has been described above.     PE:  Constitutional: NAD, laying in bed  HEENT: NC/AT  Back: no tenderness  Respiratory: respirations even and non labored, LCTA  Cardiovascular: S1S2 regular, no murmurs  Abdomen: soft, not tender, not distended, positive BS  Genitourinary: voiding  Rectal: deferred  Musculoskeletal: no muscle tenderness, no joint swelling or tenderness  Extremities: no pedal edema   Neurological: no focal deficits  SKIN: erythematous macular reticular pruritic rash on legs (R>L) , thighs and upper chest and neck- improved as per report       MEDICATIONS  (STANDING):  aspirin enteric coated 81 milliGRAM(s) Oral daily  atorvastatin 10 milliGRAM(s) Oral at bedtime  buMETAnide 1 milliGRAM(s) Oral daily  cholecalciferol 5000 Unit(s) Oral daily  digoxin     Tablet 125 MICROGram(s) Oral daily  furosemide   Injectable 20 milliGRAM(s) IV Push once  hydroxyurea 1000 milliGRAM(s) Oral daily  methylPREDNISolone sodium succinate Injectable 40 milliGRAM(s) IV Push daily  metoprolol succinate ER 50 milliGRAM(s) Oral daily  pantoprazole    Tablet 40 milliGRAM(s) Oral before breakfast  rivaroxaban 20 milliGRAM(s) Oral with dinner    MEDICATIONS  (PRN):  albuterol    90 MICROgram(s) HFA Inhaler 2 Puff(s) Inhalation every 6 hours PRN for shortness of breath and/or wheezing  diphenhydrAMINE 25 milliGRAM(s) Oral every 6 hours PRN Rash and/or Itching  senna 2 Tablet(s) Oral at bedtime PRN Constipation      06-27    134<L>  |  105  |  34<H>  ----------------------------<  186<H>  4.2   |  24  |  0.82    Ca    7.8<L>      27 Jun 2023 11:59    TPro  6.2  /  Alb  3.2<L>  /  TBili  0.5  /  DBili  x   /  AST  14<L>  /  ALT  21  /  AlkPhos  106  06-26                            7.3    13.37 )-----------( 806      ( 27 Jun 2023 11:59 )             22.7         
INTERVAL HPI/OVERNIGHT EVENTS:  Patient S&E at bedside.   Overnight, pt with drop in Hb to 6.5  pt ordered for 1u pRBC and currently running  FOBT negative   hives gone, rash still present on chest and legs still with itching     PAST MEDICAL & SURGICAL HISTORY:  Myelofibrosis      PNA (pneumonia)      Anemia      Atrial fibrillation      Pericardial effusion      Kidney stone      COPD, mild      H/O CHF      On home oxygen therapy      No significant past surgical history          FAMILY HISTORY:  FH: arthritis (Mother)        VITAL SIGNS:  T(F): 98.1 (23 @ 06:40)  HR: 74 (23 @ 06:40)  BP: 86/49 (23 @ 06:40)  RR: 18 (23 @ 06:40)  SpO2: 95% (23 @ 06:40)  Wt(kg): --    PHYSICAL EXAM:  GENERAL: NAD,   CHEST/LUNG: Clear to auscultation bilaterally; No wheeze  HEART: Regular rate and rhythm;   ABDOMEN: Soft, Nontender, Nondistended  EXTREMITIES:  +excoriation, redness, hives resolved, edema of hands improved  NEUROLOGY: non-focal    MEDICATIONS  (STANDING):  aspirin enteric coated 81 milliGRAM(s) Oral daily  atorvastatin 10 milliGRAM(s) Oral at bedtime  buMETAnide 1 milliGRAM(s) Oral daily  cefepime  Injectable. 1000 milliGRAM(s) IV Push every 12 hours  cholecalciferol 5000 Unit(s) Oral daily  digoxin     Tablet 125 MICROGram(s) Oral daily  furosemide   Injectable 20 milliGRAM(s) IV Push once  hydroxyurea 1000 milliGRAM(s) Oral daily  methylPREDNISolone sodium succinate Injectable 40 milliGRAM(s) IV Push daily  metoprolol succinate ER 50 milliGRAM(s) Oral daily  pantoprazole    Tablet 40 milliGRAM(s) Oral before breakfast  rivaroxaban 20 milliGRAM(s) Oral with dinner    MEDICATIONS  (PRN):  albuterol    90 MICROgram(s) HFA Inhaler 2 Puff(s) Inhalation every 6 hours PRN for shortness of breath and/or wheezing  diphenhydrAMINE 25 milliGRAM(s) Oral every 6 hours PRN Rash and/or Itching  senna 2 Tablet(s) Oral at bedtime PRN Constipation      Allergies    No Known Allergies    Intolerances        LABS:                        6.5    15.77 )-----------( 901      ( 2023 01:45 )             19.8         132<L>  |  100  |  40<H>  ----------------------------<  113<H>  4.9   |  27  |  0.97    Ca    8.1<L>      2023 14:42    TPro  6.2  /  Alb  3.2<L>  /  TBili  0.5  /  DBili  x   /  AST  14<L>  /  ALT  21  /  AlkPhos  106        Urinalysis Basic - ( 2023 21:32 )    Color: Yellow / Appearance: Clear / S.010 / pH: x  Gluc: x / Ketone: Negative  / Bili: Negative / Urobili: Negative   Blood: x / Protein: Negative / Nitrite: Negative   Leuk Esterase: Trace / RBC: 3-5 /HPF / WBC 3-5 /HPF   Sq Epi: x / Non Sq Epi: x / Bacteria: Few        RADIOLOGY & ADDITIONAL TESTS:  Studies reviewed.  
INTERVAL HPI/OVERNIGHT EVENTS:  Patient S&E at bedside.   Overnight, pt remained uncomfortable with itching and rash   pt with slight improvement this am but remains on right leg and chest- has appt with allergy tomorrow and heme  Hb 7.6 today will give 1u today prior to dc        PAST MEDICAL & SURGICAL HISTORY:  Myelofibrosis      PNA (pneumonia)      Anemia      Atrial fibrillation      Pericardial effusion      Kidney stone      COPD, mild      H/O CHF      On home oxygen therapy      No significant past surgical history          FAMILY HISTORY:  FH: arthritis (Mother)        VITAL SIGNS:  T(F): 97.5 (06-28-23 @ 07:21)  HR: 81 (06-28-23 @ 07:21)  BP: 96/52 (06-28-23 @ 07:21)  RR: 19 (06-28-23 @ 07:21)  SpO2: 98% (06-28-23 @ 07:21)  Wt(kg): --    PHYSICAL EXAM:  GENERAL: NAD,   CHEST/LUNG: Clear to auscultation bilaterally; No wheeze  HEART: Regular rate and rhythm;   ABDOMEN: Soft, Nontender, Nondistended  EXTREMITIES:  +redness and rash on legs and chest, hives resolved, edema of hands improved  NEUROLOGY: non-focal      MEDICATIONS  (STANDING):  aspirin enteric coated 81 milliGRAM(s) Oral daily  atorvastatin 10 milliGRAM(s) Oral at bedtime  buMETAnide 1 milliGRAM(s) Oral daily  cholecalciferol 5000 Unit(s) Oral daily  clobetasol 0.05% Cream 1 Application(s) Topical two times a day  digoxin     Tablet 125 MICROGram(s) Oral daily  hydroxyurea 1000 milliGRAM(s) Oral daily  methylPREDNISolone sodium succinate Injectable 40 milliGRAM(s) IV Push daily  metoprolol succinate ER 50 milliGRAM(s) Oral daily  pantoprazole    Tablet 40 milliGRAM(s) Oral before breakfast  rivaroxaban 20 milliGRAM(s) Oral with dinner    MEDICATIONS  (PRN):  albuterol    90 MICROgram(s) HFA Inhaler 2 Puff(s) Inhalation every 6 hours PRN for shortness of breath and/or wheezing  diphenhydrAMINE 25 milliGRAM(s) Oral every 6 hours PRN Rash and/or Itching  senna 2 Tablet(s) Oral at bedtime PRN Constipation      Allergies    No Known Allergies    Intolerances        LABS:                        7.6    17.14 )-----------( 778      ( 28 Jun 2023 06:54 )             24.3     06-28    139  |  110<H>  |  30<H>  ----------------------------<  95  5.2   |  25  |  0.69    Ca    8.5      28 Jun 2023 06:54    TPro  6.2  /  Alb  3.2<L>  /  TBili  0.5  /  DBili  x   /  AST  14<L>  /  ALT  21  /  AlkPhos  106  06-26      Urinalysis Basic - ( 28 Jun 2023 06:54 )    Color: x / Appearance: x / SG: x / pH: x  Gluc: 95 mg/dL / Ketone: x  / Bili: x / Urobili: x   Blood: x / Protein: x / Nitrite: x   Leuk Esterase: x / RBC: x / WBC x   Sq Epi: x / Non Sq Epi: x / Bacteria: x        RADIOLOGY & ADDITIONAL TESTS:  Studies reviewed.

## 2023-06-28 NOTE — PROGRESS NOTE ADULT - REASON FOR ADMISSION
Allrgic reaction to Revlimid vs allopurinol  Thrombocytosis  Immunocompromised state

## 2023-06-28 NOTE — PROGRESS NOTE ADULT - ASSESSMENT
73 y/o female w/ PMHx of CHF, MDS/myelofibrosis seen by DR. Ernesto Meyer at [facility] recently on revlimid restarted this morning, Afib on xarelto, anemia, COPD, kidney stones, PNA, pericardial effusion presents to the ED c/o allergic reaction w/ hives and swelling on her extremities.     # MF   - patient has been followed by [facility]   - would hold REVLIMID in the interim as took one dose yesterday am and developed symptoms - hold allopurinol  - Found to have WBC 20, Hb 8.9, plt 1.371 million  - ASA 81 mg and xarelto due to high plt count- pt had outpatient VWF profile and was negative   - c/w hydroxyurea 1g qd x 3 days- D3/3 today  - continue to monitor for drop in Hb as well- keep type and screen active  - Hb 6.5- given 1u pRBC yesterday- 7.4 today- will transfuse 1u prior to dc today  - plt count lower today to 901k->778k today  - pt has appt outpatient with [facility] Dr. Ernesto Collado this week on Thurs for f/u of Hb- has been getting transfused every 1-2 weeks outpatient     # rash- revlimid? allopurinol?- both being held  - related to Revlimid as just restarted dose this am vs allopurinol   - pt states that she has been having itching for the past week since was even in the hospital= unclear source  - c/w steroids for treatment on discharge  - pt has appt allergist on Thurs   - would dc abx - UA negative- no sign of infection- ID agrees    Case discussed with both Dr. Meyer and Dr. Collado     dc today 
71 y/o female w/ PMHx of CHF, MDS/myelofibrosis seen by DR. Ernesto Meyer at [facility] recently on revlimid restarted this morning, Afib on xarelto, anemia, COPD, kidney stones, PNA, pericardial effusion presents to the ED c/o allergic reaction w/ hives and swelling on her extremities.     # MF   - patient has been followed by [facility]   - would hold REVLIMID in the interim as took one dose yesterday am and developed symptoms - hold allopurinol  - Found to have WBC 20, Hb 8.9, plt 1.371 million  - would initiate ASA 81 mg and xarelto due to high plt count- pt had outpatient VWF profile and was negative   - start hydroxyurea 1g qd x 3 days to help bring the plt count down- pt was on in the past but became refractory - can drop Hb as well   - continue to monitor for drop in Hb as well- keep type and screen active   - Hb 6.5 this am- given 1u pRBC running currently  - plt count lower today to 901k  - pt has appt outpatient with [facility] Dr. Ernesto Collado this week on Thurs for f/u of Hb- has been getting transfused every 1-2 weeks outpatient     # rash- revlimid? allopurinol?  - related to Revlimid as just restarted dose this am vs allopurinol   - pt states that she has been having itching for the past week since was even in the hospital= unclear source  - c/w iv steroids for treatment   - pt has appt allergist on Thurs   - would dc abx - UA negative- no sign of infection    Case discussed with both Dr. Meyer and Dr. Collado this evening     will continue to follow daily 
Allergic reaction to Revlimid vs allopurinol  Thrombocytosis   Immunocompromised state  Hypotension to 85 and 90s systolic  - history of myelfibrosis   - hold REVLIMID and allopurinol as per Hematology recs   - s/p 2 L IVF in the ED  - s/p prednisone 4omg in the ED, cont with Solumedrol 40 mg iv Qday  - Benadryl as needed  - Hydrocortisone cream for pruritis - topical   - s/p Vanco Zosyn in the ED, will cont with Cefepime and f/u cx,  no fever/chills, no source of infection isolated as yet- DC as per ID recs- no signs of infection- monitor off antibiotic   - s/p Hydroxyurea 1000mg in the ED, cont for 2 more days  to help bring the plt count down-  - ASA 81 mg and xarelto due to high plt count  - pt had outpatient VWF profile and was negative   - follow AM labs and dig level  - apprec Oncology consult  - leukocytosis likley reactive- improving  - Thrombocytosis improving   - pt has appt outpatient with [facility] Dr. Ernesto Collado this week on Thurs for f/u of Hb- has been getting transfused every 1-2 weeks outpatient             Anemia, hx of monthly transfusions  - repeat is Hemoglobin 6.5 tonight  - s/p 1 unit PRBCs  - repeat Hgb >7      - DVT proph on xaralto    Case d/w team on IDR.

## 2023-06-28 NOTE — DISCHARGE NOTE PROVIDER - HOSPITAL COURSE
71 y/o female w/ PMHx of CHF, myelofibrosis, Afib, anemia, COPD, kidney stones, PNA, pericardial effusion who was recently started on Revlimid and Allopurinol and  presented to [facility] ED c/o allergic reaction w/ hives and swelling on her extremities. Pt  began having itching two - three weeks ago , followed by a rash.  She took her medications on the day of admission at 7am and noticed the rash was getting worse.  She reported  swelling to her  legs, especially  worse on the right.   No breathing difficulty or oral swelling.    She denies fever/chills,  no URI or UTI complaints,  no n/v/d , no abd pain, no chest  pain, no SOB.  Wbc ct 20. No clear infectious focus, given empiric vancomycin/cefepime- this was subsequently dc'd. Patient received IV steroids. Hematology/Oncology consulted. S/p 2U PRBC transfusion. Revlimid and allopurinol held.     6/28-  patient was seen and examined, Rashes improved as per patient report. Denies SOB, pain or fever. +itching on BLE.    Vital Signs Last 24 Hrs  T(C): 37.1 (28 Jun 2023 12:11), Max: 37.1 (28 Jun 2023 12:11)  T(F): 98.8 (28 Jun 2023 12:11), Max: 98.8 (28 Jun 2023 12:11)  HR: 90 (28 Jun 2023 12:11) (81 - 90)  BP: 98/70 (28 Jun 2023 12:11) (96/52 - 116/65)  BP(mean): --  RR: 18 (28 Jun 2023 12:11) (18 - 19)  SpO2: 96% (28 Jun 2023 12:11) (94% - 98%)    Parameters below as of 28 Jun 2023 12:11  Patient On (Oxygen Delivery Method): room air    ROS:   All 10 systems reviewed and found to be negative with the exception of what has been described above.     PE:  Constitutional: NAD, laying in bed  HEENT: NC/AT  Back: no tenderness  Respiratory: respirations even and non labored, LCTA  Cardiovascular: S1S2 regular, no murmurs  Abdomen: soft, not tender, not distended, positive BS  Genitourinary: voiding  Rectal: deferred  Musculoskeletal: no muscle tenderness, no joint swelling or tenderness  Extremities: no pedal edema   Neurological: no focal deficits  SKIN: erythematous macular reticular pruritic rash on legs (R>L) , thighs and upper chest and neck- improved as per report     Meds/Labs- reviewed    PLAN  Allergic reaction to Revlimid vs allopurinol  Thrombocytosis - improving   Immunocompromised state  Hypotension to 85 and 90s systolic- improved   - history of myelfibrosis   - hold REVLIMID and allopurinol as per Hematology recs -will need f/u with outpatient MD when this should be restarted   - s/p 2 L IVF in the ED  - s/p prednisone 4omg in the ED, cont with Solumedrol 40 mg iv Qday- dc on oral steroid- patient has a f/u with Allergist on 6/29   - Benadryl as needed  - Hydrocortisone cream for pruritis - topical   - s/p Vanco, Zosyn in the ED, will cont with Cefepime and f/u cx,  no fever/chills, no source of infection isolated as yet- DC as per ID recs- no signs of infection- monitor off antibiotic   - s/p Hydroxyurea 1000mg in the ED, cont for 2 more days  to help bring the plt count down-  - ASA 81 mg and xarelto due to high plt count  - pt had outpatient VWF profile and was negative   - apprec Oncology consult  - leukocytosis likely reactive- improving  - Thrombocytosis improving   - pt has appt outpatient with [facility] Dr. Ernesto Collado this week on Thurs for f/u of Hb- has been getting transfused every 1-2 weeks outpatient  - Plan was d/w Dr Meza       *Paroxysmal AF- rate controlled  - c/w dig and xarelto     Anemia, hx of monthly transfusions  - repeat is Hemoglobin 6.5 tonight  - s/p 2 unit PRBCs with improvement on Hgb   - repeat Hgb >7  - Lasix post transfusion       - DVT proph on xaralto    Case d/w team on IDR.   Plan for  dc home today with instructions to f/u with outpatient provider this week. Plan was d/w patient and her  at the bedside            71 y/o female w/ PMHx of CHF, myelofibrosis, Afib, anemia, COPD, kidney stones, PNA, pericardial effusion who was recently started on Revlimid and Allopurinol and  presented to [place] ED c/o allergic reaction w/ hives and swelling on her extremities. Pt  began having itching two - three weeks ago , followed by a rash.  She took her medications on the day of admission at 7am and noticed the rash was getting worse.  She reported  swelling to her  legs, especially  worse on the right.   No breathing difficulty or oral swelling.    She denies fever/chills,  no URI or UTI complaints,  no n/v/d , no abd pain, no chest  pain, no SOB.  Wbc ct 20. No clear infectious focus, given empiric vancomycin/cefepime- this was subsequently dc'd. Patient received IV steroids. Hematology/Oncology consulted. S/p 2U PRBC transfusion. Revlimid and allopurinol held.     6/28-  patient was seen and examined, Rashes improved as per patient report. Denies SOB, pain or fever. +itching on BLE.    Vital Signs Last 24 Hrs  T(C): 37.1 (28 Jun 2023 12:11), Max: 37.1 (28 Jun 2023 12:11)  T(F): 98.8 (28 Jun 2023 12:11), Max: 98.8 (28 Jun 2023 12:11)  HR: 90 (28 Jun 2023 12:11) (81 - 90)  BP: 98/70 (28 Jun 2023 12:11) (96/52 - 116/65)  BP(mean): --  RR: 18 (28 Jun 2023 12:11) (18 - 19)  SpO2: 96% (28 Jun 2023 12:11) (94% - 98%)    Parameters below as of 28 Jun 2023 12:11  Patient On (Oxygen Delivery Method): room air    ROS:   All 10 systems reviewed and found to be negative with the exception of what has been described above.     PE:  Constitutional: NAD, laying in bed  HEENT: NC/AT  Back: no tenderness  Respiratory: respirations even and non labored, LCTA  Cardiovascular: S1S2 regular, no murmurs  Abdomen: soft, not tender, not distended, positive BS  Genitourinary: voiding  Rectal: deferred  Musculoskeletal: no muscle tenderness, no joint swelling or tenderness  Extremities: no pedal edema   Neurological: no focal deficits  SKIN: erythematous macular reticular pruritic rash on legs (R>L) , thighs and upper chest and neck- improved as per report     Meds/Labs- reviewed    PLAN  Allergic reaction to Revlimid vs allopurinol  Thrombocytosis - improving   Immunocompromised state  Hypotension to 85 and 90s systolic- improved   - history of myelfibrosis   - hold REVLIMID and allopurinol as per Hematology recs -will need f/u with outpatient MD when this should be restarted   - s/p 2 L IVF in the ED  - s/p prednisone 4omg in the ED, cont with Solumedrol 40 mg iv Qday- dc on oral steroid- patient has a f/u with Allergist on 6/29   - Benadryl as needed  - Hydrocortisone cream for pruritis - topical   - s/p Vanco, Zosyn in the ED, will cont with Cefepime and f/u cx,  no fever/chills, no source of infection isolated as yet- DC as per ID recs- no signs of infection- monitor off antibiotic   - s/p Hydroxyurea 1000mg in the ED, cont for 2 more days  to help bring the plt count down-  - ASA 81 mg and xarelto due to high plt count  - pt had outpatient VWF profile and was negative   - apprec Oncology consult  - leukocytosis likely reactive- improving  - Thrombocytosis improving   - pt has appt outpatient with [facility] Dr. Ernesto Collado this week on Thurs for f/u of Hb- has been getting transfused every 1-2 weeks outpatient  - Plan was d/w Dr Meza       *Paroxysmal AF- rate controlled  - c/w dig and xarelto     Chronic Diastolic (HFpEF)  - c/w bumex  - not in exacerbation     Anemia, hx of monthly transfusions  - repeat is Hemoglobin 6.5 tonight  - s/p 2 unit PRBCs with improvement on Hgb   - repeat Hgb >7  - Lasix post transfusion       - DVT proph on xaralto    Case d/w team on IDR.   Plan for  dc home today with instructions to f/u with outpatient provider this week. Plan was d/w patient and her  at the bedside

## 2023-06-28 NOTE — DISCHARGE NOTE PROVIDER - CARE PROVIDER_API CALL
your allergist,   thursday  Phone: (   )    -  Fax: (   )    -  Follow Up Time:     hematologist/oncologist,   as an outpatient  Phone: (   )    -  Fax: (   )    -  Follow Up Time:     Preston Varma  Internal Medicine  [address] [place], [place] [address]  Phone: ()-  Fax: ()-  Follow Up Time:

## 2023-07-05 DIAGNOSIS — D75.81 MYELOFIBROSIS: ICD-10-CM

## 2023-07-05 DIAGNOSIS — D84.9 IMMUNODEFICIENCY, UNSPECIFIED: ICD-10-CM

## 2023-07-05 DIAGNOSIS — T45.1X5A ADVERSE EFFECT OF ANTINEOPLASTIC AND IMMUNOSUPPRESSIVE DRUGS, INITIAL ENCOUNTER: ICD-10-CM

## 2023-07-05 DIAGNOSIS — D63.0 ANEMIA IN NEOPLASTIC DISEASE: ICD-10-CM

## 2023-07-05 DIAGNOSIS — I48.0 PAROXYSMAL ATRIAL FIBRILLATION: ICD-10-CM

## 2023-07-05 DIAGNOSIS — Z87.442 PERSONAL HISTORY OF URINARY CALCULI: ICD-10-CM

## 2023-07-05 DIAGNOSIS — Z79.01 LONG TERM (CURRENT) USE OF ANTICOAGULANTS: ICD-10-CM

## 2023-07-05 DIAGNOSIS — Z87.891 PERSONAL HISTORY OF NICOTINE DEPENDENCE: ICD-10-CM

## 2023-07-05 DIAGNOSIS — M79.89 OTHER SPECIFIED SOFT TISSUE DISORDERS: ICD-10-CM

## 2023-07-05 DIAGNOSIS — Z79.82 LONG TERM (CURRENT) USE OF ASPIRIN: ICD-10-CM

## 2023-07-05 DIAGNOSIS — J44.9 CHRONIC OBSTRUCTIVE PULMONARY DISEASE, UNSPECIFIED: ICD-10-CM

## 2023-07-05 DIAGNOSIS — L50.0 ALLERGIC URTICARIA: ICD-10-CM

## 2023-07-05 DIAGNOSIS — I50.32 CHRONIC DIASTOLIC (CONGESTIVE) HEART FAILURE: ICD-10-CM

## 2023-07-05 DIAGNOSIS — I95.9 HYPOTENSION, UNSPECIFIED: ICD-10-CM

## 2023-07-05 DIAGNOSIS — T50.4X5A ADVERSE EFFECT OF DRUGS AFFECTING URIC ACID METABOLISM, INITIAL ENCOUNTER: ICD-10-CM

## 2023-07-05 DIAGNOSIS — Z99.81 DEPENDENCE ON SUPPLEMENTAL OXYGEN: ICD-10-CM

## 2023-07-05 DIAGNOSIS — D46.9 MYELODYSPLASTIC SYNDROME, UNSPECIFIED: ICD-10-CM

## 2023-07-05 DIAGNOSIS — D75.839 THROMBOCYTOSIS, UNSPECIFIED: ICD-10-CM

## 2023-08-08 ENCOUNTER — EMERGENCY (EMERGENCY)
Facility: HOSPITAL | Age: 73
LOS: 0 days | Discharge: HOME | End: 2023-08-08
Attending: STUDENT IN AN ORGANIZED HEALTH CARE EDUCATION/TRAINING PROGRAM
Payer: MEDICARE

## 2023-08-08 VITALS
RESPIRATION RATE: 18 BRPM | HEIGHT: 60 IN | OXYGEN SATURATION: 94 % | WEIGHT: 117.07 LBS | SYSTOLIC BLOOD PRESSURE: 127 MMHG | DIASTOLIC BLOOD PRESSURE: 89 MMHG | TEMPERATURE: 98 F | HEART RATE: 87 BPM

## 2023-08-08 VITALS
RESPIRATION RATE: 19 BRPM | OXYGEN SATURATION: 100 % | HEART RATE: 88 BPM | TEMPERATURE: 99 F | SYSTOLIC BLOOD PRESSURE: 111 MMHG | DIASTOLIC BLOOD PRESSURE: 56 MMHG

## 2023-08-08 DIAGNOSIS — D64.9 ANEMIA, UNSPECIFIED: ICD-10-CM

## 2023-08-08 DIAGNOSIS — R06.02 SHORTNESS OF BREATH: ICD-10-CM

## 2023-08-08 DIAGNOSIS — R53.83 OTHER FATIGUE: ICD-10-CM

## 2023-08-08 DIAGNOSIS — D75.81 MYELOFIBROSIS: ICD-10-CM

## 2023-08-08 DIAGNOSIS — I50.9 HEART FAILURE, UNSPECIFIED: ICD-10-CM

## 2023-08-08 DIAGNOSIS — Z87.01 PERSONAL HISTORY OF PNEUMONIA (RECURRENT): ICD-10-CM

## 2023-08-08 DIAGNOSIS — Z79.82 LONG TERM (CURRENT) USE OF ASPIRIN: ICD-10-CM

## 2023-08-08 DIAGNOSIS — Z99.81 DEPENDENCE ON SUPPLEMENTAL OXYGEN: ICD-10-CM

## 2023-08-08 DIAGNOSIS — J44.9 CHRONIC OBSTRUCTIVE PULMONARY DISEASE, UNSPECIFIED: ICD-10-CM

## 2023-08-08 DIAGNOSIS — Z87.442 PERSONAL HISTORY OF URINARY CALCULI: ICD-10-CM

## 2023-08-08 DIAGNOSIS — Z86.2 PERSONAL HISTORY OF DISEASES OF THE BLOOD AND BLOOD-FORMING ORGANS AND CERTAIN DISORDERS INVOLVING THE IMMUNE MECHANISM: ICD-10-CM

## 2023-08-08 DIAGNOSIS — I48.91 UNSPECIFIED ATRIAL FIBRILLATION: ICD-10-CM

## 2023-08-08 DIAGNOSIS — Z79.01 LONG TERM (CURRENT) USE OF ANTICOAGULANTS: ICD-10-CM

## 2023-08-08 LAB
ALBUMIN SERPL ELPH-MCNC: 3.7 G/DL — SIGNIFICANT CHANGE UP (ref 3.3–5)
ALP SERPL-CCNC: 249 U/L — HIGH (ref 40–120)
ALT FLD-CCNC: 29 U/L — SIGNIFICANT CHANGE UP (ref 12–78)
ANION GAP SERPL CALC-SCNC: 5 MMOL/L — SIGNIFICANT CHANGE UP (ref 5–17)
ANISOCYTOSIS BLD QL: SLIGHT — SIGNIFICANT CHANGE UP
APTT BLD: 42 SEC — HIGH (ref 24.5–35.6)
AST SERPL-CCNC: 20 U/L — SIGNIFICANT CHANGE UP (ref 15–37)
BASOPHILS # BLD AUTO: 0.1 K/UL — SIGNIFICANT CHANGE UP (ref 0–0.2)
BASOPHILS NFR BLD AUTO: 1.1 % — SIGNIFICANT CHANGE UP (ref 0–2)
BILIRUB SERPL-MCNC: 1 MG/DL — SIGNIFICANT CHANGE UP (ref 0.2–1.2)
BUN SERPL-MCNC: 24 MG/DL — HIGH (ref 7–23)
CALCIUM SERPL-MCNC: 8.8 MG/DL — SIGNIFICANT CHANGE UP (ref 8.5–10.1)
CHLORIDE SERPL-SCNC: 100 MMOL/L — SIGNIFICANT CHANGE UP (ref 96–108)
CO2 SERPL-SCNC: 32 MMOL/L — HIGH (ref 22–31)
CREAT SERPL-MCNC: 0.65 MG/DL — SIGNIFICANT CHANGE UP (ref 0.5–1.3)
EGFR: 93 ML/MIN/1.73M2 — SIGNIFICANT CHANGE UP
EOSINOPHIL # BLD AUTO: 0.07 K/UL — SIGNIFICANT CHANGE UP (ref 0–0.5)
EOSINOPHIL NFR BLD AUTO: 0.8 % — SIGNIFICANT CHANGE UP (ref 0–6)
GLUCOSE SERPL-MCNC: 96 MG/DL — SIGNIFICANT CHANGE UP (ref 70–99)
HCT VFR BLD CALC: 25.3 % — LOW (ref 34.5–45)
HGB BLD-MCNC: 8.2 G/DL — LOW (ref 11.5–15.5)
IMM GRANULOCYTES NFR BLD AUTO: 1.4 % — HIGH (ref 0–0.9)
INR BLD: 1.2 RATIO — HIGH (ref 0.85–1.18)
LYMPHOCYTES # BLD AUTO: 1.12 K/UL — SIGNIFICANT CHANGE UP (ref 1–3.3)
LYMPHOCYTES # BLD AUTO: 12.8 % — LOW (ref 13–44)
MACROCYTES BLD QL: SLIGHT — SIGNIFICANT CHANGE UP
MAGNESIUM SERPL-MCNC: 1.7 MG/DL — SIGNIFICANT CHANGE UP (ref 1.6–2.6)
MANUAL SMEAR VERIFICATION: SIGNIFICANT CHANGE UP
MCHC RBC-ENTMCNC: 32.4 GM/DL — SIGNIFICANT CHANGE UP (ref 32–36)
MCHC RBC-ENTMCNC: 33.5 PG — SIGNIFICANT CHANGE UP (ref 27–34)
MCV RBC AUTO: 103.3 FL — HIGH (ref 80–100)
MICROCYTES BLD QL: SLIGHT — SIGNIFICANT CHANGE UP
MONOCYTES # BLD AUTO: 0.26 K/UL — SIGNIFICANT CHANGE UP (ref 0–0.9)
MONOCYTES NFR BLD AUTO: 3 % — SIGNIFICANT CHANGE UP (ref 2–14)
NEUTROPHILS # BLD AUTO: 7.09 K/UL — SIGNIFICANT CHANGE UP (ref 1.8–7.4)
NEUTROPHILS NFR BLD AUTO: 80.9 % — HIGH (ref 43–77)
PLAT MORPH BLD: NORMAL — SIGNIFICANT CHANGE UP
PLATELET # BLD AUTO: 397 K/UL — SIGNIFICANT CHANGE UP (ref 150–400)
POLYCHROMASIA BLD QL SMEAR: SLIGHT — SIGNIFICANT CHANGE UP
POTASSIUM SERPL-MCNC: 4.1 MMOL/L — SIGNIFICANT CHANGE UP (ref 3.5–5.3)
POTASSIUM SERPL-SCNC: 4.1 MMOL/L — SIGNIFICANT CHANGE UP (ref 3.5–5.3)
PROT SERPL-MCNC: 7.7 GM/DL — SIGNIFICANT CHANGE UP (ref 6–8.3)
PROTHROM AB SERPL-ACNC: 13.5 SEC — HIGH (ref 9.5–13)
RBC # BLD: 2.45 M/UL — LOW (ref 3.8–5.2)
RBC # FLD: 23.7 % — HIGH (ref 10.3–14.5)
RBC BLD AUTO: ABNORMAL
SODIUM SERPL-SCNC: 137 MMOL/L — SIGNIFICANT CHANGE UP (ref 135–145)
TROPONIN I, HIGH SENSITIVITY RESULT: 8.63 NG/L — SIGNIFICANT CHANGE UP
WBC # BLD: 8.76 K/UL — SIGNIFICANT CHANGE UP (ref 3.8–10.5)
WBC # FLD AUTO: 8.76 K/UL — SIGNIFICANT CHANGE UP (ref 3.8–10.5)

## 2023-08-08 PROCEDURE — 99284 EMERGENCY DEPT VISIT MOD MDM: CPT

## 2023-08-08 PROCEDURE — 83735 ASSAY OF MAGNESIUM: CPT

## 2023-08-08 PROCEDURE — 84484 ASSAY OF TROPONIN QUANT: CPT

## 2023-08-08 PROCEDURE — 93010 ELECTROCARDIOGRAM REPORT: CPT

## 2023-08-08 PROCEDURE — 85610 PROTHROMBIN TIME: CPT

## 2023-08-08 PROCEDURE — 99285 EMERGENCY DEPT VISIT HI MDM: CPT

## 2023-08-08 PROCEDURE — 80053 COMPREHEN METABOLIC PANEL: CPT

## 2023-08-08 PROCEDURE — 93005 ELECTROCARDIOGRAM TRACING: CPT

## 2023-08-08 PROCEDURE — 36415 COLL VENOUS BLD VENIPUNCTURE: CPT

## 2023-08-08 PROCEDURE — 85025 COMPLETE CBC W/AUTO DIFF WBC: CPT

## 2023-08-08 PROCEDURE — 85730 THROMBOPLASTIN TIME PARTIAL: CPT

## 2023-08-08 NOTE — ED ADULT TRIAGE NOTE - NS ED NURSE DIRECT TO ROOM YN
Yes 36 y/o F, with hx of anxiety and depression, presents to the ED c/o an ingrown hair to her mons pubis, onset 2 days ago.  Pt states that the site has progressively worsened and increased in size since onset.  Pt also notes redness and swelling at the site.  Pt states that today she began to notice discharge from the site.  Notes that she currently shaves the area of infection.  Denies similar sx in the past.  Notes recent diagnosis of UTI and GC, both of which have been treated.  Pt denies fevers/chills, ha, loc, focal neuro deficits, cp/sob/palp, cough, abd pain/n/v/d, urinary symptoms, recent travel and sick contacts.

## 2023-08-08 NOTE — ED PROVIDER NOTE - OBJECTIVE STATEMENT
74 y/o female with a PMHx of anemia, Afib on Xarelto, COPD, CHF, kidney stones, myelofibrosis, pericardial effusion, PNA presents to the ED c/o fatigue. Denies black/bloody stools, bleeding. Last blood transfusion last week. No other complaints at this time. 74 y/o female with a PMHx of anemia, Afib on Xarelto, COPD, CHF, myelofibrosis, presents to the ED c/o fatigue. Denies black/bloody stools, bleeding. Last blood transfusion last week after back surgery. states feeling fatigued and concerned she is anemic. no cp or cough. No other complaints at this time.

## 2023-08-08 NOTE — ED PROVIDER NOTE - CLINICAL SUMMARY MEDICAL DECISION MAKING FREE TEXT BOX
Likely symptomatic anemia with hx of myelofibrosis. Despite being on AC, no reported source of bleeding. No CP to suggest ACS. Pt afebrile. Do not suspect infection. Will check labs, possible transfusion.

## 2023-08-08 NOTE — ED ADULT NURSE REASSESSMENT NOTE - NS ED NURSE REASSESS COMMENT FT1
Received report from Rhonda GRIGSBY RN. pt resting comfortably in bed, respiration even and unlabored. pt states, "I feel good." Vital signs as noted. pending dispo. pt in NAD.

## 2023-08-08 NOTE — ED ADULT TRIAGE NOTE - CHIEF COMPLAINT QUOTE
Pt presents to ED c/o shortness of breath. P tstates "I had a procedure done last week for my fractured back. I fractured it last Sunday. I have a feeling I am anemic because when I am anemic this happens." Pt is on xeralto. Endorses lightheadedness/ weakness. Denies dark/bloody stools, hematuria.

## 2023-08-08 NOTE — ED PROVIDER NOTE - NSFOLLOWUPINSTRUCTIONS_ED_ALL_ED_FT
Anemia     Anemia is a condition in which there is not enough red blood cells or hemoglobin in the blood. Hemoglobin is a substance in red blood cells that carries oxygen.    When you do not have enough red blood cells or hemoglobin (are anemic), your body cannot get enough oxygen and your organs may not work properly. As a result, you may feel very tired or have other problems.    What are the causes?    Common causes of anemia include:  •Excessive bleeding. Anemia can be caused by excessive bleeding inside or outside the body, including bleeding from the intestines or from heavy menstrual periods in females.  •Poor nutrition.  •Long-lasting (chronic) kidney, thyroid, and liver disease.  •Bone marrow disorders, spleen problems, and blood disorders.  •Cancer and treatments for cancer.  •HIV (human immunodeficiency virus) and AIDS (acquired immunodeficiency syndrome).  •Infections, medicines, and autoimmune disorders that destroy red blood cells.    What are the signs or symptoms?    Symptoms of this condition include:  •Minor weakness.  •Dizziness.  •Headache, or difficulties concentrating and sleeping.  •Heartbeats that feel irregular or faster than normal (palpitations).  •Shortness of breath, especially with exercise.  •Pale skin, lips, and nails, or cold hands and feet.  •Indigestion and nausea.    Symptoms may occur suddenly or develop slowly. If your anemia is mild, you may not have symptoms.    How is this diagnosed?    This condition is diagnosed based on blood tests, your medical history, and a physical exam. In some cases, a test may be needed in which cells are removed from the soft tissue inside of a bone and looked at under a microscope (bone marrow biopsy). Your health care provider may also check your stool (feces) for blood and may do additional testing to look for the cause of your bleeding.    Other tests may include:  •Imaging tests, such as a CT scan or MRI.  •A procedure to see inside your esophagus and stomach (endoscopy).  •A procedure to see inside your colon and rectum (colonoscopy).    How is this treated?    Treatment for this condition depends on the cause. If you continue to lose a lot of blood, you may need to be treated at a hospital. Treatment may include:  •Taking supplements of iron, vitamin B12, or folic acid.  •Taking a hormone medicine (erythropoietin) that can help to stimulate red blood cell growth.  •Having a blood transfusion. This may be needed if you lose a lot of blood.  •Making changes to your diet.  •Having surgery to remove your spleen.    Follow these instructions at home:    •Take over-the-counter and prescription medicines only as told by your health care provider.  •Take supplements only as told by your health care provider.  •Follow any diet instructions that you were given by your health care provider.  •Keep all follow-up visits as told by your health care provider. This is important.    Contact a health care provider if:  •You develop new bleeding anywhere in the body.    Get help right away if:  •You are very weak.  •You are short of breath.  •You have pain in your abdomen or chest.  •You are dizzy or feel faint.  •You have trouble concentrating.  •You have bloody stools, black stools, or tarry stools.  •You vomit repeatedly or you vomit up blood.    These symptoms may represent a serious problem that is an emergency. Do not wait to see if the symptoms will go away. Get medical help right away. Call your local emergency services (911 in the [place] Do not drive yourself to the hospital.     Summary    •Anemia is a condition in which you do not have enough red blood cells or enough of a substance in your red blood cells that carries oxygen (hemoglobin).  •Symptoms may occur suddenly or develop slowly.  •If your anemia is mild, you may not have symptoms.  •This condition is diagnosed with blood tests, a medical history, and a physical exam. Other tests may be needed.  •Treatment for this condition depends on the cause of the anemia.    This information is not intended to replace advice given to you by your health care provider. Make sure you discuss any questions you have with your health care provider.

## 2023-08-08 NOTE — ED PROVIDER NOTE - PATIENT PORTAL LINK FT
You can access the FollowMyHealth Patient Portal offered by [facility] by registering at the following website: [email or web address]. By joining [employer]’s FollowMyHealth portal, you will also be able to view your health information using other applications (apps) compatible with our system.

## 2023-08-08 NOTE — ED ADULT NURSE NOTE - CCCP TRG CHIEF CMPLNT
If you are a smoker, it is important for your health to stop smoking. Please be aware that second hand smoke is also harmful.
shortness of breath

## 2023-08-08 NOTE — ED PROVIDER NOTE - PROGRESS NOTE DETAILS
Hemoglobin in the eights.  Patient feels well, comfortable not getting transfusion at this time.  Would like to be discharged.  Patient will follow-up with her hematologist.  Plan to discharge patient. Return to ED precautions were discussed with the patient/family. All questions were answered. Dmitriy Ly MD. Hemoglobin 8.2. Patient feels well, comfortable not getting transfusion at this time.  Would like to be discharged.  Patient will follow-up with her hematologist.  Plan to discharge patient. Return to ED precautions were discussed with the patient/family. All questions were answered. Dmitriy Ly MD.

## 2023-08-08 NOTE — ED ADULT NURSE NOTE - NSFALLHARMRISKINTERV_ED_ALL_ED
Assistance OOB with selected safe patient handling equipment if applicable/Assistance with ambulation/Communicate risk of Fall with Harm to all staff, patient, and family/Monitor gait and stability/Provide visual cue: red socks, yellow wristband, yellow gown, etc/Reinforce activity limits and safety measures with patient and family/Bed in lowest position, wheels locked, appropriate side rails in place/Call bell, personal items and telephone in reach/Instruct patient to call for assistance before getting out of bed/chair/stretcher/Non-slip footwear applied when patient is off stretcher/[place] to call system/Physically safe environment - no spills, clutter or unnecessary equipment/Purposeful Proactive Rounding/Room/bathroom lighting operational, light cord in reach

## 2023-08-08 NOTE — ED ADULT NURSE NOTE - OBJECTIVE STATEMENT
Pt presents to ED c/o shortness of breath, weakness, and dizziness since last night. PMH of anemia. Pt states "this is how I feel whenever I am anemic." Last blood transfusion on Friday while at [facility] for spinal surgery s/p mechanical fall on 7/29. Pt on Xarelto.

## 2023-12-07 NOTE — DISCHARGE NOTE NURSING/CASE MANAGEMENT/SOCIAL WORK - PATIENT PORTAL LINK FT
"Anesthesia Transfer of Care Note    Patient: Froilan Ray    Procedure(s) Performed: Procedure(s) (LRB):  REPAIR, HERNIA, EPIGASTRIC (N/A)    Patient location: PACU    Anesthesia Type: general    Transport from OR: Transported from OR on room air with adequate spontaneous ventilation    Post pain: adequate analgesia    Post assessment: no apparent anesthetic complications and tolerated procedure well    Post vital signs: stable    Level of consciousness: awake and alert    Nausea/Vomiting: no nausea/vomiting    Complications: none    Transfer of care protocol was followed      Last vitals: Visit Vitals  BP (!) 176/82 (BP Location: Right arm, Patient Position: Lying)   Pulse 72   Temp 37.1 °C (98.7 °F) (Oral)   Resp 19   Ht 5' 6" (1.676 m)   Wt 68.7 kg (151 lb 6.4 oz)   SpO2 96%   Breastfeeding No   BMI 24.44 kg/m²     "
You can access the FollowMyHealth Patient Portal offered by [facility] by registering at the following website: [email or web address]. By joining [employer]s FollowMyHealth portal, you will also be able to view your health information using other applications (apps) compatible with our system.

## 2024-08-12 NOTE — ED ADULT NURSE NOTE - NSFALLRISK_ED_ALL_ED
Patient is new to myself.  Patient is overdue for blood work.  Please see if patient has enough refills to last until he establish care with me this month.  Thank you No

## 2024-11-13 NOTE — PATIENT PROFILE ADULT - PATIENT'S SEXUAL ORIENTATION
Specialty Condition Management Team CHF;  Enrolled; Week #2; Call #2; left VM/cell requesting c/b   Heterosexual

## 2024-11-25 NOTE — ED PROVIDER NOTE - RESPIRATORY, MLM
Caller: TERESSA LATHAM    Relationship:     Best call back number:     What is the best time to reach you: ANYTIME, CAN LVM    Who are you requesting to speak with (clinical staff, provider,  specific staff member): DR. GUADALUPE    Do you know the name of the person who called: PATIENT    What was the call regarding: NEEDS TO RE-ORDER CATHETER SUPPLIES AND CHANGE STYLE OF CATHETER BECAUSE HE CAN'T USE THE ONES HE HAS RIGHT NOW. NEEDS TO KNOW COST BEFORE ORDERING AS WELL. STRAIGHT SPEEDY CATH IS WHAT HE IS NEEDING. HE DOES NOT WANT CURVE TIP BECAUSE HE CANNOT USE THOSE. PLEASE CALL BACK TO ADVISE. AND PLEASE SEND ORDER ASAP THANK YOU.       
Called pt- placed new order for 16fr straight cath in Madiha's sign folder  
Breath sounds clear and equal bilaterally.

## 2024-11-29 ENCOUNTER — OUTPATIENT (OUTPATIENT)
Dept: OUTPATIENT SERVICES | Facility: HOSPITAL | Age: 74
LOS: 1 days | End: 2024-11-29
Payer: MEDICARE

## 2024-11-29 VITALS
HEIGHT: 60 IN | SYSTOLIC BLOOD PRESSURE: 148 MMHG | OXYGEN SATURATION: 97 % | HEART RATE: 80 BPM | DIASTOLIC BLOOD PRESSURE: 82 MMHG | WEIGHT: 119.93 LBS | RESPIRATION RATE: 18 BRPM | TEMPERATURE: 98 F

## 2024-11-29 DIAGNOSIS — I48.20 CHRONIC ATRIAL FIBRILLATION, UNSPECIFIED: ICD-10-CM

## 2024-11-29 DIAGNOSIS — R74.8 ABNORMAL LEVELS OF OTHER SERUM ENZYMES: ICD-10-CM

## 2024-11-29 DIAGNOSIS — Z98.890 OTHER SPECIFIED POSTPROCEDURAL STATES: Chronic | ICD-10-CM

## 2024-11-29 DIAGNOSIS — R16.0 HEPATOMEGALY, NOT ELSEWHERE CLASSIFIED: ICD-10-CM

## 2024-11-29 DIAGNOSIS — K76.6 PORTAL HYPERTENSION: ICD-10-CM

## 2024-11-29 DIAGNOSIS — Z01.818 ENCOUNTER FOR OTHER PREPROCEDURAL EXAMINATION: ICD-10-CM

## 2024-11-29 DIAGNOSIS — R16.1 SPLENOMEGALY, NOT ELSEWHERE CLASSIFIED: ICD-10-CM

## 2024-11-29 PROCEDURE — G0463: CPT

## 2024-11-29 NOTE — H&P PST ADULT - PROBLEM SELECTOR PLAN 1
Patient refused to have her blood taken in PST  Will try to obtain records from GI office  Instructions provided and reviewed  diet provided and reviewed  all questions answered during encounter Patient refused to have her blood taken in PST - will request from Dr Woodall  Instructions provided and reviewed  diet provided and reviewed  all questions answered during encounter

## 2024-11-29 NOTE — H&P PST ADULT - NSICDXPASTMEDICALHX_GEN_ALL_CORE_FT
PAST MEDICAL HISTORY:  Anemia     Atrial fibrillation     COPD, mild     H/O CHF     Kidney stone     Myelofibrosis     On home oxygen therapy     Pericardial effusion     PNA (pneumonia)      PAST MEDICAL HISTORY:  Anemia     Atrial fibrillation     COPD, mild     Former smoker     H/O CHF     HLD (hyperlipidemia)     HTN (hypertension)     Kidney stone     Myelofibrosis     Pericardial effusion     PNA (pneumonia)

## 2024-11-29 NOTE — H&P PST ADULT - NEGATIVE NEUROLOGICAL SYMPTOMS
no weakness/no paresthesias/no generalized seizures/no focal seizures/no syncope/no vertigo/no difficulty walking/no headache/no loss of consciousness/no hemiparesis/no confusion

## 2024-11-29 NOTE — H&P PST ADULT - MUSCULOSKELETAL
details… ROM intact/no calf tenderness/normal gait/strength 5/5 bilateral upper extremities/strength 5/5 bilateral lower extremities/no chest wall tenderness

## 2024-11-29 NOTE — H&P PST ADULT - OTHER CARE PROVIDERS
Brennen Woodall [phone] (Gastroenterologist) // [facility] [phone] last seen October (goes monthly) //  Khris Osorio (cardiologist) [phone]  (11/19 for evaluation) //

## 2024-11-29 NOTE — H&P PST ADULT - PROBLEM SELECTOR PLAN 2
Patient to obtain plan for Xarelto from cardiologist   Patient will continue on aspirin   Patient will continue HTN medication as prescribed

## 2024-11-29 NOTE — H&P PST ADULT - ASSESSMENT
Airway:  normal    Mallampati-   2    Dental: Patient denies loose teeth- permanent implants   7.99 , cleaning house, shopping and walking at the mall

## 2024-11-29 NOTE — H&P PST ADULT - HISTORY OF PRESENT ILLNESS
74 year old female presents to PST prior to scheduled EGD on 12/5/24 with Dr Chris. Patient and patient's son endorse "enlarged spleen" since 2020 related to myelofibrosis diagnosis. Endorses continued splenomegaly over the past few years. It was thought that her medication (Revlimid) reduce the size, however no improvement. Patient states she will have an "uncomfortable" feeling and it  is exacerbated by eating dairy. PMHx includes anemia, afib (Xarelto) COPD (mild - 2023), CHF (2023 exacerbation -since hospitalization she is feeling "well"), Kidney stones, myelofibrosis (dx 2020 - previous transfusions last transfusion approximately one year ago), pericardial effusion (March 2023 -admission to [facility]) and PNA (2023 - was on oxygen but no longer). Today patient is feeling "good" denies chest pain, sob, ha, n/v/d, abdominal pain, f/c, urinary symptoms, hematuria.     *patient refused blood work during PST visit  74 year old female presents to PST prior to scheduled EGD on 12/5/24 with Dr Chris. Patient and patient's son endorse "enlarged spleen" since 2020 related to myelofibrosis diagnosis. Endorses continued splenomegaly over the past few years. It was thought that her medication (Revlimid) reduce the size, however no improvement. Patient states she will have an "uncomfortable" feeling and it  is exacerbated by eating dairy. PMHx includes anemia, afib (Xarelto) COPD (mild - last exacerbation 2023), CHF (2023 exacerbation -since hospitalization she is feeling "well"), HLD, Kidney stones, myelofibrosis (dx 2020 - previous transfusions last transfusion approximately one year ago follows with [facility] hematologist monthly), pericardial effusion (March 2023 -admission to [facility]) former smoker and PNA (2023 - was on oxygen but no longer). Today patient is feeling "good" denies chest pain, sob, ha, n/v/d, abdominal pain, f/c, urinary symptoms, hematuria.     *patient refused blood work during PST visit states she had blood work done already - will request labs from Dr Woodall's office

## 2024-11-30 PROBLEM — Z99.81 DEPENDENCE ON SUPPLEMENTAL OXYGEN: Chronic | Status: INACTIVE | Noted: 2023-04-08 | Resolved: 2024-11-29

## 2024-12-05 ENCOUNTER — OUTPATIENT (OUTPATIENT)
Dept: OUTPATIENT SERVICES | Facility: HOSPITAL | Age: 74
LOS: 1 days | End: 2024-12-05
Payer: MEDICARE

## 2024-12-05 ENCOUNTER — TRANSCRIPTION ENCOUNTER (OUTPATIENT)
Age: 74
End: 2024-12-05

## 2024-12-05 VITALS
RESPIRATION RATE: 18 BRPM | TEMPERATURE: 98 F | OXYGEN SATURATION: 95 % | HEART RATE: 66 BPM | HEIGHT: 60 IN | WEIGHT: 119.93 LBS | DIASTOLIC BLOOD PRESSURE: 67 MMHG | SYSTOLIC BLOOD PRESSURE: 151 MMHG

## 2024-12-05 VITALS
OXYGEN SATURATION: 97 % | DIASTOLIC BLOOD PRESSURE: 59 MMHG | SYSTOLIC BLOOD PRESSURE: 111 MMHG | HEART RATE: 66 BPM | RESPIRATION RATE: 19 BRPM

## 2024-12-05 DIAGNOSIS — Z98.890 OTHER SPECIFIED POSTPROCEDURAL STATES: Chronic | ICD-10-CM

## 2024-12-05 DIAGNOSIS — R74.8 ABNORMAL LEVELS OF OTHER SERUM ENZYMES: ICD-10-CM

## 2024-12-05 DIAGNOSIS — R16.1 SPLENOMEGALY, NOT ELSEWHERE CLASSIFIED: ICD-10-CM

## 2024-12-05 DIAGNOSIS — K76.6 PORTAL HYPERTENSION: ICD-10-CM

## 2024-12-05 DIAGNOSIS — R16.0 HEPATOMEGALY, NOT ELSEWHERE CLASSIFIED: ICD-10-CM

## 2024-12-05 PROCEDURE — 43239 EGD BIOPSY SINGLE/MULTIPLE: CPT

## 2024-12-05 PROCEDURE — 88305 TISSUE EXAM BY PATHOLOGIST: CPT

## 2024-12-05 PROCEDURE — 88305 TISSUE EXAM BY PATHOLOGIST: CPT | Mod: 26

## 2024-12-05 RX ORDER — LENALIDOMIDE 25 MG/1
1 CAPSULE ORAL
Refills: 0 | DISCHARGE

## 2024-12-05 RX ORDER — FEBUXOSTAT 40 MG/1
1 TABLET, FILM COATED ORAL
Refills: 0 | DISCHARGE

## 2024-12-05 RX ORDER — NEBIVOLOL HYDROCHLORIDE 10 MG/1
1 TABLET ORAL
Refills: 0 | DISCHARGE

## 2024-12-05 RX ORDER — RIVAROXABAN 10 MG/1
1 TABLET, FILM COATED ORAL
Refills: 0 | DISCHARGE

## 2024-12-05 RX ORDER — SODIUM CHLORIDE 9 MG/ML
500 INJECTION, SOLUTION INTRAMUSCULAR; INTRAVENOUS; SUBCUTANEOUS
Refills: 0 | Status: COMPLETED | OUTPATIENT
Start: 2024-12-05 | End: 2024-12-05

## 2024-12-05 RX ADMIN — SODIUM CHLORIDE 30 MILLILITER(S): 9 INJECTION, SOLUTION INTRAMUSCULAR; INTRAVENOUS; SUBCUTANEOUS at 07:54

## 2024-12-05 NOTE — ASU PREOP CHECKLIST - AICD PRESENT
Final Anesthesia Post-op Assessment    Patient: Kerri Guajardo  Procedure(s) Performed: LEFT TOTAL KNEE ARTHROPLASTY  Anesthesia type: General    Vitals Value Taken Time   Temp 36.3 °C (97.3 °F) 02/20/23 0915   Pulse 91 02/20/23 0915   Resp 21 02/20/23 0915   SpO2 98 % 02/20/23 0915   /72 02/20/23 0915         Patient Location: PACU Phase 1  Post-op Vital Signs:stable  Level of Consciousness: awake and alert  Respiratory Status: spontaneous ventilation  Cardiovascular stable  Hydration: euvolemic  Pain Management: adequately controlled  Handoff: Handoff to receiving clinician was performed and questions were answered  Vomiting: none  Nausea: None  Airway Patency:patent  Post-op Assessment: awake, alert, appropriately conversant, or baseline, no complications, patient tolerated procedure well with no complications, no evidence of recall, dentition within defined limits, moving all extremities and No Corneal Abrasion      No notable events documented.   
no

## 2024-12-05 NOTE — PRE PROCEDURE NOTE - PRE PROCEDURE EVALUATION
Attending Physician:    Brennen Woodall DO                        Procedure: EGD    Indication for Procedure: Ruleout EVs  ________________________________________________________  PAST MEDICAL & SURGICAL HISTORY:  Myelofibrosis      PNA (pneumonia)      Anemia      Atrial fibrillation      Pericardial effusion      Kidney stone      COPD, mild      H/O CHF      Former smoker      HLD (hyperlipidemia)      HTN (hypertension)      H/O colonoscopy        ALLERGIES:  No Known Allergies    HOME MEDICATIONS:  Aspir 81 oral delayed release tablet: 1 tab(s) orally once a day  atorvastatin 10 mg oral tablet: 1 tab(s) orally once a day (at bedtime)  digoxin 125 mcg (0.125 mg) oral tablet: 1 tab(s) orally once a day  febuxostat 40 mg oral tablet: 1 tab(s) orally once a day  Lenalidomide 5 mg oral capsule: 1 cap(s) orally once a day  nebivolol 10 mg oral tablet: 1 tab(s) orally once a day  omeprazole 20 mg oral delayed release capsule: 1 tab(s) orally once a day  rivaroxaban 15 mg oral tablet: 1 tab(s) orally once a day (at bedtime)  Vitamin D3 125 mcg (5000 intl units) oral capsule: 1 cap(s) orally once a day    AICD/PPM: [ ] yes   [ ] no    PERTINENT LAB DATA:                      PHYSICAL EXAMINATION:    Height (cm): 152.4  Weight (kg): 54.4  BMI (kg/m2): 23.4  BSA (m2): 1.5T(C): 36.7  HR: 66  BP: 151/67  RR: 18  SpO2: 95%    Constitutional: NAD  HEENT: PERRLA, EOMI,    Neck:  No JVD  Respiratory: CTAB/L  Cardiovascular: S1 and S2  Gastrointestinal: BS+, soft, NT/ND  Extremities: No peripheral edema  Neurological: A/O x 3, no focal deficits  Psychiatric: Normal mood, normal affect  Skin: No rashes    ASA Class: I [ ]  II [ ]  III [X]  IV [ ]    COMMENTS:    The patient is a suitable candidate for the planned procedure unless box checked [ ]  No, explain:

## 2024-12-05 NOTE — ASU DISCHARGE PLAN (ADULT/PEDIATRIC) - FINANCIAL ASSISTANCE
[facility] provides services at a reduced cost to those who are determined to be eligible through [facility]’s financial assistance program. Information regarding [facility]’s financial assistance program can be found by going to [email or web address] or by calling [phone].

## 2024-12-05 NOTE — ASU PATIENT PROFILE, ADULT - NSICDXPASTMEDICALHX_GEN_ALL_CORE_FT
PAST MEDICAL HISTORY:  Anemia     Atrial fibrillation     COPD, mild     Former smoker     H/O CHF     HLD (hyperlipidemia)     HTN (hypertension)     Kidney stone     Myelofibrosis     Pericardial effusion     PNA (pneumonia)

## 2024-12-05 NOTE — ASU PATIENT PROFILE, ADULT - FALL HARM RISK - HARM RISK INTERVENTIONS

## 2024-12-05 NOTE — PRE-ANESTHESIA EVALUATION ADULT - NSANTHRISKNONERD_GEN_ALL_CORE
Occupational Therapy     Referred by: Maribel Dawn MD; Medical Diagnosis (from order):    Diagnosis Information      Diagnosis    333.2 (ICD-9-CM) - G25.3 (ICD-10-CM) - Myoclonus                  Daily Treatment Note    Visit: 22    SUBJECTIVE                                                                                                                 Pt states his caregivers are giving him more time to complete transfers with less assistance  Pain / Symptoms:  Pain rating (out of 10): Current: 0     Function (patient/family/caregiver reported):   Functional Change: Improved independence with dressing      OBJECTIVE                                                                                                                          TREATMENT                                                                                                                  Neuromuscular Re-Education:  Movement sequence for transfers: cues for three steps - scoot forward, feet away from target, lean forward. Cues needed as reminders.   W/c <> mat  W/c <> arm chair  Arm chair <> elevated mat table to simulate bed height        Activities of Daily Living/Self Care:  Doff jacket sitting in wheelchair with min assist, increased time     Skilled input: verbal instruction/cues    Writer verbally educated and received verbal consent for hand placement, positioning of patient, and techniques to be performed today from patient for therapist position for techniques and hand placement and palpation for techniques as described above and how they are pertinent to the patient's plan of care.    Home Exercise Program/Education Materials: Work with aides on setup for squat pivot transfers at home.   6/11/21  Supine Shoulder Flexion Extension AAROM with Dowel - 1 x daily - 7 x weekly - 10 reps - 3 sets  Seated Shoulder Flexion Towel Slide at Table Top - 1 x daily - 7 x weekly - 10 reps - 3 sets         ASSESSMENT                                                                                                              Increased difficulty with transferring from wheelchair to mat vs. Mat to wheelchair.  Pt appears to do better with remaining in squat position when he has an armrest to reach for.  Cues required to recall which direction to point feet during transfers. Pt fatigued by end of session and requires increased assistance to transfer.  Toughest transfer is patient's bed at home, which was simulated by elevated mat table this date.  Difficulty with scooting hips onto mat table when at elevated height. Pt would benefit from further practice transferring to elevated surfaces.  Patient/Caregiver Education:   Results of above outlined education: Verbalizes understanding, Needs reinforcement and Demonstrates understanding    PLAN                                                                                                                           Suggestions for next session as indicated: Progress per plan of care  squat pivot transfer setup with verbalizations, standing balance with one point of stabilization.  Standing bimanual activities  Pants management  Toilet transfers  Transfer without AD or grab bar  PN next session for auth extension             Therapy procedure time and total treatment time can be found documented on the Time Entry flowsheet.   No risk alerts present

## 2024-12-09 LAB — SURGICAL PATHOLOGY STUDY: SIGNIFICANT CHANGE UP

## 2025-01-11 ENCOUNTER — INPATIENT (INPATIENT)
Facility: HOSPITAL | Age: 75
LOS: 1 days | Discharge: HOME | DRG: 177 | End: 2025-01-13
Attending: STUDENT IN AN ORGANIZED HEALTH CARE EDUCATION/TRAINING PROGRAM | Admitting: STUDENT IN AN ORGANIZED HEALTH CARE EDUCATION/TRAINING PROGRAM
Payer: MEDICARE

## 2025-01-11 ENCOUNTER — NON-APPOINTMENT (OUTPATIENT)
Age: 75
End: 2025-01-11

## 2025-01-11 VITALS — HEIGHT: 60 IN

## 2025-01-11 DIAGNOSIS — Z98.890 OTHER SPECIFIED POSTPROCEDURAL STATES: Chronic | ICD-10-CM

## 2025-01-11 LAB
ALBUMIN SERPL ELPH-MCNC: 3.8 G/DL — SIGNIFICANT CHANGE UP (ref 3.3–5)
ALP SERPL-CCNC: 303 U/L — HIGH (ref 40–120)
ALT FLD-CCNC: 231 U/L — HIGH (ref 12–78)
ANION GAP SERPL CALC-SCNC: 7 MMOL/L — SIGNIFICANT CHANGE UP (ref 5–17)
ANISOCYTOSIS BLD QL: SIGNIFICANT CHANGE UP
APTT BLD: 54.1 SEC — HIGH (ref 24.5–35.6)
AST SERPL-CCNC: 187 U/L — HIGH (ref 15–37)
BASOPHILS # BLD AUTO: 0 K/UL — SIGNIFICANT CHANGE UP (ref 0–0.2)
BASOPHILS NFR BLD AUTO: 0 % — SIGNIFICANT CHANGE UP (ref 0–2)
BILIRUB SERPL-MCNC: 1.8 MG/DL — HIGH (ref 0.2–1.2)
BUN SERPL-MCNC: 18 MG/DL — SIGNIFICANT CHANGE UP (ref 7–23)
CALCIUM SERPL-MCNC: 8.9 MG/DL — SIGNIFICANT CHANGE UP (ref 8.5–10.1)
CHLORIDE SERPL-SCNC: 98 MMOL/L — SIGNIFICANT CHANGE UP (ref 96–108)
CO2 SERPL-SCNC: 27 MMOL/L — SIGNIFICANT CHANGE UP (ref 22–31)
CREAT SERPL-MCNC: 0.7 MG/DL — SIGNIFICANT CHANGE UP (ref 0.5–1.3)
DIGOXIN SERPL-MCNC: 0.75 NG/ML — LOW (ref 0.8–2)
EGFR: 91 ML/MIN/1.73M2 — SIGNIFICANT CHANGE UP
EOSINOPHIL # BLD AUTO: 0.11 K/UL — SIGNIFICANT CHANGE UP (ref 0–0.5)
EOSINOPHIL NFR BLD AUTO: 1 % — SIGNIFICANT CHANGE UP (ref 0–6)
FLUAV AG NPH QL: SIGNIFICANT CHANGE UP
FLUBV AG NPH QL: SIGNIFICANT CHANGE UP
GLUCOSE SERPL-MCNC: 139 MG/DL — HIGH (ref 70–99)
HCT VFR BLD CALC: 42.4 % — SIGNIFICANT CHANGE UP (ref 34.5–45)
HGB BLD-MCNC: 13.3 G/DL — SIGNIFICANT CHANGE UP (ref 11.5–15.5)
INR BLD: 2.19 RATIO — HIGH (ref 0.85–1.16)
LACTATE SERPL-SCNC: 1.6 MMOL/L — SIGNIFICANT CHANGE UP (ref 0.7–2)
LYMPHOCYTES # BLD AUTO: 1.49 K/UL — SIGNIFICANT CHANGE UP (ref 1–3.3)
LYMPHOCYTES # BLD AUTO: 13 % — SIGNIFICANT CHANGE UP (ref 13–44)
MACROCYTES BLD QL: SLIGHT — SIGNIFICANT CHANGE UP
MAGNESIUM SERPL-MCNC: 1.9 MG/DL — SIGNIFICANT CHANGE UP (ref 1.6–2.6)
MANUAL SMEAR VERIFICATION: SIGNIFICANT CHANGE UP
MCHC RBC-ENTMCNC: 30.3 PG — SIGNIFICANT CHANGE UP (ref 27–34)
MCHC RBC-ENTMCNC: 31.4 G/DL — LOW (ref 32–36)
MCV RBC AUTO: 96.6 FL — SIGNIFICANT CHANGE UP (ref 80–100)
MICROCYTES BLD QL: SLIGHT — SIGNIFICANT CHANGE UP
MONOCYTES # BLD AUTO: 0.57 K/UL — SIGNIFICANT CHANGE UP (ref 0–0.9)
MONOCYTES NFR BLD AUTO: 5 % — SIGNIFICANT CHANGE UP (ref 2–14)
NEUTROPHILS # BLD AUTO: 9.27 K/UL — HIGH (ref 1.8–7.4)
NEUTROPHILS NFR BLD AUTO: 81 % — HIGH (ref 43–77)
NRBC # BLD: 0 /100 WBCS — SIGNIFICANT CHANGE UP (ref 0–0)
NRBC # BLD: SIGNIFICANT CHANGE UP /100 WBCS (ref 0–0)
NT-PROBNP SERPL-SCNC: 1023 PG/ML — HIGH (ref 0–125)
PLAT MORPH BLD: NORMAL — SIGNIFICANT CHANGE UP
PLATELET # BLD AUTO: 450 K/UL — HIGH (ref 150–400)
POTASSIUM SERPL-MCNC: 3.8 MMOL/L — SIGNIFICANT CHANGE UP (ref 3.5–5.3)
POTASSIUM SERPL-SCNC: 3.8 MMOL/L — SIGNIFICANT CHANGE UP (ref 3.5–5.3)
PROT SERPL-MCNC: 8 GM/DL — SIGNIFICANT CHANGE UP (ref 6–8.3)
PROTHROM AB SERPL-ACNC: 25 SEC — HIGH (ref 9.9–13.4)
RBC # BLD: 4.39 M/UL — SIGNIFICANT CHANGE UP (ref 3.8–5.2)
RBC # FLD: 20.7 % — HIGH (ref 10.3–14.5)
RBC BLD AUTO: ABNORMAL
RSV RNA NPH QL NAA+NON-PROBE: SIGNIFICANT CHANGE UP
SARS-COV-2 RNA SPEC QL NAA+PROBE: SIGNIFICANT CHANGE UP
SODIUM SERPL-SCNC: 132 MMOL/L — LOW (ref 135–145)
TROPONIN I, HIGH SENSITIVITY RESULT: 6.51 NG/L — SIGNIFICANT CHANGE UP
WBC # BLD: 11.44 K/UL — HIGH (ref 3.8–10.5)
WBC # FLD AUTO: 11.44 K/UL — HIGH (ref 3.8–10.5)

## 2025-01-11 PROCEDURE — 99285 EMERGENCY DEPT VISIT HI MDM: CPT

## 2025-01-11 PROCEDURE — 71250 CT THORAX DX C-: CPT | Mod: 26

## 2025-01-11 PROCEDURE — 93010 ELECTROCARDIOGRAM REPORT: CPT

## 2025-01-11 RX ORDER — IPRATROPIUM BROMIDE AND ALBUTEROL SULFATE .5; 2.5 MG/3ML; MG/3ML
3 SOLUTION RESPIRATORY (INHALATION) ONCE
Refills: 0 | Status: COMPLETED | OUTPATIENT
Start: 2025-01-11 | End: 2025-01-11

## 2025-01-11 RX ORDER — METHYLPREDNISOLONE 4 MG/1
125 TABLET ORAL ONCE
Refills: 0 | Status: COMPLETED | OUTPATIENT
Start: 2025-01-11 | End: 2025-01-11

## 2025-01-11 RX ORDER — ALBUTEROL SULFATE 90 UG/1
2 INHALANT RESPIRATORY (INHALATION) ONCE
Refills: 0 | Status: COMPLETED | OUTPATIENT
Start: 2025-01-11 | End: 2025-01-11

## 2025-01-11 RX ORDER — ACETAMINOPHEN 80 MG/.8ML
650 SOLUTION/ DROPS ORAL ONCE
Refills: 0 | Status: COMPLETED | OUTPATIENT
Start: 2025-01-11 | End: 2025-01-11

## 2025-01-11 RX ORDER — GUAIFENESIN 100 MG/5ML
1200 SYRUP ORAL ONCE
Refills: 0 | Status: COMPLETED | OUTPATIENT
Start: 2025-01-11 | End: 2025-01-11

## 2025-01-11 RX ADMIN — ACETAMINOPHEN 650 MILLIGRAM(S): 80 SOLUTION/ DROPS ORAL at 22:47

## 2025-01-11 RX ADMIN — Medication 1200 MILLIGRAM(S): at 23:58

## 2025-01-11 RX ADMIN — ALBUTEROL SULFATE 2 PUFF(S): 90 INHALANT RESPIRATORY (INHALATION) at 23:58

## 2025-01-11 RX ADMIN — ACETAMINOPHEN 650 MILLIGRAM(S): 80 SOLUTION/ DROPS ORAL at 23:40

## 2025-01-11 RX ADMIN — IPRATROPIUM BROMIDE AND ALBUTEROL SULFATE 3 MILLILITER(S): .5; 2.5 SOLUTION RESPIRATORY (INHALATION) at 21:42

## 2025-01-11 RX ADMIN — METHYLPREDNISOLONE 125 MILLIGRAM(S): 4 TABLET ORAL at 22:47

## 2025-01-11 NOTE — ED ADULT TRIAGE NOTE - CHIEF COMPLAINT QUOTE
Pt presents to ED c/o cold symptoms. Pt reports productive cough and congestion. Was seen at  and was told to come to ED for hypoxia, pt 89% o2 sat on room air. Pt well appearing in triage. Pt sent for EKG.

## 2025-01-11 NOTE — ED PROVIDER NOTE - OBJECTIVE STATEMENT
Pt. is a 73 yo F with hx of myelofibrosis, COPD, HTN, hyperlipidemia, CHF, Afib presenting with productive cough. Pt. is a 73 yo F with hx of myelofibrosis, COPD, HTN, hyperlipidemia, CHF, Afib presenting with productive cough of clear sputum X 4 days.  Patient's  had a cold at home right before she got sick.  She went to [facility] urgent care in [place] today and had a chest xray and then was sent to the ED for workup.  In triage O2 sat on room air was 89%.  Patient denies any chest pain, leg pain, leg swelling.  She took tylenol 1 tab at 11AM and 1 tab at 3pm.  She is eating and drinking normally without any vomiting or diarrhea. PMD: [place] Pulm: [place]

## 2025-01-11 NOTE — ED ADULT NURSE NOTE - NSFALLUNIVINTERV_ED_ALL_ED
Bed/Stretcher in lowest position, wheels locked, appropriate side rails in place/Call bell, personal items and telephone in reach/Instruct patient to call for assistance before getting out of bed/chair/stretcher/Non-slip footwear applied when patient is off stretcher/[place] to call system/Physically safe environment - no spills, clutter or unnecessary equipment/Purposeful proactive rounding/Room/bathroom lighting operational, light cord in reach

## 2025-01-11 NOTE — ED ADULT TRIAGE NOTE - HEIGHT IN INCHES
28 0

## 2025-01-11 NOTE — ED PROVIDER NOTE - CLINICAL SUMMARY MEDICAL DECISION MAKING FREE TEXT BOX
73 yo F with COPD, HTN, Afib, CHF with cough, fevers, sick contact at home.  RVP, labs, duoneb, solu-medrol, and will re-evaluate.     CXR at urgent care performed today now in PACS without any focal infiltrates or consolidation.

## 2025-01-11 NOTE — ED PROVIDER NOTE - PROGRESS NOTE DETAILS
JG: LFTs abnormal and elevated.  RUQ US ordered but patient refusing stating she knows this already, had workup for hepatitis.  Had outpatient US and outpatient MRI of abdomen with contrast last week.  Showed me report.  Has noncirrhotic steatosis.  No lesions, no obstructions.  Denies any abdominal pain.  Has respiratory symptoms only . Feeling better after the Duoneb and steroids.  Wants to go home.  Outpatient CXR does not show pneumonia.  Patient negative for flu and Covid and RSV.  Likely a viral bronchitis.  Due to oxygen lower than normal for her COPD, wanted to get CTA chest to r/o PE, but patient and  refusing. States she gets IV contrast too often, had it twice this month. Is taking anticoagulation already, does not wish to get IV contrast.  Will see her pulmonologist.  Will get noncontrast chest CT in ED. pro-BNP elevated but much lower than patients baseline.  CHF does not seem to the cause of the patients SOB.  Awaiting results of CT chest.

## 2025-01-11 NOTE — ED ADULT NURSE REASSESSMENT NOTE - NS ED NURSE REASSESS COMMENT FT1
02 sat down to 87-88% RA, placed on 2L 02 via NC, 02 sat up to 95%, resp. even and unlabored. Continues with congested cough

## 2025-01-11 NOTE — ED PROVIDER NOTE - CARE PLAN
Principal Discharge DX:	COPD exacerbation  Secondary Diagnosis:	Upper respiratory infection   1 Principal Discharge DX:	COPD exacerbation  Secondary Diagnosis:	Upper respiratory infection  Secondary Diagnosis:	HCAP (healthcare-associated pneumonia)  Secondary Diagnosis:	Pneumonia, aspiration

## 2025-01-11 NOTE — ED PROVIDER NOTE - SKIN, MLM
Skin normal color for race, warm, dry and intact. No evidence of rash. no cyanosis; good cap refill.

## 2025-01-11 NOTE — ED ADULT NURSE NOTE - OBJECTIVE STATEMENT
Pt is a 74 year old female presenting to ER with cold symptoms. Pt states she has had productive cough for clear sputum x 4 days, feeling tired,  denies fever, SOB, CP. Pt's  and other family member had similar symptoms but have improved. Pt with history of myelofibrosis, COPD, CHF, HTN,  and AFIB

## 2025-01-12 DIAGNOSIS — J44.1 CHRONIC OBSTRUCTIVE PULMONARY DISEASE WITH (ACUTE) EXACERBATION: ICD-10-CM

## 2025-01-12 PROBLEM — Z87.891 PERSONAL HISTORY OF NICOTINE DEPENDENCE: Chronic | Status: ACTIVE | Noted: 2024-11-29

## 2025-01-12 PROBLEM — I10 ESSENTIAL (PRIMARY) HYPERTENSION: Chronic | Status: ACTIVE | Noted: 2024-11-29

## 2025-01-12 PROBLEM — E78.5 HYPERLIPIDEMIA, UNSPECIFIED: Chronic | Status: ACTIVE | Noted: 2024-11-29

## 2025-01-12 LAB
ALBUMIN SERPL ELPH-MCNC: 3.5 G/DL — SIGNIFICANT CHANGE UP (ref 3.3–5)
ALP SERPL-CCNC: 238 U/L — HIGH (ref 40–120)
ALT FLD-CCNC: 163 U/L — HIGH (ref 12–78)
ANION GAP SERPL CALC-SCNC: 6 MMOL/L — SIGNIFICANT CHANGE UP (ref 5–17)
AST SERPL-CCNC: 82 U/L — HIGH (ref 15–37)
BASOPHILS # BLD AUTO: 0.09 K/UL — SIGNIFICANT CHANGE UP (ref 0–0.2)
BASOPHILS NFR BLD AUTO: 1 % — SIGNIFICANT CHANGE UP (ref 0–2)
BILIRUB SERPL-MCNC: 1.1 MG/DL — SIGNIFICANT CHANGE UP (ref 0.2–1.2)
BUN SERPL-MCNC: 24 MG/DL — HIGH (ref 7–23)
CALCIUM SERPL-MCNC: 8.8 MG/DL — SIGNIFICANT CHANGE UP (ref 8.5–10.1)
CHLORIDE SERPL-SCNC: 98 MMOL/L — SIGNIFICANT CHANGE UP (ref 96–108)
CO2 SERPL-SCNC: 28 MMOL/L — SIGNIFICANT CHANGE UP (ref 22–31)
CREAT SERPL-MCNC: 0.86 MG/DL — SIGNIFICANT CHANGE UP (ref 0.5–1.3)
EGFR: 71 ML/MIN/1.73M2 — SIGNIFICANT CHANGE UP
EOSINOPHIL # BLD AUTO: 0.01 K/UL — SIGNIFICANT CHANGE UP (ref 0–0.5)
EOSINOPHIL NFR BLD AUTO: 0.1 % — SIGNIFICANT CHANGE UP (ref 0–6)
GLUCOSE SERPL-MCNC: 221 MG/DL — HIGH (ref 70–99)
HCT VFR BLD CALC: 38.6 % — SIGNIFICANT CHANGE UP (ref 34.5–45)
HGB BLD-MCNC: 12.2 G/DL — SIGNIFICANT CHANGE UP (ref 11.5–15.5)
IMM GRANULOCYTES NFR BLD AUTO: 1.5 % — HIGH (ref 0–0.9)
LYMPHOCYTES # BLD AUTO: 0.61 K/UL — LOW (ref 1–3.3)
LYMPHOCYTES # BLD AUTO: 6.9 % — LOW (ref 13–44)
MCHC RBC-ENTMCNC: 30.8 PG — SIGNIFICANT CHANGE UP (ref 27–34)
MCHC RBC-ENTMCNC: 31.6 G/DL — LOW (ref 32–36)
MCV RBC AUTO: 97.5 FL — SIGNIFICANT CHANGE UP (ref 80–100)
MONOCYTES # BLD AUTO: 0.16 K/UL — SIGNIFICANT CHANGE UP (ref 0–0.9)
MONOCYTES NFR BLD AUTO: 1.8 % — LOW (ref 2–14)
NEUTROPHILS # BLD AUTO: 7.84 K/UL — HIGH (ref 1.8–7.4)
NEUTROPHILS NFR BLD AUTO: 88.7 % — HIGH (ref 43–77)
PLATELET # BLD AUTO: 378 K/UL — SIGNIFICANT CHANGE UP (ref 150–400)
POTASSIUM SERPL-MCNC: 3.5 MMOL/L — SIGNIFICANT CHANGE UP (ref 3.5–5.3)
POTASSIUM SERPL-SCNC: 3.5 MMOL/L — SIGNIFICANT CHANGE UP (ref 3.5–5.3)
PROT SERPL-MCNC: 7.4 GM/DL — SIGNIFICANT CHANGE UP (ref 6–8.3)
RBC # BLD: 3.96 M/UL — SIGNIFICANT CHANGE UP (ref 3.8–5.2)
RBC # FLD: 20.6 % — HIGH (ref 10.3–14.5)
SODIUM SERPL-SCNC: 132 MMOL/L — LOW (ref 135–145)
WBC # BLD: 8.84 K/UL — SIGNIFICANT CHANGE UP (ref 3.8–10.5)
WBC # FLD AUTO: 8.84 K/UL — SIGNIFICANT CHANGE UP (ref 3.8–10.5)

## 2025-01-12 PROCEDURE — 99223 1ST HOSP IP/OBS HIGH 75: CPT

## 2025-01-12 PROCEDURE — 76705 ECHO EXAM OF ABDOMEN: CPT

## 2025-01-12 PROCEDURE — 76705 ECHO EXAM OF ABDOMEN: CPT | Mod: 26

## 2025-01-12 PROCEDURE — 80053 COMPREHEN METABOLIC PANEL: CPT

## 2025-01-12 PROCEDURE — 85025 COMPLETE CBC W/AUTO DIFF WBC: CPT

## 2025-01-12 PROCEDURE — 85027 COMPLETE CBC AUTOMATED: CPT

## 2025-01-12 PROCEDURE — 36415 COLL VENOUS BLD VENIPUNCTURE: CPT

## 2025-01-12 PROCEDURE — 74181 MRI ABDOMEN W/O CONTRAST: CPT | Mod: MC

## 2025-01-12 PROCEDURE — 92610 EVALUATE SWALLOWING FUNCTION: CPT | Mod: GN

## 2025-01-12 RX ORDER — ONDANSETRON 4 MG/1
4 TABLET ORAL EVERY 8 HOURS
Refills: 0 | Status: DISCONTINUED | OUTPATIENT
Start: 2025-01-12 | End: 2025-01-13

## 2025-01-12 RX ORDER — METOPROLOL TARTRATE 50 MG
25 TABLET ORAL DAILY
Refills: 0 | Status: DISCONTINUED | OUTPATIENT
Start: 2025-01-12 | End: 2025-01-12

## 2025-01-12 RX ORDER — ASPIRIN 81 MG
81 TABLET, DELAYED RELEASE (ENTERIC COATED) ORAL DAILY
Refills: 0 | Status: DISCONTINUED | OUTPATIENT
Start: 2025-01-12 | End: 2025-01-13

## 2025-01-12 RX ORDER — CEFTRIAXONE SODIUM 1 G/1
1000 INJECTION, POWDER, FOR SOLUTION INTRAMUSCULAR; INTRAVENOUS EVERY 24 HOURS
Refills: 0 | Status: DISCONTINUED | OUTPATIENT
Start: 2025-01-12 | End: 2025-01-12

## 2025-01-12 RX ORDER — LENALIDOMIDE 2.5 MG/1
5 CAPSULE ORAL DAILY
Refills: 0 | Status: DISCONTINUED | OUTPATIENT
Start: 2025-01-12 | End: 2025-01-13

## 2025-01-12 RX ORDER — DOXYCYCLINE MONOHYDRATE 100 MG
100 TABLET ORAL EVERY 12 HOURS
Refills: 0 | Status: DISCONTINUED | OUTPATIENT
Start: 2025-01-12 | End: 2025-01-13

## 2025-01-12 RX ORDER — ALBUTEROL SULFATE 90 UG/1
2 INHALANT RESPIRATORY (INHALATION) EVERY 6 HOURS
Refills: 0 | Status: DISCONTINUED | OUTPATIENT
Start: 2025-01-12 | End: 2025-01-13

## 2025-01-12 RX ORDER — RIVAROXABAN 2.5 MG/1
15 TABLET, FILM COATED ORAL
Refills: 0 | Status: DISCONTINUED | OUTPATIENT
Start: 2025-01-12 | End: 2025-01-13

## 2025-01-12 RX ORDER — GUAIFENESIN 100 MG/5ML
200 SYRUP ORAL EVERY 6 HOURS
Refills: 0 | Status: DISCONTINUED | OUTPATIENT
Start: 2025-01-12 | End: 2025-01-13

## 2025-01-12 RX ORDER — PANTOPRAZOLE 40 MG/1
40 TABLET, DELAYED RELEASE ORAL
Refills: 0 | Status: DISCONTINUED | OUTPATIENT
Start: 2025-01-12 | End: 2025-01-13

## 2025-01-12 RX ORDER — DIGOXIN 250 MCG
125 TABLET ORAL DAILY
Refills: 0 | Status: DISCONTINUED | OUTPATIENT
Start: 2025-01-12 | End: 2025-01-13

## 2025-01-12 RX ORDER — PIPERACILLIN AND TAZOBACTAM 3; .375 G/15ML; G/15ML
3.38 INJECTION, POWDER, LYOPHILIZED, FOR SOLUTION INTRAVENOUS ONCE
Refills: 0 | Status: COMPLETED | OUTPATIENT
Start: 2025-01-12 | End: 2025-01-12

## 2025-01-12 RX ORDER — ACETAMINOPHEN 80 MG/.8ML
650 SOLUTION/ DROPS ORAL EVERY 6 HOURS
Refills: 0 | Status: DISCONTINUED | OUTPATIENT
Start: 2025-01-12 | End: 2025-01-13

## 2025-01-12 RX ORDER — ATORVASTATIN CALCIUM 40 MG/1
10 TABLET, FILM COATED ORAL AT BEDTIME
Refills: 0 | Status: DISCONTINUED | OUTPATIENT
Start: 2025-01-12 | End: 2025-01-13

## 2025-01-12 RX ORDER — GINKGO BILOBA 40 MG
3 CAPSULE ORAL AT BEDTIME
Refills: 0 | Status: DISCONTINUED | OUTPATIENT
Start: 2025-01-12 | End: 2025-01-13

## 2025-01-12 RX ORDER — NEBIVOLOL 5 MG/1
10 TABLET ORAL DAILY
Refills: 0 | Status: DISCONTINUED | OUTPATIENT
Start: 2025-01-12 | End: 2025-01-13

## 2025-01-12 RX ORDER — CEFTRIAXONE SODIUM 1 G/1
1000 INJECTION, POWDER, FOR SOLUTION INTRAMUSCULAR; INTRAVENOUS EVERY 24 HOURS
Refills: 0 | Status: DISCONTINUED | OUTPATIENT
Start: 2025-01-12 | End: 2025-01-13

## 2025-01-12 RX ADMIN — Medication 81 MILLIGRAM(S): at 09:25

## 2025-01-12 RX ADMIN — RIVAROXABAN 15 MILLIGRAM(S): 2.5 TABLET, FILM COATED ORAL at 16:25

## 2025-01-12 RX ADMIN — Medication 110 MILLIGRAM(S): at 09:27

## 2025-01-12 RX ADMIN — ATORVASTATIN CALCIUM 10 MILLIGRAM(S): 40 TABLET, FILM COATED ORAL at 21:16

## 2025-01-12 RX ADMIN — CEFTRIAXONE SODIUM 1000 MILLIGRAM(S): 1 INJECTION, POWDER, FOR SOLUTION INTRAMUSCULAR; INTRAVENOUS at 09:27

## 2025-01-12 RX ADMIN — PIPERACILLIN AND TAZOBACTAM 200 GRAM(S): 3; .375 INJECTION, POWDER, LYOPHILIZED, FOR SOLUTION INTRAVENOUS at 01:51

## 2025-01-12 RX ADMIN — Medication 110 MILLIGRAM(S): at 21:16

## 2025-01-12 RX ADMIN — Medication 200 MILLIGRAM(S): at 22:50

## 2025-01-12 RX ADMIN — Medication 125 MICROGRAM(S): at 09:23

## 2025-01-12 RX ADMIN — Medication 25 MILLIGRAM(S): at 09:24

## 2025-01-12 RX ADMIN — PANTOPRAZOLE 40 MILLIGRAM(S): 40 TABLET, DELAYED RELEASE ORAL at 09:23

## 2025-01-12 NOTE — CHART NOTE - NSCHARTNOTEFT_GEN_A_CORE
Patient seen and examined at bedside -  at bedside with all patients [facility] clinical records    Patient from a respiratory standpoint much improved. Being tx for aspiration PNA - seen by S+S while here  No wheezing on exam - will continue with ctx and doxy. has albuterol prn   no indication for steroids at this time - not in copd exacerbation  euvolemic from hfpef standpoint    Of note patient with elevated lfts and bili on admission that have now downtrended   with [facility] records at bedside. on 1/5/24 LFTS and bilirubin WNL.  Was being w/u for portal hypertension --> ultrasuond done july 2024 with no cbd dilation at the time but portal hypertension present. also had hemangioma then  Had MR of liver this month with non cirrhotic liver with patent portal and hepatic veins and dilated portal and splenic veins. Few hepatic hemangiomas and no suspicious liver lesions. Markedly enlarged spleen (known on previous imaging)   EGD last month at [facility]:   - No endoscopic evidence of varices in the esophagus                       - No gross lesions in the stomach. Biopsied.                       - No gross lesions in the first portion of the duodenum and in the second                        portion of the duodenum.   Discussed with GI will obtain MRCP and f/u.    See H&P for rest of assessment and plan

## 2025-01-12 NOTE — H&P ADULT - NSHPREVIEWOFSYSTEMS_GEN_ALL_CORE
CONSTITUTIONAL: Positive for fever. No weakness or chills  EYES/ENT: No visual changes; eye pain; eye discharge  NECK: No pain or stiffness  RESPIRATORY: Positive for cough and wheezing. No hemoptysis; No shortness of breath  CARDIOVASCULAR: No chest pain or palpitations   GASTROINTESTINAL: No abdominal or epigastric pain. No nausea, vomiting; No diarrhea or constipation.   GENITOURINARY: No dysuria, frequency or hematuria  MSK: No joint pain or swelling  NEUROLOGICAL: No dizziness, lightheadedness, numbness or weakness  SKIN: No itching, rashes  PSYCH: No anxiety or depression

## 2025-01-12 NOTE — SWALLOW BEDSIDE ASSESSMENT ADULT - SWALLOW EVAL: RECOMMENDED DIET
maintain regular texture; Disc with Pt re: strategies to maximize safety of swallow given underlying COPD, and present acute onset of cough and hypoxia.

## 2025-01-12 NOTE — H&P ADULT - HISTORY OF PRESENT ILLNESS
This patient is a 74 year old female with PMH of HTN, COPD, myelofibrosis, HFpEF, HLD, and paroxysmal atrial fibrillation who presented to the ER from Urgent care due to cough and hypoxia. The patient reports first going to urgent care on Monday due to cough, and at that time she was not given any medication and was told to return in a few days if her symptoms persisted. She continued to have productive cough with clear sputum therefore she went back to urgent care today, was found to be hypoxic, and was referred to the ER. The patient also reports fever and wheezing but denies lightheadedness, chest pain, palpitations, orthopnea, and lower extremity edema. She feels improved s/p antibiotics, oxygen, nebulizer, and steroids.

## 2025-01-12 NOTE — PATIENT PROFILE ADULT - FALL HARM RISK - HARM RISK INTERVENTIONS

## 2025-01-12 NOTE — SWALLOW BEDSIDE ASSESSMENT ADULT - SWALLOW EVAL: DIAGNOSIS
pt shows no overt oral-pharyngeal contraindication for regular texture diet.  Silent and micro-aspiration cannot be ruled out. Altering the texture of the diet will not make a difference except to reduce the quality of life.

## 2025-01-12 NOTE — PATIENT PROFILE ADULT - FUNCTIONAL ASSESSMENT - DAILY ACTIVITY SCORE.
MEDICARE WELLNESS VISIT NOTE      HISTORY OF PRESENT ILLNESS:   Sandrine Wilson presents for her Subsequent Annual Medicare Wellness Visit.   She has no current complaints or concerns.      Patient Care Team:  Booker Aguilar MD as PCP - General        Patient Active Problem List    Diagnosis Date Noted   • Breast cancer (CMS/Formerly [facility])      Priority: Low     froedtert     • History of colonic polyps 2012     Priority: Low     dr machado  scope adenomatous polyp due      • Essential hypertension 2008     Priority: Low   • Generalized anxiety disorder 2008     Priority: Low   • Unspecified sleep apnea 2008     Priority: Low         Past Medical History:   Diagnosis Date   • After-cataract, unspecified     OU   • Breast cancer (CMS/[facility])     froedtert   • Essential hypertension, benign    • Generalized anxiety disorder    • History of colonic polyps 2012    dr machado  scope adenomatous polyp due     • Other and unspecified hyperlipidemia    • Unspecified essential hypertension 2008   • Unspecified sleep apnea 2008   dr aakash jaffe         Past Surgical History:   Procedure Laterality Date   • Dexa bone density axial skeleton  2016    socorrotert   • Open access colonoscopy  2007     tubular adenoma and removed   • Removal gallbladder      Cholecystectomy         Social History   Substance Use Topics   • Smoking status: Never Smoker   • Smokeless tobacco: Never Used   • Alcohol use Yes      Comment:  maybe 3 glasses of wine per week     Drug use:    Drug Use:    No              Family Status   Relation Status   • Mother    • Father    • Child Alive   • Child Alive   • Sister Alive   • Maternal Grandmother    • Maternal Aunt    • Paternal Aunt        Current Outpatient Prescriptions   Medication Sig Dispense Refill   • diclofenac (VOLTAREN) 1 % gel Apply to the ankles and knees as needed to relieve pain 300 g 5   • losartan  (COZAAR) 100 MG tablet Take 1 tablet by mouth daily. 90 tablet 3   • cloNIDine (CATAPRES) 0.1 MG tablet Take 1 tablet by mouth 2 times daily. 180 tablet 2   • anastrozole (ARIMIDEX) 1 MG tablet Take 1 tablet by mouth daily. 90 tablet 3   • Multiple Vitamins-Minerals (PRESERVISION AREDS) capsule Take 1 capsule by mouth 2 times daily.     • Probiotic Product (PRO-BIOTIC BLEND PO) Take  by mouth.     • Multiple Vitamins-Minerals (CENTRUM PO) Take 1 tablet by mouth daily.     • CALCIUM + D 315-200 MG-UNIT PO TABS 1 pill bid 30 0     No current facility-administered medications for this visit.         Medicare Health Risk Assessment in electronic health record reviewed. The following items were identified and addressed:    No risks were identified  Vision and hearing screens documented:  Up to date  Advance Directive: Patient has an Advance Directives document, which is on file  Cognitive Assessment: no evidence of cognitive dysfunction by direct observation      Needed Screening/Treatment:   Bone density and Immunizations reviewed and patient needs: Herpes Zoster  Needed follow up:  None  Except routine bmp and ua    See orders.   See Patient Instructions section.   Return in about 1 year (around 3/7/2019) for Medicare Wellness Visit.     24

## 2025-01-12 NOTE — H&P ADULT - NSHPPHYSICALEXAM_GEN_ALL_CORE
GEN: Well nourished, no acute distress  HEENT:  Pupils equal and reactive, no oropharyngeal lesions, erythema, exudates, oral thrush  NECK:  Supple, no palpable lymph nodes, no thyromegaly  CV:  Regular rate and rhythm, +S1, +S2, no murmurs or rubs  PULM: Left sided diffuse crackles  GI:  Abdomen soft, non-tender, non-distended,  no palpable masses  MSK:  Normal muscle tone, no atrophy, no rigidity, no contractions  EXT:  No clubbing, no cyanosis, no edema, no  swelling or erythema  VASCULAR:  pulses equal and symmetric in the upper and lower extremities  NEURO:  AAOX3, no focal neurological deficits, follows all commands, able to move extremities spontaneously  SKIN:  no ulcers, lesions or rashes

## 2025-01-12 NOTE — ED ADULT NURSE REASSESSMENT NOTE - NS ED NURSE REASSESS COMMENT FT1
Continues on 2L 02 via NC, 02 sat 95-96% with 02, desat to 88-89% on RA. Resp. even and unlabored, frequent congested cough. Waiting for bed, pt updated on plan of care.

## 2025-01-12 NOTE — SWALLOW BEDSIDE ASSESSMENT ADULT - NS SPL SWALLOW CLINIC TRIAL FT
Pt shows no overt oral-pharyngeal contraindication for regular texture diet.  Maintain regular texture, thin liquids,   meds as tolerated.  use strategies as discussed.    Disc with Nsg  Service will not follow. Please re-consult prn.

## 2025-01-12 NOTE — SWALLOW BEDSIDE ASSESSMENT ADULT - COMMENTS
As per H and P: " 74 year old female with PMH of HTN, COPD, myelofibrosis, HFpEF, HLD, and paroxysmal atrial fibrillation who presented to the ER from Urgent care due to cough and hypoxia. The patient reports first going to urgent care on Monday due to cough, and at that time she was not given any medication and was told to return in a few days if her symptoms persisted. She continued to have productive cough with clear sputum therefore she went back to urgent care today, was found to be hypoxic, and was referred to the ER. The patient also reports fever and wheezing but denies lightheadedness, chest pain, palpitations, orthopnea, and lower extremity edema. She feels improved s/p antibiotics, oxygen, nebulizer, and steroids.   Service is consulted for po intake, and to determine the texture of the diet.  Note hx smoking, COPD.

## 2025-01-12 NOTE — H&P ADULT - ASSESSMENT
This patient is a 74 year old female with PMH of HTN, COPD, myelofibrosis, HFpEF, HLD, and paroxysmal atrial fibrillation who presented to the ER from Urgent care due to cough and hypoxia. The patient reports first going to urgent care on Monday due to cough, and at that time she was not given any medication and was told to return in a few days if her symptoms persisted. She continued to have productive cough with clear sputum therefore she went back to urgent care today, was found to be hypoxic, and was referred to the ER. The patient also reports fever and wheezing but denies lightheadedness, chest pain, palpitations, orthopnea, and lower extremity edema. She feels improved s/p antibiotics, oxygen, nebulizer, and steroids.     Labs reviewed   X-rays and CT scans reviewed    #Community acquired pneumonia  #Acute hypoxic respiratory failure  Patient with progressive productive cough, hypoxia, fever, and left sided ground glass opacities   Overall picture more suggestive of pneumonia rather than COPD or HFpEF exacerbation   O2 initially 89% on room air, jade of 87% while in the ER - improved with nasal cannula  Plan  - Start ceftriaxone IV and doxycycline IV  - Continue albuterol PRN  - Follow up blood cultures  - Continue supplemental O2 as needed  - No indication for steroids at this time    #Elevated LFTs  Etiology unclear, patient reportedly underwent outpatient workup   Will check RUQ US    #COPD  The patient's symptoms appear more consistent with pneumonia rather than a COPD exacerbation at this time   Will continue with antibiotics and inhaler as above  No indication for steroids at this time     #HFpEF  Patient with previous history of HFpEF exacerbation   Currently euvolemic and well compensated   No indication for diuresis     #Paroxysmal atrial fibrillation  Currently in sinus rhythm  Start Toprol 25mg daily (home nebivolol not available)  Continue digoxin 125mcg daily   Continue Xarelto 15mg daily     DVT ppx: on Xarelto 15mg daily for atrial fibrillation   GI ppx: Pantoprazole 40mg daily   Diet: Regular   Code status: Full  This patient is a 74 year old female with PMH of HTN, COPD, myelofibrosis, HFpEF, HLD, and paroxysmal atrial fibrillation who presented to the ER from Urgent care due to cough and hypoxia. The patient reports first going to urgent care on Monday due to cough, and at that time she was not given any medication and was told to return in a few days if her symptoms persisted. She continued to have productive cough with clear sputum therefore she went back to urgent care today, was found to be hypoxic, and was referred to the ER. The patient also reports fever and wheezing but denies lightheadedness, chest pain, palpitations, orthopnea, and lower extremity edema. She feels improved s/p antibiotics, oxygen, nebulizer, and steroids.     Labs reviewed   X-rays and CT scans reviewed    #Community acquired pneumonia  #Acute hypoxic respiratory failure  Patient with progressive productive cough, hypoxia, fever, and left sided ground glass opacities   Overall picture more suggestive of pneumonia rather than COPD or HFpEF exacerbation   O2 initially 89% on room air, jade of 87% while in the ER - improved with nasal cannula  Plan  - Start ceftriaxone IV and doxycycline IV  - Continue albuterol PRN  - Follow up blood cultures  - Continue supplemental O2 as needed  - No indication for steroids at this time    #Elevated LFTs  Etiology unclear, patient reportedly underwent outpatient workup   Will check RUQ US    #COPD  The patient's symptoms appear more consistent with pneumonia rather than a COPD exacerbation at this time   Will continue with antibiotics and inhaler as above  No indication for steroids at this time     #HFpEF (EF 60%)  Patient with previous history of HFpEF exacerbation requiring IV diuresis  Currently euvolemic and well compensated   No indication for diuresis     #Paroxysmal atrial fibrillation  Currently in sinus rhythm  Start Toprol 25mg daily (home nebivolol not available)  Continue digoxin 125mcg daily   Continue Xarelto 15mg daily     DVT ppx: on Xarelto 15mg daily for atrial fibrillation   GI ppx: Pantoprazole 40mg daily   Diet: Regular   Code status: Full

## 2025-01-13 ENCOUNTER — TRANSCRIPTION ENCOUNTER (OUTPATIENT)
Age: 75
End: 2025-01-13

## 2025-01-13 VITALS
OXYGEN SATURATION: 93 % | HEART RATE: 79 BPM | DIASTOLIC BLOOD PRESSURE: 66 MMHG | SYSTOLIC BLOOD PRESSURE: 131 MMHG | TEMPERATURE: 98 F

## 2025-01-13 LAB
ALBUMIN SERPL ELPH-MCNC: 3.6 G/DL — SIGNIFICANT CHANGE UP (ref 3.3–5)
ALP SERPL-CCNC: 223 U/L — HIGH (ref 40–120)
ALT FLD-CCNC: 144 U/L — HIGH (ref 12–78)
ANION GAP SERPL CALC-SCNC: 4 MMOL/L — LOW (ref 5–17)
AST SERPL-CCNC: 52 U/L — HIGH (ref 15–37)
BILIRUB SERPL-MCNC: 0.7 MG/DL — SIGNIFICANT CHANGE UP (ref 0.2–1.2)
BUN SERPL-MCNC: 22 MG/DL — SIGNIFICANT CHANGE UP (ref 7–23)
CALCIUM SERPL-MCNC: 9.4 MG/DL — SIGNIFICANT CHANGE UP (ref 8.5–10.1)
CHLORIDE SERPL-SCNC: 101 MMOL/L — SIGNIFICANT CHANGE UP (ref 96–108)
CO2 SERPL-SCNC: 29 MMOL/L — SIGNIFICANT CHANGE UP (ref 22–31)
CREAT SERPL-MCNC: 0.69 MG/DL — SIGNIFICANT CHANGE UP (ref 0.5–1.3)
EGFR: 91 ML/MIN/1.73M2 — SIGNIFICANT CHANGE UP
GLUCOSE SERPL-MCNC: 94 MG/DL — SIGNIFICANT CHANGE UP (ref 70–99)
HCT VFR BLD CALC: 41.1 % — SIGNIFICANT CHANGE UP (ref 34.5–45)
HGB BLD-MCNC: 12.6 G/DL — SIGNIFICANT CHANGE UP (ref 11.5–15.5)
MCHC RBC-ENTMCNC: 30.5 PG — SIGNIFICANT CHANGE UP (ref 27–34)
MCHC RBC-ENTMCNC: 30.7 G/DL — LOW (ref 32–36)
MCV RBC AUTO: 99.5 FL — SIGNIFICANT CHANGE UP (ref 80–100)
PLATELET # BLD AUTO: 502 K/UL — HIGH (ref 150–400)
POTASSIUM SERPL-MCNC: 4.3 MMOL/L — SIGNIFICANT CHANGE UP (ref 3.5–5.3)
POTASSIUM SERPL-SCNC: 4.3 MMOL/L — SIGNIFICANT CHANGE UP (ref 3.5–5.3)
PROT SERPL-MCNC: 7.7 GM/DL — SIGNIFICANT CHANGE UP (ref 6–8.3)
RBC # BLD: 4.13 M/UL — SIGNIFICANT CHANGE UP (ref 3.8–5.2)
RBC # FLD: 20.6 % — HIGH (ref 10.3–14.5)
SODIUM SERPL-SCNC: 134 MMOL/L — LOW (ref 135–145)
WBC # BLD: 11.5 K/UL — HIGH (ref 3.8–10.5)
WBC # FLD AUTO: 11.5 K/UL — HIGH (ref 3.8–10.5)

## 2025-01-13 PROCEDURE — 99239 HOSP IP/OBS DSCHRG MGMT >30: CPT

## 2025-01-13 PROCEDURE — 74181 MRI ABDOMEN W/O CONTRAST: CPT | Mod: 26

## 2025-01-13 RX ORDER — ALBUTEROL SULFATE 90 UG/1
2 INHALANT RESPIRATORY (INHALATION)
Qty: 1 | Refills: 0
Start: 2025-01-13 | End: 2025-01-22

## 2025-01-13 RX ORDER — DOXYCYCLINE MONOHYDRATE 100 MG
1 TABLET ORAL
Qty: 10 | Refills: 0
Start: 2025-01-13 | End: 2025-01-17

## 2025-01-13 RX ORDER — CEFPODOXIME PROXETIL 100 MG/5ML
1 GRANULE, FOR SUSPENSION ORAL
Qty: 10 | Refills: 0
Start: 2025-01-13 | End: 2025-01-17

## 2025-01-13 RX ADMIN — Medication 110 MILLIGRAM(S): at 09:59

## 2025-01-13 RX ADMIN — Medication 81 MILLIGRAM(S): at 10:00

## 2025-01-13 RX ADMIN — NEBIVOLOL 10 MILLIGRAM(S): 5 TABLET ORAL at 10:00

## 2025-01-13 RX ADMIN — CEFTRIAXONE SODIUM 1000 MILLIGRAM(S): 1 INJECTION, POWDER, FOR SOLUTION INTRAMUSCULAR; INTRAVENOUS at 10:00

## 2025-01-13 RX ADMIN — Medication 125 MICROGRAM(S): at 10:00

## 2025-01-13 RX ADMIN — PANTOPRAZOLE 40 MILLIGRAM(S): 40 TABLET, DELAYED RELEASE ORAL at 05:25

## 2025-01-13 NOTE — DISCHARGE NOTE NURSING/CASE MANAGEMENT/SOCIAL WORK - PATIENT PORTAL LINK FT
You can access the FollowMyHealth Patient Portal offered by [facility] by registering at the following website: [email or web address]. By joining [facility]’s FollowMyHealth portal, you will also be able to view your health information using other applications (apps) compatible with our system.

## 2025-01-13 NOTE — DISCHARGE NOTE PROVIDER - NSDCCPCAREPLAN_GEN_ALL_CORE_FT
PRINCIPAL DISCHARGE DIAGNOSIS  Diagnosis: Pneumonia, aspiration  Assessment and Plan of Treatment: You were diagnosed with pneumonia, an infection in your lungs. You were treated with antibiotics and began to show improvement. Please continue taking your antibiotic. Please follow up with your primary care physician to check for full resolution of this problem.

## 2025-01-13 NOTE — DISCHARGE NOTE PROVIDER - HOSPITAL COURSE
#Discharge: do not delete     74 year old female with PMH of HTN, COPD, myelofibrosis, HFpEF, HLD, and paroxysmal atrial fibrillation who presented to the ER from Urgent care due to cough and hypoxia.  admitted for pneumonia intially hypoxic now stable for discharge. Incidentally found to have dilated CBD --> LFTS & Bili peaked stable for outpatient follow up.     Problem List/Main Diagnoses (system-based):   #Community acquired pneumonia  #Acute hypoxic respiratory failure  Patient with progressive productive cough, hypoxia, fever, and left sided ground glass opacities   Overall picture more suggestive of pneumonia rather than COPD or HFpEF exacerbation   O2 initially 89% on room air, jade of 87% while in the ER - improved with nasal cannula  now on room air ok to go on oral antiobitiocs   -albuterol prn  -cefpodoxime doxy  course  -clear by S+S    #Elevated LFTs & bili    with [facility] records at bedside. on 1/5/24 LFTS and bilirubin WNL.  Was being w/u for portal hypertension --> ultrasuond done july 2024 with no cbd dilation at the time but portal hypertension present. also had hemangioma then  Had MR of liver this month with non cirrhotic liver with patent portal and hepatic veins and dilated portal and splenic veins. Few hepatic hemangiomas and no suspicious liver lesions. Markedly enlarged spleen   Here Bili normalized, LFTs downtrending  patient remained asymptomatic throughout the course  MRCP shows Prominent CBD measuring up to 0.9 cm. No visualized choledocholithiasis. No intrahepatic biliary duct dilatation.  Patient with downtrending LFTs, no obstruction seen, possibly passed a stone vs dilation with age & had elevated labs if septic prior to presentation, patient well plugged in with GI will follow up outpatient discussed with family.     #COPD  The patient's symptoms appear more consistent with pneumonia rather than a COPD exacerbation at this time   Will continue with antibiotics and inhaler as above  No indication for steroids at this time     #HFpEF (EF 60%)  Patient with previous history of HFpEF exacerbation requiring IV diuresis  Currently euvolemic and well compensated   No indication for diuresis     #Paroxysmal atrial fibrillation  Currently in sinus rhythm  Start Toprol 25mg daily (home nebivolol not available)  Continue digoxin 125mcg daily   Continue Xarelto 15mg daily     Inpatient treatment course: as above  New medications: doxy cefpodoxime  Labs to be followed outpatient: CMP

## 2025-01-13 NOTE — DISCHARGE NOTE PROVIDER - CARE PROVIDER_API CALL
Preston Varma Michele  Internal Medicine  [address] [place] [address]  Phone: [phone]  Fax: [phone]  Follow Up Time:

## 2025-01-13 NOTE — DISCHARGE NOTE NURSING/CASE MANAGEMENT/SOCIAL WORK - FINANCIAL ASSISTANCE
[facility] provides services at a reduced cost to those who are determined to be eligible through [facility]’s financial assistance program. Information regarding [facility]’s financial assistance program can be found by going to [email or web address] or by calling [phone].

## 2025-01-13 NOTE — DISCHARGE NOTE PROVIDER - NSDCFUADDAPPT_GEN_ALL_CORE_FT
Dr. Preston Varma  [address] [place] [address] [address]   [place] [address]   [phone]  Tuesday January 21st @ 10:45 am

## 2025-01-13 NOTE — DISCHARGE NOTE NURSING/CASE MANAGEMENT/SOCIAL WORK - NSDCFUADDAPPT_GEN_ALL_CORE_FT
Dr. Preston Varma  [address] [place] [address] [address]   [place] [address]   [phone]  Tuesday January 21st @ 10:45 am

## 2025-01-13 NOTE — DISCHARGE NOTE PROVIDER - NSDCMRMEDTOKEN_GEN_ALL_CORE_FT
albuterol 90 mcg/inh inhalation aerosol: 2 puff(s) inhaled every 6 hours as needed for Shortness of Breath and/or Wheezing  Aspir 81 oral delayed release tablet: 1 tab(s) orally once a day  atorvastatin 10 mg oral tablet: 1 tab(s) orally once a day (at bedtime)  cefpodoxime 200 mg oral tablet: 1 tab(s) orally every 12 hours  digoxin 125 mcg (0.125 mg) oral tablet: 1 tab(s) orally once a day  doxycycline hyclate 100 mg oral tablet: 1 tab(s) orally every 12 hours  febuxostat 40 mg oral tablet: 1 tab(s) orally once a day  Lenalidomide 5 mg oral capsule: 1 cap(s) orally once a day  nebivolol 10 mg oral tablet: 1 tab(s) orally once a day  omeprazole 20 mg oral delayed release capsule: 1 tab(s) orally once a day  rivaroxaban 15 mg oral tablet: 1 tab(s) orally once a day (at bedtime)

## 2025-01-13 NOTE — DISCHARGE NOTE NURSING/CASE MANAGEMENT/SOCIAL WORK - NSDCPEFALRISK_GEN_ALL_CORE
For information on Fall & Injury Prevention, visit: [email or web address] OR  [email or web address] OR  https://www.cdc.gov/steadi/patient.html

## 2025-01-13 NOTE — DISCHARGE NOTE PROVIDER - ATTENDING DISCHARGE PHYSICAL EXAMINATION:
PHYSICAL EXAM:    GENERAL: Comfortable, no acute distress   HEAD:  Normocephalic, atraumatic  EYES: EOMI, PERRLA  HEENT: Moist mucous membranes  NECK: Supple, No JVD  NERVOUS SYSTEM:  Alert & Oriented X3, Motor Strength 5/5 B/L upper and lower extremities  CHEST/LUNG: Clear to auscultation bilaterally  HEART: Regular rate and rhythm  ABDOMEN: Soft, non tender, Nondistended, Bowel sounds present  GENITOURINARY: Voiding, no palpable bladder  EXTREMITIES:   No clubbing, cyanosis, or edema  MUSCULOSKELETAL- No muscle tenderness, no joint tenderness  SKIN-no rash

## 2025-01-21 DIAGNOSIS — I48.0 PAROXYSMAL ATRIAL FIBRILLATION: ICD-10-CM

## 2025-01-21 DIAGNOSIS — J69.0 PNEUMONITIS DUE TO INHALATION OF FOOD AND VOMIT: ICD-10-CM

## 2025-01-21 DIAGNOSIS — I11.0 HYPERTENSIVE HEART DISEASE WITH HEART FAILURE: ICD-10-CM

## 2025-01-21 DIAGNOSIS — R06.02 SHORTNESS OF BREATH: ICD-10-CM

## 2025-01-21 DIAGNOSIS — D18.03 HEMANGIOMA OF INTRA-ABDOMINAL STRUCTURES: ICD-10-CM

## 2025-01-21 DIAGNOSIS — K83.8 OTHER SPECIFIED DISEASES OF BILIARY TRACT: ICD-10-CM

## 2025-01-21 DIAGNOSIS — J44.9 CHRONIC OBSTRUCTIVE PULMONARY DISEASE, UNSPECIFIED: ICD-10-CM

## 2025-01-21 DIAGNOSIS — D75.81 MYELOFIBROSIS: ICD-10-CM

## 2025-01-21 DIAGNOSIS — E78.5 HYPERLIPIDEMIA, UNSPECIFIED: ICD-10-CM

## 2025-01-21 DIAGNOSIS — J96.01 ACUTE RESPIRATORY FAILURE WITH HYPOXIA: ICD-10-CM

## 2025-01-21 DIAGNOSIS — I50.32 CHRONIC DIASTOLIC (CONGESTIVE) HEART FAILURE: ICD-10-CM

## 2025-03-11 NOTE — ED PROVIDER NOTE - NSICDXNOPASTSURGICALHX_GEN_ALL_ED
Please advise, thank you. Should we schedule appointment for weight check.  <-- Click to add NO significant Past Surgical History

## 2025-03-27 ENCOUNTER — APPOINTMENT (OUTPATIENT)
Dept: CARDIOLOGY | Facility: CLINIC | Age: 75
End: 2025-03-27
Payer: MEDICARE

## 2025-03-27 ENCOUNTER — NON-APPOINTMENT (OUTPATIENT)
Age: 75
End: 2025-03-27

## 2025-03-27 VITALS — HEIGHT: 60 IN | HEART RATE: 60 BPM | BODY MASS INDEX: 23.16 KG/M2 | OXYGEN SATURATION: 97 % | WEIGHT: 118 LBS

## 2025-03-27 VITALS — SYSTOLIC BLOOD PRESSURE: 112 MMHG | DIASTOLIC BLOOD PRESSURE: 68 MMHG

## 2025-03-27 DIAGNOSIS — Z87.891 PERSONAL HISTORY OF NICOTINE DEPENDENCE: ICD-10-CM

## 2025-03-27 DIAGNOSIS — E78.5 HYPERLIPIDEMIA, UNSPECIFIED: ICD-10-CM

## 2025-03-27 DIAGNOSIS — Z86.39 PERSONAL HISTORY OF OTHER ENDOCRINE, NUTRITIONAL AND METABOLIC DISEASE: ICD-10-CM

## 2025-03-27 DIAGNOSIS — I48.0 PAROXYSMAL ATRIAL FIBRILLATION: ICD-10-CM

## 2025-03-27 PROCEDURE — 99214 OFFICE O/P EST MOD 30 MIN: CPT | Mod: 25

## 2025-03-27 PROCEDURE — 93000 ELECTROCARDIOGRAM COMPLETE: CPT

## 2025-03-27 RX ORDER — NEBIVOLOL 20 MG/1
TABLET ORAL
Refills: 0 | Status: ACTIVE | COMMUNITY

## 2025-03-27 RX ORDER — ROSUVASTATIN CALCIUM 5 MG/1
TABLET, FILM COATED ORAL
Refills: 0 | Status: ACTIVE | COMMUNITY

## 2025-03-27 RX ORDER — RIVAROXABAN 2.5 MG/1
TABLET, FILM COATED ORAL
Refills: 0 | Status: ACTIVE | COMMUNITY

## 2025-03-27 RX ORDER — OMEPRAZOLE 20 MG/1
TABLET, DELAYED RELEASE ORAL
Refills: 0 | Status: ACTIVE | COMMUNITY

## 2025-03-27 RX ORDER — DIGOXIN 0.06 MG/1
TABLET ORAL
Refills: 0 | Status: ACTIVE | COMMUNITY

## 2025-08-11 DIAGNOSIS — M79.89 OTHER SPECIFIED SOFT TISSUE DISORDERS: ICD-10-CM

## 2025-08-11 RX ORDER — BUMETANIDE 1 MG/1
1 TABLET ORAL
Qty: 90 | Refills: 3 | Status: ACTIVE | COMMUNITY
Start: 2025-08-11 | End: 1900-01-01

## 2025-09-15 ENCOUNTER — APPOINTMENT (OUTPATIENT)
Dept: CARDIOLOGY | Facility: CLINIC | Age: 75
End: 2025-09-15
Payer: MEDICARE

## 2025-09-15 VITALS
HEART RATE: 61 BPM | DIASTOLIC BLOOD PRESSURE: 64 MMHG | SYSTOLIC BLOOD PRESSURE: 108 MMHG | WEIGHT: 120 LBS | OXYGEN SATURATION: 99 % | HEIGHT: 60 IN | BODY MASS INDEX: 23.56 KG/M2

## 2025-09-15 DIAGNOSIS — Z79.01 LONG TERM (CURRENT) USE OF ANTICOAGULANTS: ICD-10-CM

## 2025-09-15 DIAGNOSIS — J44.9 CHRONIC OBSTRUCTIVE PULMONARY DISEASE, UNSPECIFIED: ICD-10-CM

## 2025-09-15 DIAGNOSIS — I50.30 UNSPECIFIED DIASTOLIC (CONGESTIVE) HEART FAILURE: ICD-10-CM

## 2025-09-15 PROCEDURE — 93000 ELECTROCARDIOGRAM COMPLETE: CPT

## 2025-09-15 PROCEDURE — 99214 OFFICE O/P EST MOD 30 MIN: CPT | Mod: 25

## 2025-09-15 RX ORDER — ATORVASTATIN CALCIUM 10 MG/1
10 TABLET, FILM COATED ORAL DAILY
Qty: 90 | Refills: 3 | Status: ACTIVE | COMMUNITY
Start: 2025-09-15 | End: 1900-01-01

## (undated) DEVICE — BIOPSY FORCEP RADIAL JAW 4 STANDARD WITH NEEDLE

## (undated) DEVICE — BALLOON US ENDO

## (undated) DEVICE — FOLEY HOLDER STATLOCK 2 WAY ADULT

## (undated) DEVICE — TUBING IV SET GRAVITY 3Y 100" MACRO

## (undated) DEVICE — SENSOR O2 FINGER ADULT

## (undated) DEVICE — PACK IV START WITH CHG

## (undated) DEVICE — BITE BLOCK ADULT 20 X 27MM (GREEN)

## (undated) DEVICE — SYR ALLIANCE II INFLATION 60ML

## (undated) DEVICE — CATH IV SAFE BC 20G X 1.16" (PINK)

## (undated) DEVICE — TUBING SUCTION CONN 6FT STERILE

## (undated) DEVICE — SUCTION YANKAUER NO CONTROL VENT

## (undated) DEVICE — CATH IV SAFE BC 22G X 1" (BLUE)

## (undated) DEVICE — TUBING SUCTION 20FT

## (undated) DEVICE — SOL INJ NS 0.9% 500ML 2 PORT